# Patient Record
Sex: FEMALE | Race: WHITE | Employment: OTHER | URBAN - METROPOLITAN AREA
[De-identification: names, ages, dates, MRNs, and addresses within clinical notes are randomized per-mention and may not be internally consistent; named-entity substitution may affect disease eponyms.]

---

## 2017-01-03 RX ORDER — ALLOPURINOL 300 MG/1
TABLET ORAL
Qty: 90 TABLET | Refills: 3 | Status: SHIPPED | OUTPATIENT
Start: 2017-01-03 | End: 2018-02-07 | Stop reason: SDUPTHER

## 2017-01-16 ENCOUNTER — OFFICE VISIT (OUTPATIENT)
Dept: INTERNAL MEDICINE CLINIC | Age: 72
End: 2017-01-16

## 2017-01-16 VITALS
OXYGEN SATURATION: 90 % | TEMPERATURE: 97.3 F | WEIGHT: 160 LBS | SYSTOLIC BLOOD PRESSURE: 102 MMHG | HEIGHT: 64 IN | DIASTOLIC BLOOD PRESSURE: 58 MMHG | HEART RATE: 70 BPM | BODY MASS INDEX: 27.31 KG/M2

## 2017-01-16 DIAGNOSIS — K58.0 IRRITABLE BOWEL SYNDROME WITH DIARRHEA: Chronic | ICD-10-CM

## 2017-01-16 DIAGNOSIS — J44.1 COPD EXACERBATION (HCC): Primary | ICD-10-CM

## 2017-01-16 PROCEDURE — 99213 OFFICE O/P EST LOW 20 MIN: CPT | Performed by: INTERNAL MEDICINE

## 2017-01-16 RX ORDER — LEVOFLOXACIN 500 MG/1
500 TABLET, FILM COATED ORAL DAILY
Qty: 10 TABLET | Refills: 0 | Status: SHIPPED | OUTPATIENT
Start: 2017-01-16 | End: 2017-01-26

## 2017-01-16 RX ORDER — HYDROCODONE POLISTIREX AND CHLORPHENIRAMINE POLISTIREX 10; 8 MG/5ML; MG/5ML
5 SUSPENSION, EXTENDED RELEASE ORAL EVERY 12 HOURS PRN
Qty: 120 ML | Refills: 0 | Status: SHIPPED | OUTPATIENT
Start: 2017-01-16 | End: 2017-06-26

## 2017-01-16 ASSESSMENT — ENCOUNTER SYMPTOMS
COUGH: 1
CHOKING: 0
BACK PAIN: 0
EYES NEGATIVE: 1
SINUS PRESSURE: 0
COLOR CHANGE: 0
STRIDOR: 0
APNEA: 0
SHORTNESS OF BREATH: 1

## 2017-03-24 ENCOUNTER — OFFICE VISIT (OUTPATIENT)
Dept: INTERNAL MEDICINE CLINIC | Age: 72
End: 2017-03-24

## 2017-03-24 VITALS
TEMPERATURE: 98 F | WEIGHT: 160.13 LBS | HEART RATE: 77 BPM | SYSTOLIC BLOOD PRESSURE: 110 MMHG | DIASTOLIC BLOOD PRESSURE: 70 MMHG | HEIGHT: 64 IN | BODY MASS INDEX: 27.34 KG/M2 | OXYGEN SATURATION: 93 %

## 2017-03-24 DIAGNOSIS — J06.9 ACUTE URI: Primary | ICD-10-CM

## 2017-03-24 PROCEDURE — 99213 OFFICE O/P EST LOW 20 MIN: CPT | Performed by: INTERNAL MEDICINE

## 2017-03-24 RX ORDER — LEVOFLOXACIN 500 MG/1
500 TABLET, FILM COATED ORAL DAILY
Qty: 10 TABLET | Refills: 0 | Status: SHIPPED | OUTPATIENT
Start: 2017-03-24 | End: 2017-04-03

## 2017-03-24 RX ORDER — BENZONATATE 200 MG/1
200 CAPSULE ORAL 2 TIMES DAILY PRN
Qty: 20 CAPSULE | Refills: 1 | Status: SHIPPED | OUTPATIENT
Start: 2017-03-24 | End: 2017-04-03

## 2017-03-24 ASSESSMENT — ENCOUNTER SYMPTOMS
WHEEZING: 0
SHORTNESS OF BREATH: 0
COUGH: 1
GASTROINTESTINAL NEGATIVE: 1
SORE THROAT: 1
RHINORRHEA: 1

## 2017-03-30 ENCOUNTER — HOSPITAL ENCOUNTER (OUTPATIENT)
Dept: NON INVASIVE DIAGNOSTICS | Age: 72
Discharge: HOME | End: 2017-03-30
Attending: INTERNAL MEDICINE | Admitting: INTERNAL MEDICINE

## 2017-03-30 ENCOUNTER — OFFICE VISIT (OUTPATIENT)
Dept: INTERNAL MEDICINE CLINIC | Age: 72
End: 2017-03-30

## 2017-03-30 VITALS
HEART RATE: 72 BPM | WEIGHT: 158.8 LBS | RESPIRATION RATE: 16 BRPM | SYSTOLIC BLOOD PRESSURE: 86 MMHG | TEMPERATURE: 98.1 F | OXYGEN SATURATION: 92 % | BODY MASS INDEX: 27.26 KG/M2 | DIASTOLIC BLOOD PRESSURE: 42 MMHG

## 2017-03-30 DIAGNOSIS — J40 BRONCHITIS: Primary | ICD-10-CM

## 2017-03-30 DIAGNOSIS — I95.2 HYPOTENSION DUE TO DRUGS: ICD-10-CM

## 2017-03-30 DIAGNOSIS — J40 BRONCHITIS: ICD-10-CM

## 2017-03-30 PROCEDURE — 99213 OFFICE O/P EST LOW 20 MIN: CPT | Performed by: INTERNAL MEDICINE

## 2017-03-30 RX ORDER — PREDNISONE 10 MG/1
TABLET ORAL
Qty: 22 TABLET | Refills: 0 | Status: SHIPPED | OUTPATIENT
Start: 2017-03-30 | End: 2017-04-09

## 2017-03-30 RX ORDER — LOSARTAN POTASSIUM 50 MG/1
25 TABLET ORAL DAILY
Qty: 30 TABLET | Refills: 0
Start: 2017-03-30

## 2017-03-31 ENCOUNTER — TELEPHONE (OUTPATIENT)
Dept: INTERNAL MEDICINE CLINIC | Age: 72
End: 2017-03-31

## 2017-03-31 DIAGNOSIS — R91.8 INFILTRATE OF LUNG PRESENT ON IMAGING OF CHEST: Primary | ICD-10-CM

## 2017-04-02 ASSESSMENT — ENCOUNTER SYMPTOMS
COUGH: 1
CONSTIPATION: 0
SORE THROAT: 1
DIARRHEA: 0
SHORTNESS OF BREATH: 1
RHINORRHEA: 1
ABDOMINAL PAIN: 0
VOMITING: 0
NAUSEA: 1

## 2017-04-12 ENCOUNTER — TELEPHONE (OUTPATIENT)
Dept: INTERNAL MEDICINE CLINIC | Age: 72
End: 2017-04-12

## 2017-04-17 ENCOUNTER — HOSPITAL ENCOUNTER (OUTPATIENT)
Dept: CT IMAGING | Age: 72
Discharge: HOME | End: 2017-04-17
Attending: INTERNAL MEDICINE | Admitting: INTERNAL MEDICINE

## 2017-04-17 DIAGNOSIS — R91.8 OTHER NONSPECIFIC ABNORMAL FINDING OF LUNG FIELD: ICD-10-CM

## 2017-04-17 DIAGNOSIS — R91.8 INFILTRATE OF LUNG PRESENT ON IMAGING OF CHEST: ICD-10-CM

## 2017-06-26 ENCOUNTER — OFFICE VISIT (OUTPATIENT)
Dept: INTERNAL MEDICINE CLINIC | Age: 72
End: 2017-06-26

## 2017-06-26 VITALS
SYSTOLIC BLOOD PRESSURE: 118 MMHG | BODY MASS INDEX: 27.53 KG/M2 | HEART RATE: 76 BPM | DIASTOLIC BLOOD PRESSURE: 80 MMHG | WEIGHT: 160.38 LBS | TEMPERATURE: 97.9 F

## 2017-06-26 DIAGNOSIS — R93.89 ABNORMAL CT SCAN, CHEST: ICD-10-CM

## 2017-06-26 DIAGNOSIS — L40.9 PSORIASIS: ICD-10-CM

## 2017-06-26 DIAGNOSIS — J01.00 SUBACUTE MAXILLARY SINUSITIS: Primary | ICD-10-CM

## 2017-06-26 DIAGNOSIS — J43.8 OTHER EMPHYSEMA (HCC): ICD-10-CM

## 2017-06-26 PROCEDURE — 99214 OFFICE O/P EST MOD 30 MIN: CPT | Performed by: INTERNAL MEDICINE

## 2017-06-26 RX ORDER — LEVOFLOXACIN 500 MG/1
500 TABLET, FILM COATED ORAL DAILY
Qty: 10 TABLET | Refills: 0 | Status: SHIPPED | OUTPATIENT
Start: 2017-06-26 | End: 2017-07-06

## 2017-06-26 RX ORDER — CLOBETASOL PROPIONATE 0.5 MG/G
CREAM TOPICAL 2 TIMES DAILY
Qty: 60 G | Refills: 1 | Status: SHIPPED | OUTPATIENT
Start: 2017-06-26 | End: 2022-01-19 | Stop reason: ALTCHOICE

## 2017-06-26 ASSESSMENT — ENCOUNTER SYMPTOMS
SORE THROAT: 1
HOARSE VOICE: 1
GASTROINTESTINAL NEGATIVE: 1
ROS SKIN COMMENTS: PSORIASIS.
SINUS PRESSURE: 1
WHEEZING: 0
COUGH: 1
SHORTNESS OF BREATH: 0

## 2017-07-17 ENCOUNTER — HOSPITAL ENCOUNTER (OUTPATIENT)
Dept: CT IMAGING | Age: 72
Discharge: HOME | End: 2017-07-17
Attending: INTERNAL MEDICINE | Admitting: INTERNAL MEDICINE

## 2017-07-17 DIAGNOSIS — R93.89 ABNORMAL CT SCAN, CHEST: ICD-10-CM

## 2017-07-17 DIAGNOSIS — R93.89 ABNORMAL FINDINGS ON DIAGNOSTIC IMAGING OF OTHER SPECIFIED BODY STRUCTURES: ICD-10-CM

## 2017-09-26 ENCOUNTER — TELEPHONE (OUTPATIENT)
Dept: INTERNAL MEDICINE CLINIC | Age: 72
End: 2017-09-26

## 2018-02-08 RX ORDER — ALLOPURINOL 300 MG/1
TABLET ORAL
Qty: 90 TABLET | Refills: 3 | Status: SHIPPED | OUTPATIENT
Start: 2018-02-08 | End: 2019-02-28 | Stop reason: SDUPTHER

## 2018-02-12 ENCOUNTER — OFFICE VISIT (OUTPATIENT)
Dept: INTERNAL MEDICINE CLINIC | Age: 73
End: 2018-02-12

## 2018-02-12 VITALS
HEART RATE: 70 BPM | DIASTOLIC BLOOD PRESSURE: 66 MMHG | WEIGHT: 156 LBS | OXYGEN SATURATION: 94 % | TEMPERATURE: 97.9 F | BODY MASS INDEX: 26.78 KG/M2 | SYSTOLIC BLOOD PRESSURE: 130 MMHG

## 2018-02-12 DIAGNOSIS — J41.0 SIMPLE CHRONIC BRONCHITIS (HCC): ICD-10-CM

## 2018-02-12 DIAGNOSIS — J06.9 ACUTE URI: Primary | ICD-10-CM

## 2018-02-12 DIAGNOSIS — K58.0 IRRITABLE BOWEL SYNDROME WITH DIARRHEA: Chronic | ICD-10-CM

## 2018-02-12 DIAGNOSIS — R68.89 FLU-LIKE SYMPTOMS: ICD-10-CM

## 2018-02-12 LAB
INFLUENZA A ANTIBODY: NEGATIVE
INFLUENZA B ANTIBODY: NEGATIVE

## 2018-02-12 PROCEDURE — 1036F TOBACCO NON-USER: CPT | Performed by: INTERNAL MEDICINE

## 2018-02-12 PROCEDURE — G8427 DOCREV CUR MEDS BY ELIG CLIN: HCPCS | Performed by: INTERNAL MEDICINE

## 2018-02-12 PROCEDURE — 3017F COLORECTAL CA SCREEN DOC REV: CPT | Performed by: INTERNAL MEDICINE

## 2018-02-12 PROCEDURE — G8598 ASA/ANTIPLAT THER USED: HCPCS | Performed by: INTERNAL MEDICINE

## 2018-02-12 PROCEDURE — 3014F SCREEN MAMMO DOC REV: CPT | Performed by: INTERNAL MEDICINE

## 2018-02-12 PROCEDURE — 1123F ACP DISCUSS/DSCN MKR DOCD: CPT | Performed by: INTERNAL MEDICINE

## 2018-02-12 PROCEDURE — 4040F PNEUMOC VAC/ADMIN/RCVD: CPT | Performed by: INTERNAL MEDICINE

## 2018-02-12 PROCEDURE — G8484 FLU IMMUNIZE NO ADMIN: HCPCS | Performed by: INTERNAL MEDICINE

## 2018-02-12 PROCEDURE — G8926 SPIRO NO PERF OR DOC: HCPCS | Performed by: INTERNAL MEDICINE

## 2018-02-12 PROCEDURE — 99214 OFFICE O/P EST MOD 30 MIN: CPT | Performed by: INTERNAL MEDICINE

## 2018-02-12 PROCEDURE — 1090F PRES/ABSN URINE INCON ASSESS: CPT | Performed by: INTERNAL MEDICINE

## 2018-02-12 PROCEDURE — 87804 INFLUENZA ASSAY W/OPTIC: CPT | Performed by: INTERNAL MEDICINE

## 2018-02-12 PROCEDURE — 3023F SPIROM DOC REV: CPT | Performed by: INTERNAL MEDICINE

## 2018-02-12 PROCEDURE — G8399 PT W/DXA RESULTS DOCUMENT: HCPCS | Performed by: INTERNAL MEDICINE

## 2018-02-12 PROCEDURE — G8419 CALC BMI OUT NRM PARAM NOF/U: HCPCS | Performed by: INTERNAL MEDICINE

## 2018-02-12 RX ORDER — AZITHROMYCIN 250 MG/1
TABLET, FILM COATED ORAL
Qty: 6 TABLET | Refills: 0 | Status: SHIPPED | OUTPATIENT
Start: 2018-02-12 | End: 2018-02-20

## 2018-02-12 ASSESSMENT — PATIENT HEALTH QUESTIONNAIRE - PHQ9
SUM OF ALL RESPONSES TO PHQ QUESTIONS 1-9: 0
SUM OF ALL RESPONSES TO PHQ9 QUESTIONS 1 & 2: 0
2. FEELING DOWN, DEPRESSED OR HOPELESS: 0
1. LITTLE INTEREST OR PLEASURE IN DOING THINGS: 0

## 2018-02-12 ASSESSMENT — ENCOUNTER SYMPTOMS
SHORTNESS OF BREATH: 0
GASTROINTESTINAL NEGATIVE: 1
WHEEZING: 0
COUGH: 1
SORE THROAT: 1

## 2018-02-12 NOTE — PATIENT INSTRUCTIONS
Please call your pharmacy if you need any refills of your medication(s). Please call our office at [phone] if you don't hear from us about your test results. Bring an accurate list of your medications with you at every appointment to ensure that we have the correct information.     Our office hours are: Monday - Friday 8:30 am- 5 pm    Phone lines turn on at 8:30 am

## 2018-02-20 ENCOUNTER — OFFICE VISIT (OUTPATIENT)
Dept: INTERNAL MEDICINE CLINIC | Age: 73
End: 2018-02-20

## 2018-02-20 VITALS
WEIGHT: 154.25 LBS | SYSTOLIC BLOOD PRESSURE: 120 MMHG | BODY MASS INDEX: 26.48 KG/M2 | DIASTOLIC BLOOD PRESSURE: 60 MMHG | OXYGEN SATURATION: 91 % | HEART RATE: 83 BPM | TEMPERATURE: 98.5 F

## 2018-02-20 DIAGNOSIS — E78.00 PURE HYPERCHOLESTEROLEMIA: Chronic | ICD-10-CM

## 2018-02-20 DIAGNOSIS — J20.9 COPD (CHRONIC OBSTRUCTIVE PULMONARY DISEASE) WITH ACUTE BRONCHITIS (HCC): Primary | ICD-10-CM

## 2018-02-20 DIAGNOSIS — J44.0 COPD (CHRONIC OBSTRUCTIVE PULMONARY DISEASE) WITH ACUTE BRONCHITIS (HCC): Primary | ICD-10-CM

## 2018-02-20 DIAGNOSIS — R68.89 FLU-LIKE SYMPTOMS: ICD-10-CM

## 2018-02-20 DIAGNOSIS — K58.0 IRRITABLE BOWEL SYNDROME WITH DIARRHEA: ICD-10-CM

## 2018-02-20 LAB
INFLUENZA A ANTIBODY: NORMAL
INFLUENZA B ANTIBODY: NORMAL

## 2018-02-20 PROCEDURE — 3014F SCREEN MAMMO DOC REV: CPT | Performed by: INTERNAL MEDICINE

## 2018-02-20 PROCEDURE — 3023F SPIROM DOC REV: CPT | Performed by: INTERNAL MEDICINE

## 2018-02-20 PROCEDURE — G8926 SPIRO NO PERF OR DOC: HCPCS | Performed by: INTERNAL MEDICINE

## 2018-02-20 PROCEDURE — 87804 INFLUENZA ASSAY W/OPTIC: CPT | Performed by: INTERNAL MEDICINE

## 2018-02-20 PROCEDURE — 4040F PNEUMOC VAC/ADMIN/RCVD: CPT | Performed by: INTERNAL MEDICINE

## 2018-02-20 PROCEDURE — G8598 ASA/ANTIPLAT THER USED: HCPCS | Performed by: INTERNAL MEDICINE

## 2018-02-20 PROCEDURE — G8419 CALC BMI OUT NRM PARAM NOF/U: HCPCS | Performed by: INTERNAL MEDICINE

## 2018-02-20 PROCEDURE — G8484 FLU IMMUNIZE NO ADMIN: HCPCS | Performed by: INTERNAL MEDICINE

## 2018-02-20 PROCEDURE — 1123F ACP DISCUSS/DSCN MKR DOCD: CPT | Performed by: INTERNAL MEDICINE

## 2018-02-20 PROCEDURE — 99213 OFFICE O/P EST LOW 20 MIN: CPT | Performed by: INTERNAL MEDICINE

## 2018-02-20 PROCEDURE — G8399 PT W/DXA RESULTS DOCUMENT: HCPCS | Performed by: INTERNAL MEDICINE

## 2018-02-20 PROCEDURE — 3017F COLORECTAL CA SCREEN DOC REV: CPT | Performed by: INTERNAL MEDICINE

## 2018-02-20 PROCEDURE — 1036F TOBACCO NON-USER: CPT | Performed by: INTERNAL MEDICINE

## 2018-02-20 PROCEDURE — G8427 DOCREV CUR MEDS BY ELIG CLIN: HCPCS | Performed by: INTERNAL MEDICINE

## 2018-02-20 PROCEDURE — 1090F PRES/ABSN URINE INCON ASSESS: CPT | Performed by: INTERNAL MEDICINE

## 2018-02-20 RX ORDER — METHYLPREDNISOLONE 4 MG/1
TABLET ORAL
Qty: 1 KIT | Refills: 0 | Status: SHIPPED | OUTPATIENT
Start: 2018-02-20 | End: 2019-04-05 | Stop reason: SDUPTHER

## 2018-02-20 RX ORDER — LEVOFLOXACIN 500 MG/1
500 TABLET, FILM COATED ORAL DAILY
Qty: 10 TABLET | Refills: 0 | Status: SHIPPED | OUTPATIENT
Start: 2018-02-20 | End: 2019-04-05 | Stop reason: SDUPTHER

## 2018-02-20 ASSESSMENT — ENCOUNTER SYMPTOMS
COUGH: 1
WHEEZING: 1
NAUSEA: 1
DIARRHEA: 0
SHORTNESS OF BREATH: 1

## 2018-03-13 DIAGNOSIS — J45.901 ACUTE BRONCHITIS WITH ASTHMA WITH ACUTE EXACERBATION: ICD-10-CM

## 2018-03-13 DIAGNOSIS — J20.9 ACUTE BRONCHITIS WITH ASTHMA WITH ACUTE EXACERBATION: ICD-10-CM

## 2018-08-06 ENCOUNTER — OFFICE VISIT (OUTPATIENT)
Dept: INTERNAL MEDICINE CLINIC | Age: 73
End: 2018-08-06

## 2018-08-06 VITALS
SYSTOLIC BLOOD PRESSURE: 116 MMHG | DIASTOLIC BLOOD PRESSURE: 62 MMHG | WEIGHT: 157 LBS | HEART RATE: 69 BPM | OXYGEN SATURATION: 94 % | TEMPERATURE: 97.9 F | BODY MASS INDEX: 26.95 KG/M2

## 2018-08-06 DIAGNOSIS — J06.9 ACUTE URI: Primary | ICD-10-CM

## 2018-08-06 PROCEDURE — G8427 DOCREV CUR MEDS BY ELIG CLIN: HCPCS | Performed by: INTERNAL MEDICINE

## 2018-08-06 PROCEDURE — G8598 ASA/ANTIPLAT THER USED: HCPCS | Performed by: INTERNAL MEDICINE

## 2018-08-06 PROCEDURE — 3017F COLORECTAL CA SCREEN DOC REV: CPT | Performed by: INTERNAL MEDICINE

## 2018-08-06 PROCEDURE — G8399 PT W/DXA RESULTS DOCUMENT: HCPCS | Performed by: INTERNAL MEDICINE

## 2018-08-06 PROCEDURE — 1090F PRES/ABSN URINE INCON ASSESS: CPT | Performed by: INTERNAL MEDICINE

## 2018-08-06 PROCEDURE — 1101F PT FALLS ASSESS-DOCD LE1/YR: CPT | Performed by: INTERNAL MEDICINE

## 2018-08-06 PROCEDURE — 99213 OFFICE O/P EST LOW 20 MIN: CPT | Performed by: INTERNAL MEDICINE

## 2018-08-06 PROCEDURE — G8419 CALC BMI OUT NRM PARAM NOF/U: HCPCS | Performed by: INTERNAL MEDICINE

## 2018-08-06 PROCEDURE — 1036F TOBACCO NON-USER: CPT | Performed by: INTERNAL MEDICINE

## 2018-08-06 PROCEDURE — 4040F PNEUMOC VAC/ADMIN/RCVD: CPT | Performed by: INTERNAL MEDICINE

## 2018-08-06 PROCEDURE — 1123F ACP DISCUSS/DSCN MKR DOCD: CPT | Performed by: INTERNAL MEDICINE

## 2018-08-06 RX ORDER — HYDROCODONE POLISTIREX AND CHLORPHENIRAMINE POLISTIREX 10; 8 MG/5ML; MG/5ML
5 SUSPENSION, EXTENDED RELEASE ORAL EVERY 12 HOURS PRN
Qty: 120 ML | Refills: 0 | Status: SHIPPED | OUTPATIENT
Start: 2018-08-06 | End: 2018-08-16

## 2018-08-06 RX ORDER — AZITHROMYCIN 250 MG/1
TABLET, FILM COATED ORAL
Qty: 6 TABLET | Refills: 0 | Status: SHIPPED | OUTPATIENT
Start: 2018-08-06 | End: 2018-08-16

## 2018-08-06 ASSESSMENT — ENCOUNTER SYMPTOMS
SHORTNESS OF BREATH: 1
COUGH: 1
SORE THROAT: 1
VOMITING: 1

## 2018-08-06 NOTE — PROGRESS NOTES
Subjective:      Patient ID: Albina Lutz is a 67 y.o. female. Patient presents with:  Cough: sore throat, cough(yellow), sinus pain and pressure, blowing out bloody mucous. Albina Lutz is a 67 y.o. female with the following history as recorded in [facility]:  Patient Active Problem List    Bunion, right foot         Date Noted: 12/12/2013      Hammertoe         Date Noted: 12/12/2013      HTN (hypertension)         Date Noted: 12/12/2013      Hyperlipidemia         Date Noted: 12/12/2013      IBS (irritable bowel syndrome)         Date Noted: 06/07/2010      CAD (coronary artery disease)         Date Noted: 06/07/2010      Gout, arthritis         Date Noted: 06/07/2010      IBS (irritable bowel syndrome)         Date Noted: 06/07/2010      CAD (coronary artery disease)         Date Noted: 06/07/2010      Current Outpatient Prescriptions:  VENTOLIN  (90 Base) MCG/ACT inhaler, INHALE 2 PUFFS INTO THE LUNGS EVERY 6 HOURS AS NEEDED FOR WHEEZING, Disp: 18 Inhaler, Rfl: 1  allopurinol (ZYLOPRIM) 300 MG tablet, TAKE 1 TABLET EVERY DAY, Disp: 90 tablet, Rfl: 3  clobetasol (TEMOVATE) 0.05 % cream, Apply topically 2 times daily, Disp: 60 g, Rfl: 1  losartan (COZAAR) 50 MG tablet, Take 0.5 tablets by mouth daily, Disp: 30 tablet, Rfl: 0  prochlorperazine (COMPAZINE) 10 MG tablet, Take 1 tablet by mouth every 6 hours as needed, Disp: 50 tablet, Rfl: 3  medroxyPROGESTERone (PROVERA) 2.5 MG tablet, , Disp: , Rfl:   estradiol (ESTRACE) 0.5 MG tablet, Take 0.5 mg by mouth daily. , Disp: , Rfl:   atorvastatin (LIPITOR) 40 MG tablet, , Disp: , Rfl:   fluticasone (FLONASE) 50 MCG/ACT nasal spray, 2 sprays by Nasal route daily. ., Disp: 1 Bottle, Rfl: 3  Loratadine (CLARITIN PO), Take 10 mg by mouth daily. , Disp: , Rfl:   atenolol (TENORMIN) 25 MG tablet, Take 25 mg by mouth daily. , Disp: , Rfl:   aspirin 81 MG EC tablet, Take 81 mg by mouth daily. , Disp: , Rfl:     No current facility-administered medications for this visit. Constitutional: She is oriented to person, place, and time. She appears well-developed and well-nourished. HENT:   Head: Normocephalic and atraumatic. Right Ear: External ear normal.   Left Ear: External ear normal.   Mouth/Throat: No oropharyngeal exudate. Eyes: Conjunctivae and EOM are normal. Pupils are equal, round, and reactive to light. No scleral icterus. Neck: Normal range of motion. Neck supple. No thyromegaly present. Cardiovascular: Normal rate, regular rhythm and normal heart sounds. No murmur heard. Pulmonary/Chest: Effort normal. She has decreased breath sounds. She has no wheezes. She exhibits no tenderness. Abdominal: Soft. She exhibits no distension and no mass. There is no tenderness. Musculoskeletal: Normal range of motion. She exhibits no edema or tenderness. Lymphadenopathy:     She has no cervical adenopathy. Neurological: She is alert and oriented to person, place, and time. She has normal reflexes. Skin: Skin is warm. No erythema. Psychiatric: She has a normal mood and affect. Thought content normal.       Assessment:      Encounter Diagnosis   Name Primary?  Acute URI Yes           Plan:      William Porter was seen today for cough. Diagnoses and all orders for this visit:    Acute URI  -     azithromycin (ZITHROMAX Z-RODOLFO) 250 MG tablet; As directed. -     hydrocodone-chlorpheniramine ([facility]) 10-8 MG/5ML SUER; Take 5 mLs by mouth every 12 hours as needed (cough) for up to 10 days. Marino Ngo

## 2018-08-06 NOTE — PATIENT INSTRUCTIONS
Please call your pharmacy if you need any refills of your medication(s). Please call our office at [phone] if you don't hear from us about your test results. Bring an accurate list of your medications with you at every appointment to ensure that we have the correct information.     Our office hours are: Monday - Friday 8:30 am- 5 pm    Phone lines turn on at 8:30 am

## 2018-11-26 ENCOUNTER — OFFICE VISIT (OUTPATIENT)
Dept: INTERNAL MEDICINE CLINIC | Age: 73
End: 2018-11-26
Payer: MEDICARE

## 2018-11-26 VITALS
TEMPERATURE: 98.7 F | DIASTOLIC BLOOD PRESSURE: 62 MMHG | WEIGHT: 160 LBS | BODY MASS INDEX: 27.46 KG/M2 | HEART RATE: 108 BPM | SYSTOLIC BLOOD PRESSURE: 118 MMHG | OXYGEN SATURATION: 91 %

## 2018-11-26 DIAGNOSIS — J06.9 ACUTE URI: Primary | ICD-10-CM

## 2018-11-26 DIAGNOSIS — M67.40 GANGLION CYST: ICD-10-CM

## 2018-11-26 PROCEDURE — 1101F PT FALLS ASSESS-DOCD LE1/YR: CPT | Performed by: INTERNAL MEDICINE

## 2018-11-26 PROCEDURE — G8399 PT W/DXA RESULTS DOCUMENT: HCPCS | Performed by: INTERNAL MEDICINE

## 2018-11-26 PROCEDURE — 3017F COLORECTAL CA SCREEN DOC REV: CPT | Performed by: INTERNAL MEDICINE

## 2018-11-26 PROCEDURE — 99214 OFFICE O/P EST MOD 30 MIN: CPT | Performed by: INTERNAL MEDICINE

## 2018-11-26 PROCEDURE — G8419 CALC BMI OUT NRM PARAM NOF/U: HCPCS | Performed by: INTERNAL MEDICINE

## 2018-11-26 PROCEDURE — G8484 FLU IMMUNIZE NO ADMIN: HCPCS | Performed by: INTERNAL MEDICINE

## 2018-11-26 PROCEDURE — 1090F PRES/ABSN URINE INCON ASSESS: CPT | Performed by: INTERNAL MEDICINE

## 2018-11-26 PROCEDURE — 1036F TOBACCO NON-USER: CPT | Performed by: INTERNAL MEDICINE

## 2018-11-26 PROCEDURE — G8598 ASA/ANTIPLAT THER USED: HCPCS | Performed by: INTERNAL MEDICINE

## 2018-11-26 PROCEDURE — 4040F PNEUMOC VAC/ADMIN/RCVD: CPT | Performed by: INTERNAL MEDICINE

## 2018-11-26 PROCEDURE — 1123F ACP DISCUSS/DSCN MKR DOCD: CPT | Performed by: INTERNAL MEDICINE

## 2018-11-26 PROCEDURE — G8427 DOCREV CUR MEDS BY ELIG CLIN: HCPCS | Performed by: INTERNAL MEDICINE

## 2018-11-26 RX ORDER — AZITHROMYCIN 250 MG/1
TABLET, FILM COATED ORAL
Qty: 6 TABLET | Refills: 0 | Status: SHIPPED | OUTPATIENT
Start: 2018-11-26 | End: 2018-12-06

## 2018-11-26 ASSESSMENT — ENCOUNTER SYMPTOMS: COUGH: 1

## 2018-11-26 NOTE — PROGRESS NOTES
Subjective:      Patient ID: Beto Mahoney is a 75 y.o. female. Patient presents with:  Fever: fever, headache  Foot Problem: blister on left big toe, about 3 days. Beto Mahoney is a 75 y.o. female with the following history as recorded in [facility]:  Patient Active Problem List    Bunion, right foot         Date Noted: 12/12/2013      Hammertoe         Date Noted: 12/12/2013      HTN (hypertension)         Date Noted: 12/12/2013      Hyperlipidemia         Date Noted: 12/12/2013      IBS (irritable bowel syndrome)         Date Noted: 06/07/2010      CAD (coronary artery disease)         Date Noted: 06/07/2010      Gout, arthritis         Date Noted: 06/07/2010      IBS (irritable bowel syndrome)         Date Noted: 06/07/2010      CAD (coronary artery disease)         Date Noted: 06/07/2010      Current Outpatient Prescriptions:  azithromycin (ZITHROMAX Z-RODOLFO) 250 MG tablet, As directed., Disp: 6 tablet, Rfl: 0  VENTOLIN  (90 Base) MCG/ACT inhaler, INHALE 2 PUFFS INTO THE LUNGS EVERY 6 HOURS AS NEEDED FOR WHEEZING, Disp: 18 Inhaler, Rfl: 1  allopurinol (ZYLOPRIM) 300 MG tablet, TAKE 1 TABLET EVERY DAY, Disp: 90 tablet, Rfl: 3  clobetasol (TEMOVATE) 0.05 % cream, Apply topically 2 times daily, Disp: 60 g, Rfl: 1  losartan (COZAAR) 50 MG tablet, Take 0.5 tablets by mouth daily, Disp: 30 tablet, Rfl: 0  prochlorperazine (COMPAZINE) 10 MG tablet, Take 1 tablet by mouth every 6 hours as needed, Disp: 50 tablet, Rfl: 3  medroxyPROGESTERone (PROVERA) 2.5 MG tablet, , Disp: , Rfl:   estradiol (ESTRACE) 0.5 MG tablet, Take 0.5 mg by mouth daily. , Disp: , Rfl:   atorvastatin (LIPITOR) 40 MG tablet, , Disp: , Rfl:   fluticasone (FLONASE) 50 MCG/ACT nasal spray, 2 sprays by Nasal route daily. ., Disp: 1 Bottle, Rfl: 3  Loratadine (CLARITIN PO), Take 10 mg by mouth daily. , Disp: , Rfl:   atenolol (TENORMIN) 25 MG tablet, Take 25 mg by mouth daily. , Disp: , Rfl:   aspirin 81 MG EC tablet, Take 81 mg by mouth daily. , Disp: provided no relief. Review of Systems   Constitutional: Positive for fever. HENT: Positive for congestion. Respiratory: Positive for cough. Musculoskeletal: Positive for arthralgias. Lt great toe DIP joint area blister like ganglion cyst. No sign of infection and any injury lately. Not much painful. Objective:   Physical Exam   Pulmonary/Chest: She has decreased breath sounds. Skin:            Assessment:      Encounter Diagnoses   Name Primary?  Acute URI Yes    Ganglion cyst            Plan:      Yu Adams was seen today for fever and foot problem. Diagnoses and all orders for this visit:    Acute URI  -     azithromycin (ZITHROMAX Z-RODOLFO) 250 MG tablet; As directed.     Ganglion cyst      Clean wound and poke cyst to remove 1ml amount of serosal fluid   Ngoc Rivera MD

## 2019-02-11 ENCOUNTER — OFFICE VISIT (OUTPATIENT)
Dept: INTERNAL MEDICINE CLINIC | Age: 74
End: 2019-02-11
Payer: MEDICARE

## 2019-02-11 VITALS
BODY MASS INDEX: 26.95 KG/M2 | SYSTOLIC BLOOD PRESSURE: 102 MMHG | OXYGEN SATURATION: 94 % | DIASTOLIC BLOOD PRESSURE: 54 MMHG | WEIGHT: 157 LBS | TEMPERATURE: 98.1 F | HEART RATE: 61 BPM

## 2019-02-11 DIAGNOSIS — K52.9 COLITIS: Primary | ICD-10-CM

## 2019-02-11 DIAGNOSIS — I10 ESSENTIAL HYPERTENSION: Chronic | ICD-10-CM

## 2019-02-11 DIAGNOSIS — A09 DIARRHEA OF INFECTIOUS ORIGIN: ICD-10-CM

## 2019-02-11 DIAGNOSIS — R10.30 LOWER ABDOMINAL PAIN: ICD-10-CM

## 2019-02-11 DIAGNOSIS — K58.0 IRRITABLE BOWEL SYNDROME WITH DIARRHEA: ICD-10-CM

## 2019-02-11 PROCEDURE — G8419 CALC BMI OUT NRM PARAM NOF/U: HCPCS | Performed by: INTERNAL MEDICINE

## 2019-02-11 PROCEDURE — 1123F ACP DISCUSS/DSCN MKR DOCD: CPT | Performed by: INTERNAL MEDICINE

## 2019-02-11 PROCEDURE — G8598 ASA/ANTIPLAT THER USED: HCPCS | Performed by: INTERNAL MEDICINE

## 2019-02-11 PROCEDURE — 1036F TOBACCO NON-USER: CPT | Performed by: INTERNAL MEDICINE

## 2019-02-11 PROCEDURE — G8427 DOCREV CUR MEDS BY ELIG CLIN: HCPCS | Performed by: INTERNAL MEDICINE

## 2019-02-11 PROCEDURE — 99214 OFFICE O/P EST MOD 30 MIN: CPT | Performed by: INTERNAL MEDICINE

## 2019-02-11 PROCEDURE — 4040F PNEUMOC VAC/ADMIN/RCVD: CPT | Performed by: INTERNAL MEDICINE

## 2019-02-11 PROCEDURE — G8399 PT W/DXA RESULTS DOCUMENT: HCPCS | Performed by: INTERNAL MEDICINE

## 2019-02-11 PROCEDURE — 3017F COLORECTAL CA SCREEN DOC REV: CPT | Performed by: INTERNAL MEDICINE

## 2019-02-11 PROCEDURE — 1101F PT FALLS ASSESS-DOCD LE1/YR: CPT | Performed by: INTERNAL MEDICINE

## 2019-02-11 PROCEDURE — G8484 FLU IMMUNIZE NO ADMIN: HCPCS | Performed by: INTERNAL MEDICINE

## 2019-02-11 PROCEDURE — 1090F PRES/ABSN URINE INCON ASSESS: CPT | Performed by: INTERNAL MEDICINE

## 2019-02-11 RX ORDER — METHYLPREDNISOLONE 4 MG/1
TABLET ORAL
Qty: 1 KIT | Refills: 0 | Status: SHIPPED | OUTPATIENT
Start: 2019-02-11 | End: 2019-02-17

## 2019-02-11 RX ORDER — DIPHENOXYLATE HYDROCHLORIDE AND ATROPINE SULFATE 2.5; .025 MG/1; MG/1
2 TABLET ORAL 4 TIMES DAILY
Qty: 30 TABLET | Refills: 1 | Status: SHIPPED | OUTPATIENT
Start: 2019-02-11 | End: 2019-03-11

## 2019-02-11 ASSESSMENT — PATIENT HEALTH QUESTIONNAIRE - PHQ9
1. LITTLE INTEREST OR PLEASURE IN DOING THINGS: 0
SUM OF ALL RESPONSES TO PHQ QUESTIONS 1-9: 0
SUM OF ALL RESPONSES TO PHQ9 QUESTIONS 1 & 2: 0
2. FEELING DOWN, DEPRESSED OR HOPELESS: 0
SUM OF ALL RESPONSES TO PHQ QUESTIONS 1-9: 0

## 2019-02-11 ASSESSMENT — ENCOUNTER SYMPTOMS
DIARRHEA: 1
RESPIRATORY NEGATIVE: 1
ABDOMINAL PAIN: 1
EYES NEGATIVE: 1

## 2019-02-12 DIAGNOSIS — K52.9 COLITIS: ICD-10-CM

## 2019-02-12 DIAGNOSIS — R10.30 LOWER ABDOMINAL PAIN: ICD-10-CM

## 2019-02-12 LAB — C DIFFICILE TOXIN, EIA: NORMAL

## 2019-02-26 ENCOUNTER — TELEPHONE (OUTPATIENT)
Dept: INTERNAL MEDICINE CLINIC | Age: 74
End: 2019-02-26

## 2019-02-28 RX ORDER — ALLOPURINOL 300 MG/1
TABLET ORAL
Qty: 90 TABLET | Refills: 3 | Status: SHIPPED | OUTPATIENT
Start: 2019-02-28 | End: 2020-03-12

## 2019-03-15 RX ORDER — TRIAMTERENE AND HYDROCHLOROTHIAZIDE 37.5; 25 MG/1; MG/1
1 TABLET ORAL DAILY
Status: ON HOLD | COMMUNITY
End: 2019-07-05 | Stop reason: HOSPADM

## 2019-03-15 NOTE — PROGRESS NOTES
C-Difficile admission screening and protocol:     * Admitted with diarrhea? *Prior history of C-Diff. In last 3 months? *Antibiotic use in the past 6-8 weeks? *Prior hospitalization or nursing home in the last month? [address]  time_____9_______        Surgery time____________    Take the following medications with a sip of water:losartan, triametere, claritn    Do not eat or drink anything after 12:00 midnight prior to your surgery. This includes water chewing gum, mints and ice chips. You may brush your teeth and gargle the morning of your surgery, but do not swallow the water     Please see your family doctor/pediatrician for a history and physical and/or concerning medications. Bring any test results/reports from your physicians office. If you are under the care of a heart doctor or specialist doctor, please be aware that you may be asked to them for clearance    You may be asked to stop blood thinners such as Coumadin, Plavix, Fragmin, Lovenox, etc., or any anti-inflammatories such as:  Aspirin, Ibuprofen, Advil, Naproxen prior to your surgery. We also ask that you stop any OTC medications such as fish oil, vitamin E, glucosamine, garlic, Multivitamins, COQ 10, etc.    We ask that you do not smoke 24 hours prior to surgery  We ask that you do not  drink any alcoholic beverages 24 hours prior to surgery     You must make arrangements for a responsible adult to take you home after your surgery. For your safety you will not be allowed to leave alone or drive yourself home. Your surgery will be cancelled if you do not have a ride home. Also for your safety, it is strongly suggested that someone stay with you the first 24 hours after your surgery. A parent or legal guardian must accompany a child scheduled for surgery and plan to stay at the hospital until the child is discharged.     Please do not bring other children with

## 2019-03-19 ENCOUNTER — ANESTHESIA EVENT (OUTPATIENT)
Dept: ENDOSCOPY | Age: 74
End: 2019-03-19
Payer: MEDICARE

## 2019-03-20 ENCOUNTER — ANESTHESIA (OUTPATIENT)
Dept: ENDOSCOPY | Age: 74
End: 2019-03-20
Payer: MEDICARE

## 2019-03-20 ENCOUNTER — HOSPITAL ENCOUNTER (OUTPATIENT)
Age: 74
Setting detail: OUTPATIENT SURGERY
Discharge: HOME | End: 2019-03-20
Attending: INTERNAL MEDICINE | Admitting: INTERNAL MEDICINE
Payer: MEDICARE

## 2019-03-20 VITALS
SYSTOLIC BLOOD PRESSURE: 131 MMHG | OXYGEN SATURATION: 99 % | RESPIRATION RATE: 19 BRPM | DIASTOLIC BLOOD PRESSURE: 61 MMHG

## 2019-03-20 VITALS
HEIGHT: 63 IN | SYSTOLIC BLOOD PRESSURE: 155 MMHG | BODY MASS INDEX: 27.17 KG/M2 | DIASTOLIC BLOOD PRESSURE: 85 MMHG | RESPIRATION RATE: 16 BRPM | TEMPERATURE: 96.9 F | WEIGHT: 153.33 LBS | OXYGEN SATURATION: 96 % | HEART RATE: 60 BPM

## 2019-03-20 DIAGNOSIS — Z80.0 FAMILY HISTORY OF COLON CANCER: ICD-10-CM

## 2019-03-20 LAB
ANION GAP SERPL CALCULATED.3IONS-SCNC: 11 MMOL/L (ref 3–16)
BUN BLDV-MCNC: 22 MG/DL (ref 7–20)
CALCIUM SERPL-MCNC: 8.8 MG/DL (ref 8.3–10.6)
CHLORIDE BLD-SCNC: 106 MMOL/L (ref 99–110)
CO2: 24 MMOL/L (ref 21–32)
CREAT SERPL-MCNC: 0.9 MG/DL (ref 0.6–1.2)
GFR AFRICAN AMERICAN: >60
GFR NON-AFRICAN AMERICAN: >60
GLUCOSE BLD-MCNC: 68 MG/DL (ref 70–99)
POTASSIUM REFLEX MAGNESIUM: 3.9 MMOL/L (ref 3.5–5.1)
SODIUM BLD-SCNC: 141 MMOL/L (ref 136–145)

## 2019-03-20 PROCEDURE — 3700000000 HC ANESTHESIA ATTENDED CARE: Performed by: INTERNAL MEDICINE

## 2019-03-20 PROCEDURE — 2580000003 HC RX 258: Performed by: ANESTHESIOLOGY

## 2019-03-20 PROCEDURE — 88305 TISSUE EXAM BY PATHOLOGIST: CPT

## 2019-03-20 PROCEDURE — 80048 BASIC METABOLIC PNL TOTAL CA: CPT

## 2019-03-20 PROCEDURE — 7100000001 HC PACU RECOVERY - ADDTL 15 MIN: Performed by: INTERNAL MEDICINE

## 2019-03-20 PROCEDURE — 36415 COLL VENOUS BLD VENIPUNCTURE: CPT

## 2019-03-20 PROCEDURE — 93005 ELECTROCARDIOGRAM TRACING: CPT | Performed by: ANESTHESIOLOGY

## 2019-03-20 PROCEDURE — 2500000003 HC RX 250 WO HCPCS: Performed by: NURSE ANESTHETIST, CERTIFIED REGISTERED

## 2019-03-20 PROCEDURE — 7100000011 HC PHASE II RECOVERY - ADDTL 15 MIN: Performed by: INTERNAL MEDICINE

## 2019-03-20 PROCEDURE — 3609010300 HC COLONOSCOPY W/BIOPSY SINGLE/MULTIPLE: Performed by: INTERNAL MEDICINE

## 2019-03-20 PROCEDURE — 3700000001 HC ADD 15 MINUTES (ANESTHESIA): Performed by: INTERNAL MEDICINE

## 2019-03-20 PROCEDURE — 7100000010 HC PHASE II RECOVERY - FIRST 15 MIN: Performed by: INTERNAL MEDICINE

## 2019-03-20 PROCEDURE — 7100000000 HC PACU RECOVERY - FIRST 15 MIN: Performed by: INTERNAL MEDICINE

## 2019-03-20 PROCEDURE — 2709999900 HC NON-CHARGEABLE SUPPLY: Performed by: INTERNAL MEDICINE

## 2019-03-20 PROCEDURE — 6360000002 HC RX W HCPCS: Performed by: NURSE ANESTHETIST, CERTIFIED REGISTERED

## 2019-03-20 RX ORDER — ONDANSETRON 2 MG/ML
4 INJECTION INTRAMUSCULAR; INTRAVENOUS
Status: DISCONTINUED | OUTPATIENT
Start: 2019-03-20 | End: 2019-03-20 | Stop reason: HOSPADM

## 2019-03-20 RX ORDER — SODIUM CHLORIDE 0.9 % (FLUSH) 0.9 %
10 SYRINGE (ML) INJECTION PRN
Status: DISCONTINUED | OUTPATIENT
Start: 2019-03-20 | End: 2019-03-20 | Stop reason: HOSPADM

## 2019-03-20 RX ORDER — SODIUM CHLORIDE 0.9 % (FLUSH) 0.9 %
10 SYRINGE (ML) INJECTION EVERY 12 HOURS SCHEDULED
Status: DISCONTINUED | OUTPATIENT
Start: 2019-03-20 | End: 2019-03-20 | Stop reason: HOSPADM

## 2019-03-20 RX ORDER — PROPOFOL 10 MG/ML
INJECTION, EMULSION INTRAVENOUS CONTINUOUS PRN
Status: DISCONTINUED | OUTPATIENT
Start: 2019-03-20 | End: 2019-03-20 | Stop reason: SDUPTHER

## 2019-03-20 RX ORDER — LIDOCAINE HYDROCHLORIDE 20 MG/ML
INJECTION, SOLUTION EPIDURAL; INFILTRATION; INTRACAUDAL; PERINEURAL PRN
Status: DISCONTINUED | OUTPATIENT
Start: 2019-03-20 | End: 2019-03-20 | Stop reason: SDUPTHER

## 2019-03-20 RX ORDER — SODIUM CHLORIDE 9 MG/ML
INJECTION, SOLUTION INTRAVENOUS CONTINUOUS
Status: DISCONTINUED | OUTPATIENT
Start: 2019-03-20 | End: 2019-03-20 | Stop reason: HOSPADM

## 2019-03-20 RX ADMIN — PROPOFOL 140 MCG/KG/MIN: 10 INJECTION, EMULSION INTRAVENOUS at 10:33

## 2019-03-20 RX ADMIN — LIDOCAINE HYDROCHLORIDE 40 MG: 20 INJECTION, SOLUTION EPIDURAL; INFILTRATION; INTRACAUDAL; PERINEURAL at 10:32

## 2019-03-20 RX ADMIN — SODIUM CHLORIDE: 9 INJECTION, SOLUTION INTRAVENOUS at 09:52

## 2019-03-20 ASSESSMENT — PULMONARY FUNCTION TESTS
PIF_VALUE: 0
PIF_VALUE: 1
PIF_VALUE: 0
PIF_VALUE: 1

## 2019-03-20 ASSESSMENT — PAIN SCALES - GENERAL
PAINLEVEL_OUTOF10: 0

## 2019-03-20 ASSESSMENT — PAIN - FUNCTIONAL ASSESSMENT: PAIN_FUNCTIONAL_ASSESSMENT: 0-10

## 2019-03-20 ASSESSMENT — ENCOUNTER SYMPTOMS: SHORTNESS OF BREATH: 0

## 2019-03-20 NOTE — H&P
[facility]   Pre-operative History and Physical    Patient: Raman Britton  : 1945  Acct#:         HISTORY OF PRESENT ILLNESS:    The patient is a 75 y.o. female  who presents with diarrhea, family history of colon cancer  Past Medical History:        Diagnosis Date    Allergic rhinitis     Asthma     Bunion, right foot 2013    CAD (coronary artery disease) 2010    CAD (coronary artery disease)     Gout     Hammertoe 2013    Hyperlipidemia     Hypertension     IBS (irritable bowel syndrome) 2010    Pneumonia     Psoriasis      Past Surgical History:        Procedure Laterality Date    BUNIONECTOMY Right 2013    COLONOSCOPY      neg.  CORONARY ANGIOPLASTY WITH STENT PLACEMENT          EYE SURGERY  2010    left    HAMMER TOE SURGERY Right 2013     Medications prior to admission:   Prior to Admission medications    Medication Sig Start Date End Date Taking? Authorizing Provider   aspirin 325 MG tablet Take 325 mg by mouth daily   Yes Historical Provider, MD   triamterene-hydrochlorothiazide (MAXZIDE-25) 37.5-25 MG per tablet Take 1 tablet by mouth daily   Yes Historical Provider, MD   allopurinol (ZYLOPRIM) 300 MG tablet TAKE 1 TABLET EVERY DAY 19  Yes Nelia Velez MD   clobetasol (TEMOVATE) 0.05 % cream Apply topically 2 times daily 17  Yes Nelia Velez MD   losartan (COZAAR) 50 MG tablet Take 0.5 tablets by mouth daily 3/30/17  Yes Luis Manuel Eaton MD   prochlorperazine (COMPAZINE) 10 MG tablet Take 1 tablet by mouth every 6 hours as needed 16  Yes Nelia Velez MD   medroxyPROGESTERone (PROVERA) 2.5 MG tablet Take 2.5 mg by mouth every other day  14  Yes Historical Provider, MD   estradiol (ESTRACE) 0.5 MG tablet Take 0.5 mg by mouth daily. Yes Historical Provider, MD   atorvastatin (LIPITOR) 40 MG tablet  14  Yes Historical Provider, MD   Loratadine (CLARITIN PO) Take 10 mg by mouth daily.    Yes Historical Provider, MD atenolol (TENORMIN) 25 MG tablet Take 25 mg by mouth daily.    Yes Historical Provider, MD   VENTKENNETH  (90 Base) MCG/ACT inhaler INHALE 2 PUFFS INTO THE LUNGS EVERY 6 HOURS AS NEEDED FOR WHEEZING 3/13/18   Martin Maldonado MD     Allergies:    Acetaminophen and Amoxicillin-pot clavulanate [amoxicillin-pot clavulanate]  Social History:   Social History     Socioeconomic History    Marital status:      Spouse name: Not on file    Number of children: Not on file    Years of education: Not on file    Highest education level: Not on file   Occupational History    Not on file   Social Needs    Financial resource strain: Not on file    Food insecurity:     Worry: Not on file     Inability: Not on file    Transportation needs:     Medical: Not on file     Non-medical: Not on file   Tobacco Use    Smoking status: Former Smoker     Packs/day: 1.00     Years: 30.00     Pack years: 30.00     Types: Cigarettes     Last attempt to quit: 3/2/1999     Years since quittin.0    Smokeless tobacco: Never Used   Substance and Sexual Activity    Alcohol use: No    Drug use: No    Sexual activity: Not Currently   Lifestyle    Physical activity:     Days per week: Not on file     Minutes per session: Not on file    Stress: Not on file   Relationships    Social connections:     Talks on phone: Not on file     Gets together: Not on file     Attends Rastafarian service: Not on file     Active member of club or organization: Not on file     Attends meetings of clubs or organizations: Not on file     Relationship status: Not on file    Intimate partner violence:     Fear of current or ex partner: Not on file     Emotionally abused: Not on file     Physically abused: Not on file     Forced sexual activity: Not on file   Other Topics Concern    Not on file   Social History Narrative    ** Merged History Encounter **                Family History:   Family History   Problem Relation Age of Onset    Cancer Father GI CANCER    Crohn's Disease Sister          PHYSICAL EXAM:      /64   Pulse 51   Temp 96.8 °F (36 °C) (Temporal)   Resp 20   Ht 5' 3\" (1.6 m)   Wt 153 lb 5.3 oz (69.6 kg)   SpO2 98%   BMI 27.16 kg/m²  I        Heart: Normal    Lungs: Normal    Abdomen: Normal      ASA Grade: ASA 3 - Patient with moderate systemic disease with functional limitations    II (soft palate, uvula, fauces visible)  ASSESSMENT AND PLAN:    1. Patient is a 75 y.o. female here for Colonoscopy  2. Procedure options, risks and benefits reviewed with patient who expresses understanding.

## 2019-03-20 NOTE — BRIEF OP NOTE
Brief Postoperative Note    Susan Guerra  YOB: 1945  [id number]      Pre-operative Diagnosis: Diarrhea; family history of colon cancer    Previous Colonoscopy: Yes  When: 2009  Greater than 3 years? Yes      Post-operative Diagnosis: Same    Procedure: Colonoscopy    The preparation was excellent.     Anesthesia: MAC    Surgeons/Assistants: Shubham Estrada MD    Estimated Blood Loss: None    Complications: None, Bleeding    Specimens: Was Obtained: Random colon    Findings: See dictated report    Electronically signed by Shubham Estrada MD on 7/10/2017 at 7:45 AM

## 2019-03-21 NOTE — PROCEDURES
[address] [place] [address] 16                               COLONOSCOPY REPORT    PATIENT NAME: Mayank Nagy                        :        1945  MED REC NO:   [id number]                          ROOM:  ACCOUNT NO:   [de-identified]                           ADMIT DATE: 2019  PROVIDER:     Danielle Viveros MD    DATE OF PROCEDURE:  2019    REFERRING PROVIDER:  Harpreet Felix MD    PATIENT HISTORY:  This is a 65-year-old female, outpatient. INSTRUMENT USED:  Olympus PCF-H190L. MEDICATIONS OF PROCEDURE:  The patient was premedicated with Diprivan  intravenously as administered by the anesthesiology service. INDICATIONS:  The patient has presented with recurrent diarrhea. She  has a history of biopsy documented collagenous colitis via colonoscopy  in . She had been on no treatment. She also has a family history  of colon cancer. DESCRIPTION OF PROCEDURE:  The digital and anal exams were normal.  The  colonoscope was inserted to the cecum. The prep was excellent. There  was incidental sigmoid diverticulosis. No mucosal lesions were  identified. Random biopsies were obtained. IMPRESSION:  1. Sigmoid diverticulosis. 2.  Otherwise normal colonoscopy. 3.  Random colonic biopsies obtained. PLAN:  The patient will call the office for biopsy results and  recommendations. She should undergo a surveillance colonoscopy in five  years, health permitting.         Russel Wong MD    D: 2019 11:18:38       T: 2019 20:28:25     [id number]  Job#: [id number]     Doc#: [id number]    CC:  Danielle Viveros MD

## 2019-03-26 LAB
EKG ATRIAL RATE: 54 BPM
EKG DIAGNOSIS: NORMAL
EKG P AXIS: 44 DEGREES
EKG P-R INTERVAL: 214 MS
EKG Q-T INTERVAL: 452 MS
EKG QRS DURATION: 70 MS
EKG QTC CALCULATION (BAZETT): 428 MS
EKG R AXIS: 18 DEGREES
EKG T AXIS: 27 DEGREES
EKG VENTRICULAR RATE: 54 BPM

## 2019-03-26 PROCEDURE — 93010 ELECTROCARDIOGRAM REPORT: CPT | Performed by: INTERNAL MEDICINE

## 2019-04-01 ENCOUNTER — HOSPITAL ENCOUNTER (OUTPATIENT)
Dept: GENERAL RADIOLOGY | Age: 74
Discharge: HOME | End: 2019-04-01
Payer: MEDICARE

## 2019-04-01 ENCOUNTER — NURSE TRIAGE (OUTPATIENT)
Dept: OTHER | Facility: CLINIC | Age: 74
End: 2019-04-01

## 2019-04-01 ENCOUNTER — HOSPITAL ENCOUNTER (OUTPATIENT)
Age: 74
Discharge: HOME | End: 2019-04-01
Payer: MEDICARE

## 2019-04-01 ENCOUNTER — OFFICE VISIT (OUTPATIENT)
Dept: INTERNAL MEDICINE CLINIC | Age: 74
End: 2019-04-01
Payer: MEDICARE

## 2019-04-01 VITALS
BODY MASS INDEX: 27.28 KG/M2 | SYSTOLIC BLOOD PRESSURE: 108 MMHG | WEIGHT: 154 LBS | DIASTOLIC BLOOD PRESSURE: 60 MMHG | OXYGEN SATURATION: 92 % | HEART RATE: 74 BPM | TEMPERATURE: 98 F

## 2019-04-01 DIAGNOSIS — J44.0 COPD (CHRONIC OBSTRUCTIVE PULMONARY DISEASE) WITH ACUTE BRONCHITIS (HCC): Primary | ICD-10-CM

## 2019-04-01 DIAGNOSIS — J20.9 COPD (CHRONIC OBSTRUCTIVE PULMONARY DISEASE) WITH ACUTE BRONCHITIS (HCC): ICD-10-CM

## 2019-04-01 DIAGNOSIS — R07.89 CHEST WALL PAIN: ICD-10-CM

## 2019-04-01 DIAGNOSIS — J44.0 OBSTRUCTIVE CHRONIC BRONCHITIS WITH ACUTE BRONCHITIS (HCC): ICD-10-CM

## 2019-04-01 DIAGNOSIS — J06.9 ACUTE URI: ICD-10-CM

## 2019-04-01 DIAGNOSIS — J44.0 COPD (CHRONIC OBSTRUCTIVE PULMONARY DISEASE) WITH ACUTE BRONCHITIS (HCC): ICD-10-CM

## 2019-04-01 DIAGNOSIS — J20.9 COPD (CHRONIC OBSTRUCTIVE PULMONARY DISEASE) WITH ACUTE BRONCHITIS (HCC): Primary | ICD-10-CM

## 2019-04-01 PROCEDURE — 3023F SPIROM DOC REV: CPT | Performed by: INTERNAL MEDICINE

## 2019-04-01 PROCEDURE — G8419 CALC BMI OUT NRM PARAM NOF/U: HCPCS | Performed by: INTERNAL MEDICINE

## 2019-04-01 PROCEDURE — G8427 DOCREV CUR MEDS BY ELIG CLIN: HCPCS | Performed by: INTERNAL MEDICINE

## 2019-04-01 PROCEDURE — 1036F TOBACCO NON-USER: CPT | Performed by: INTERNAL MEDICINE

## 2019-04-01 PROCEDURE — 1090F PRES/ABSN URINE INCON ASSESS: CPT | Performed by: INTERNAL MEDICINE

## 2019-04-01 PROCEDURE — G8399 PT W/DXA RESULTS DOCUMENT: HCPCS | Performed by: INTERNAL MEDICINE

## 2019-04-01 PROCEDURE — 99214 OFFICE O/P EST MOD 30 MIN: CPT | Performed by: INTERNAL MEDICINE

## 2019-04-01 PROCEDURE — 1123F ACP DISCUSS/DSCN MKR DOCD: CPT | Performed by: INTERNAL MEDICINE

## 2019-04-01 PROCEDURE — 3017F COLORECTAL CA SCREEN DOC REV: CPT | Performed by: INTERNAL MEDICINE

## 2019-04-01 PROCEDURE — G8598 ASA/ANTIPLAT THER USED: HCPCS | Performed by: INTERNAL MEDICINE

## 2019-04-01 PROCEDURE — 71046 X-RAY EXAM CHEST 2 VIEWS: CPT

## 2019-04-01 PROCEDURE — G8926 SPIRO NO PERF OR DOC: HCPCS | Performed by: INTERNAL MEDICINE

## 2019-04-01 PROCEDURE — 4040F PNEUMOC VAC/ADMIN/RCVD: CPT | Performed by: INTERNAL MEDICINE

## 2019-04-01 RX ORDER — BUDESONIDE 3 MG/1
3 CAPSULE, COATED PELLETS ORAL 3 TIMES DAILY
COMMUNITY
End: 2019-05-13

## 2019-04-01 RX ORDER — AZITHROMYCIN 250 MG/1
TABLET, FILM COATED ORAL
Qty: 6 TABLET | Refills: 0 | Status: SHIPPED | OUTPATIENT
Start: 2019-04-01 | End: 2019-04-05

## 2019-04-01 RX ORDER — HYDROCODONE BITARTRATE AND IBUPROFEN 7.5; 2 MG/1; MG/1
1 TABLET, FILM COATED ORAL EVERY 8 HOURS PRN
Qty: 10 TABLET | Refills: 0 | Status: SHIPPED | OUTPATIENT
Start: 2019-04-01 | End: 2019-04-04

## 2019-04-01 ASSESSMENT — ENCOUNTER SYMPTOMS
COUGH: 1
SPUTUM PRODUCTION: 0
SHORTNESS OF BREATH: 1
NAUSEA: 0
DIARRHEA: 0

## 2019-04-01 ASSESSMENT — COPD QUESTIONNAIRES: COPD: 1

## 2019-04-01 NOTE — TELEPHONE ENCOUNTER
Reason for Disposition   Known COPD or other severe lung disease (i.e., bronchiectasis, cystic fibrosis, lung surgery) and worsening symptoms (i.e., increased sputum purulence or amount, increased breathing difficulty)    Protocols used: COUGH-ADULT-[place]  Patient called pre-service center [facility]) to schedule appointment, with red flag complaint, transferred to RN access for triage. Patient reports dry cough and pain with cough for the past few days. Patient reports pain in chest is intermittent with coughing and deep breaths, patient reports pain moves around to different parts of her chest and is not localized to one spot. Patient reports cough is non-productive and came on after having a cold. Patient reports hx of COPD and hx of pleurisy. Patient reports this pain she is experiencing feels like pleurisy.

## 2019-04-01 NOTE — PATIENT INSTRUCTIONS
Please call your pharmacy if you need any refills of your medication(s). Please call our office at [phone] if you don't hear from us about your test results. Bring an accurate list of your medications with you at every appointment to ensure that we have the correct information.     Our office hours are: Monday - Friday 8:30 am- 5 pm    Phone lines turn on at 8:30 am

## 2019-04-01 NOTE — PROGRESS NOTES
(PROVERA) 2.5 MG tablet, Take 2.5 mg by mouth every other day , Disp: , Rfl:   estradiol (ESTRACE) 0.5 MG tablet, Take 0.5 mg by mouth daily. , Disp: , Rfl:   atorvastatin (LIPITOR) 40 MG tablet, , Disp: , Rfl:   Loratadine (CLARITIN PO), Take 10 mg by mouth daily. , Disp: , Rfl:   atenolol (TENORMIN) 25 MG tablet, Take 25 mg by mouth daily. , Disp: , Rfl:     No current facility-administered medications for this visit. Allergies: Acetaminophen and Amoxicillin-pot clavulanate (amoxicillin-pot clavulanate)  Past Medical History:  No date: Allergic rhinitis  No date: Asthma  2013: Bunion, right foot  2010: CAD (coronary artery disease)  No date: CAD (coronary artery disease)  No date: Gout  2013: Hammertoe  No date: Hyperlipidemia  No date: Hypertension  2010: IBS (irritable bowel syndrome)  : Pneumonia  No date: Psoriasis  Past Surgical History:  2013: [place]; Right  : COLONOSCOPY      Comment:  neg.  3/20/2019: COLONOSCOPY; N/A      Comment:  COLONOSCOPY WITH BIOPSY performed by Irlanda Ambriz MD               at [address] [place] [address]  No date:  [facility]:  1998: EYE SURGERY      Comment:  left  2013: HAMMER TOE SURGERY; Right  Review of patient's family history indicates:  Problem: Cancer      Relation: Father          Age of Onset: (Not Specified)          Comment: GI CANCER  Problem: Crohn's Disease      Relation: Sister          Age of Onset: (Not Specified)    Social History    Tobacco Use      Smoking status: Former Smoker        Packs/day: 1.00        Years: 30.00        Pack years: 30        Types: Cigarettes        Quit date: 3/2/1999        Years since quittin.0      Smokeless tobacco: Never Used    Alcohol use: No      Chest Pain    This is a new problem. The current episode started in the past 7 days. The onset quality is sudden. The problem occurs intermittently. The problem has been unchanged.  The pain is present in the lateral region. The pain is moderate. The quality of the pain is described as pressure, sharp and tightness. The pain does not radiate. Associated symptoms include a cough and shortness of breath. Pertinent negatives include no nausea or sputum production. The cough has no precipitants. The cough is productive. Color change with cough: pale. Nothing relieves the cough. Nothing worsens the cough. The treatment provided no relief. Risk factors include smoking/tobacco exposure. Her past medical history is significant for COPD. Review of Systems   Constitutional: Positive for fatigue. Negative for chills. Respiratory: Positive for cough and shortness of breath. Negative for sputum production. Cardiovascular: Positive for chest pain. Gastrointestinal: Negative for diarrhea and nausea. Musculoskeletal: Positive for myalgias. Objective:   Physical Exam   Constitutional: Distressed: bilateral chest wall is tender. HENT:   Head: Normocephalic. Right Ear: External ear normal.   Left Ear: External ear normal.   Nose: Nose normal.   Mouth/Throat: Oropharynx is clear and moist. No oropharyngeal exudate. Eyes: Pupils are equal, round, and reactive to light. Conjunctivae and EOM are normal. No scleral icterus. Neck: Normal range of motion. No thyromegaly present. Cardiovascular: Normal rate and regular rhythm. No murmur heard. Pulmonary/Chest: Effort normal. She has no wheezes. She has rales (very fine rales at the bottom of both lower lungs.) in the right middle field, the right lower field, the left middle field and the left lower field. Abdominal: Soft. Bowel sounds are normal. There is no tenderness. Musculoskeletal: She exhibits no edema or tenderness. Lymphadenopathy:     She has no cervical adenopathy. Neurological: She is alert. Skin: Skin is warm. She is not diaphoretic. Assessment:      Encounter Diagnoses   Name Primary?     COPD (chronic obstructive pulmonary disease) with acute bronchitis ([facility] [address] Yes    Chest wall pain     Acute URI            Plan:      Cesar Izquierdo was seen today for cough. Diagnoses and all orders for this visit:    COPD (chronic obstructive pulmonary disease) with acute bronchitis (HCC)  -     XR CHEST STANDARD (2 VW); Future    Chest wall pain  -     XR CHEST STANDARD (2 VW); Future  -     HYDROcodone-ibuprofen (VICOPROFEN) 7.5-200 MG per tablet; Take 1 tablet by mouth every 8 hours as needed for Pain for up to 3 days. Acute URI    Other orders  -     azithromycin (ZITHROMAX Z-RODOLFO) 250 MG tablet; As directed. chest wall both side tender. Not likely pleurisy.           Esther Marcano MD

## 2019-04-05 ENCOUNTER — TELEPHONE (OUTPATIENT)
Dept: INTERNAL MEDICINE CLINIC | Age: 74
End: 2019-04-05

## 2019-04-05 DIAGNOSIS — J21.9 ACUTE BRONCHIOLITIS DUE TO UNSPECIFIED ORGANISM: Primary | ICD-10-CM

## 2019-04-05 RX ORDER — LEVOFLOXACIN 500 MG/1
500 TABLET, FILM COATED ORAL DAILY
Qty: 10 TABLET | Refills: 0 | Status: SHIPPED | OUTPATIENT
Start: 2019-04-05 | End: 2019-04-15 | Stop reason: SDUPTHER

## 2019-04-05 RX ORDER — METHYLPREDNISOLONE 4 MG/1
TABLET ORAL
Qty: 1 KIT | Refills: 0 | Status: SHIPPED | OUTPATIENT
Start: 2019-04-05 | End: 2019-04-11

## 2019-04-05 NOTE — TELEPHONE ENCOUNTER
Pt was seen on 4-1 for pneumonia. Pt is still having a sharp pain when she breathes and she has a low grade fever of 99. Pt wants to know what to do? Pl advise. Pl call [phone] and if no answer pl call [phone].

## 2019-04-05 NOTE — TELEPHONE ENCOUNTER
Rx's sent to [facility]. No answer on [phone], reached pt on [phone], she will  rx's and let us know how she's doing next week.

## 2019-04-15 ENCOUNTER — TELEPHONE (OUTPATIENT)
Dept: INTERNAL MEDICINE CLINIC | Age: 74
End: 2019-04-15

## 2019-04-15 ENCOUNTER — HOSPITAL ENCOUNTER (OUTPATIENT)
Age: 74
Discharge: HOME | End: 2019-04-15
Payer: MEDICARE

## 2019-04-15 ENCOUNTER — HOSPITAL ENCOUNTER (OUTPATIENT)
Dept: GENERAL RADIOLOGY | Age: 74
Discharge: HOME | End: 2019-04-15
Payer: MEDICARE

## 2019-04-15 DIAGNOSIS — J18.9 PNEUMONIA DUE TO INFECTIOUS ORGANISM, UNSPECIFIED LATERALITY, UNSPECIFIED PART OF LUNG: Primary | ICD-10-CM

## 2019-04-15 DIAGNOSIS — J18.9 PNEUMONIA DUE TO INFECTIOUS ORGANISM, UNSPECIFIED LATERALITY, UNSPECIFIED PART OF LUNG: ICD-10-CM

## 2019-04-15 DIAGNOSIS — J21.9 ACUTE BRONCHIOLITIS DUE TO UNSPECIFIED ORGANISM: ICD-10-CM

## 2019-04-15 PROCEDURE — 71046 X-RAY EXAM CHEST 2 VIEWS: CPT

## 2019-04-15 RX ORDER — LEVOFLOXACIN 500 MG/1
500 TABLET, FILM COATED ORAL DAILY
Qty: 10 TABLET | Refills: 0 | Status: ON HOLD | OUTPATIENT
Start: 2019-04-15 | End: 2019-04-25 | Stop reason: HOSPADM

## 2019-04-15 NOTE — TELEPHONE ENCOUNTER
Pt stopped in and stated she finished abx for pneumonia, does she need repeat cxr? Per Dr. Jatin Yates, pt needs repeat cxr. Order placed.

## 2019-04-20 ENCOUNTER — HOSPITAL ENCOUNTER (INPATIENT)
Age: 74
LOS: 5 days | Discharge: HOME | DRG: 871 | End: 2019-04-25
Attending: EMERGENCY MEDICINE | Admitting: INTERNAL MEDICINE
Payer: MEDICARE

## 2019-04-20 ENCOUNTER — APPOINTMENT (OUTPATIENT)
Dept: CT IMAGING | Age: 74
DRG: 871 | End: 2019-04-20
Payer: MEDICARE

## 2019-04-20 DIAGNOSIS — J18.9 MULTIFOCAL PNEUMONIA: Primary | ICD-10-CM

## 2019-04-20 DIAGNOSIS — Z78.9 FAILURE OF OUTPATIENT TREATMENT: ICD-10-CM

## 2019-04-20 DIAGNOSIS — J44.1 COPD EXACERBATION (HCC): ICD-10-CM

## 2019-04-20 DIAGNOSIS — Z87.891 FORMER SMOKER: ICD-10-CM

## 2019-04-20 PROBLEM — A41.9 SEPSIS (HCC): Status: ACTIVE | Noted: 2019-04-20

## 2019-04-20 LAB
A/G RATIO: 0.8 (ref 1.1–2.2)
ALBUMIN SERPL-MCNC: 3.1 G/DL (ref 3.4–5)
ALP BLD-CCNC: 79 U/L (ref 40–129)
ALT SERPL-CCNC: 15 U/L (ref 10–40)
ANION GAP SERPL CALCULATED.3IONS-SCNC: 14 MMOL/L (ref 3–16)
AST SERPL-CCNC: 29 U/L (ref 15–37)
BACTERIA: ABNORMAL /HPF
BASOPHILS ABSOLUTE: 0.1 K/UL (ref 0–0.2)
BASOPHILS RELATIVE PERCENT: 0.8 %
BILIRUB SERPL-MCNC: 0.3 MG/DL (ref 0–1)
BILIRUBIN URINE: NEGATIVE
BLOOD, URINE: NEGATIVE
BUN BLDV-MCNC: 24 MG/DL (ref 7–20)
CALCIUM SERPL-MCNC: 8.7 MG/DL (ref 8.3–10.6)
CHLORIDE BLD-SCNC: 96 MMOL/L (ref 99–110)
CLARITY: ABNORMAL
CO2: 25 MMOL/L (ref 21–32)
COLOR: YELLOW
CREAT SERPL-MCNC: 1.2 MG/DL (ref 0.6–1.2)
EOSINOPHILS ABSOLUTE: 0.2 K/UL (ref 0–0.6)
EOSINOPHILS RELATIVE PERCENT: 1.5 %
EPITHELIAL CELLS, UA: 10 /HPF (ref 0–5)
GFR AFRICAN AMERICAN: 53
GFR NON-AFRICAN AMERICAN: 44
GLOBULIN: 4 G/DL
GLUCOSE BLD-MCNC: 87 MG/DL (ref 70–99)
GLUCOSE URINE: NEGATIVE MG/DL
HCT VFR BLD CALC: 35.2 % (ref 36–48)
HEMOGLOBIN: 11.5 G/DL (ref 12–16)
HYALINE CASTS: 1 /LPF (ref 0–8)
KETONES, URINE: NEGATIVE MG/DL
LACTIC ACID: 1 MMOL/L (ref 0.4–2)
LEUKOCYTE ESTERASE, URINE: ABNORMAL
LYMPHOCYTES ABSOLUTE: 1.6 K/UL (ref 1–5.1)
LYMPHOCYTES RELATIVE PERCENT: 12.5 %
MCH RBC QN AUTO: 29.9 PG (ref 26–34)
MCHC RBC AUTO-ENTMCNC: 32.7 G/DL (ref 31–36)
MCV RBC AUTO: 91.5 FL (ref 80–100)
MICROSCOPIC EXAMINATION: YES
MONOCYTES ABSOLUTE: 1.4 K/UL (ref 0–1.3)
MONOCYTES RELATIVE PERCENT: 10.9 %
NEUTROPHILS ABSOLUTE: 9.6 K/UL (ref 1.7–7.7)
NEUTROPHILS RELATIVE PERCENT: 74.3 %
NITRITE, URINE: NEGATIVE
PDW BLD-RTO: 15.1 % (ref 12.4–15.4)
PH UA: 6 (ref 5–8)
PLATELET # BLD: 369 K/UL (ref 135–450)
PMV BLD AUTO: 7.8 FL (ref 5–10.5)
POTASSIUM SERPL-SCNC: 4.6 MMOL/L (ref 3.5–5.1)
PROTEIN UA: NEGATIVE MG/DL
RBC # BLD: 3.84 M/UL (ref 4–5.2)
RBC UA: 2 /HPF (ref 0–4)
SODIUM BLD-SCNC: 135 MMOL/L (ref 136–145)
SPECIFIC GRAVITY UA: 1.02 (ref 1–1.03)
TOTAL PROTEIN: 7.1 G/DL (ref 6.4–8.2)
URINE REFLEX TO CULTURE: YES
URINE TYPE: ABNORMAL
UROBILINOGEN, URINE: 0.2 E.U./DL
WBC # BLD: 13 K/UL (ref 4–11)
WBC UA: 5 /HPF (ref 0–5)

## 2019-04-20 PROCEDURE — 94760 N-INVAS EAR/PLS OXIMETRY 1: CPT

## 2019-04-20 PROCEDURE — 81001 URINALYSIS AUTO W/SCOPE: CPT

## 2019-04-20 PROCEDURE — 1200000000 HC SEMI PRIVATE

## 2019-04-20 PROCEDURE — 2580000003 HC RX 258: Performed by: PHYSICIAN ASSISTANT

## 2019-04-20 PROCEDURE — 94640 AIRWAY INHALATION TREATMENT: CPT

## 2019-04-20 PROCEDURE — 87086 URINE CULTURE/COLONY COUNT: CPT

## 2019-04-20 PROCEDURE — 99285 EMERGENCY DEPT VISIT HI MDM: CPT

## 2019-04-20 PROCEDURE — 87186 SC STD MICRODIL/AGAR DIL: CPT

## 2019-04-20 PROCEDURE — 80053 COMPREHEN METABOLIC PANEL: CPT

## 2019-04-20 PROCEDURE — 96365 THER/PROPH/DIAG IV INF INIT: CPT

## 2019-04-20 PROCEDURE — 96367 TX/PROPH/DG ADDL SEQ IV INF: CPT

## 2019-04-20 PROCEDURE — 71250 CT THORAX DX C-: CPT

## 2019-04-20 PROCEDURE — 94664 DEMO&/EVAL PT USE INHALER: CPT

## 2019-04-20 PROCEDURE — 85025 COMPLETE CBC W/AUTO DIFF WBC: CPT

## 2019-04-20 PROCEDURE — 83605 ASSAY OF LACTIC ACID: CPT

## 2019-04-20 PROCEDURE — 87077 CULTURE AEROBIC IDENTIFY: CPT

## 2019-04-20 PROCEDURE — 6370000000 HC RX 637 (ALT 250 FOR IP): Performed by: PHYSICIAN ASSISTANT

## 2019-04-20 PROCEDURE — 6360000002 HC RX W HCPCS: Performed by: PHYSICIAN ASSISTANT

## 2019-04-20 PROCEDURE — 87040 BLOOD CULTURE FOR BACTERIA: CPT

## 2019-04-20 RX ORDER — IPRATROPIUM BROMIDE AND ALBUTEROL SULFATE 2.5; .5 MG/3ML; MG/3ML
1 SOLUTION RESPIRATORY (INHALATION) ONCE
Status: COMPLETED | OUTPATIENT
Start: 2019-04-20 | End: 2019-04-20

## 2019-04-20 RX ORDER — ALBUTEROL SULFATE 2.5 MG/3ML
5 SOLUTION RESPIRATORY (INHALATION) ONCE
Status: COMPLETED | OUTPATIENT
Start: 2019-04-20 | End: 2019-04-20

## 2019-04-20 RX ADMIN — PIPERACILLIN SODIUM,TAZOBACTAM SODIUM 3.38 G: 3; .375 INJECTION, POWDER, FOR SOLUTION INTRAVENOUS at 21:15

## 2019-04-20 RX ADMIN — ALBUTEROL SULFATE 5 MG: 2.5 SOLUTION RESPIRATORY (INHALATION) at 19:38

## 2019-04-20 RX ADMIN — VANCOMYCIN HYDROCHLORIDE 1000 MG: 1 INJECTION, POWDER, LYOPHILIZED, FOR SOLUTION INTRAVENOUS at 21:47

## 2019-04-20 RX ADMIN — IPRATROPIUM BROMIDE AND ALBUTEROL SULFATE 1 AMPULE: .5; 3 SOLUTION RESPIRATORY (INHALATION) at 19:38

## 2019-04-20 NOTE — ED PROVIDER NOTES
[facility]  eMERGENCY dEPARTMENT eNCOUnter        Pt Name: Myra Delgadillo  MRN: [id number]  [place] 1945  Date of evaluation: 4/20/2019  Provider: Jann Gonslaez PA-C  PCP: Valerie Hayes MD    This patient was seen and evaluated by the attending physician Ale Patrick MD.      [address] [place] [address]       Chief Complaint   Patient presents with    Fatigue     dx with pneumonia on 4/1. she has been on abx (z-pack, levofloxacin). no improvement.  Fever     100.7 today. HISTORY OF PRESENT ILLNESS   (Location/Symptom, Timing/Onset, Context/Setting, Quality, Duration, Modifying Factors, Severity)  Note limiting factors. Myra Delgadillo is a 75 y.o. female patient presented with her . Patient presented with complaint of continued shortness of breath, wheezing and possible continuation of pneumonia. The patient and beginning symptoms late March and was seen by PCP April 1, 2019 and treated at that time with Z-Brendan and x-ray showing pneumonia left lower lobe. Patient did not respond adequately was seen again on routine, 2019 at which time she was treated with Levaquin and prednisone. She at that this time with continued complaints. Patient does state she had confirmation of emphysema/COPD diagnosed July 17, 2017 by high-resolution CT imaging. The patient is a former smoker beginning H 24 and quit 1998. The patient states she continues having shortness of breath with associated cough with rare productivity. She does indicate temp has been up and down and today on admission is 99.2. Eyes any chest pain, gastrointestinal or urinary symptoms. The patient's first chest x-ray April 1, 2018 and it suggested CT scan. Patient did have a follow-up chest x-ray in April 15, 2019. Nursing Notes were all reviewed and agreed with or any disagreements were addressed  in the HPI.     REVIEW OF SYSTEMS    (2-9 systems for level 4, 10 or more for level 5) Review of Systems    Positives and Pertinent negatives as per HPI. Except as noted abovein the ROS, all other systems were reviewed and negative. PAST MEDICAL HISTORY     Past Medical History:   Diagnosis Date    Allergic rhinitis     Asthma     Bunion, right foot 12/2013    CAD (coronary artery disease) 6/7/2010    CAD (coronary artery disease)     Gout     Hammertoe 12/12/2013    Hyperlipidemia     Hypertension     IBS (irritable bowel syndrome) 6/7/2010    Obstructive chronic bronchitis with acute bronchitis ([address] 4/1/2019    Pneumonia 2010    Psoriasis          SURGICAL HISTORY     Past Surgical History:   Procedure Laterality Date    BUNIONECTOMY Right 12/12/2013    COLONOSCOPY  2009    neg.  COLONOSCOPY N/A 3/20/2019    COLONOSCOPY WITH BIOPSY performed by Sangita Choi MD at [address]  2010    left    HAMMER TOE SURGERY Right 12/12/2013         CURRENTMEDICATIONS       Previous Medications    ALLOPURINOL (ZYLOPRIM) 300 MG TABLET    TAKE 1 TABLET EVERY DAY    ASPIRIN 325 MG TABLET    Take 325 mg by mouth daily    ATENOLOL (TENORMIN) 25 MG TABLET    Take 25 mg by mouth daily. ATORVASTATIN (LIPITOR) 40 MG TABLET        BUDESONIDE (ENTOCORT EC) 3 MG EXTENDED RELEASE CAPSULE    Take 3 mg by mouth 3 times daily    CLOBETASOL (TEMOVATE) 0.05 % CREAM    Apply topically 2 times daily    ESTRADIOL (ESTRACE) 0.5 MG TABLET    Take 0.5 mg by mouth daily. LEVOFLOXACIN (LEVAQUIN) 500 MG TABLET    Take 1 tablet by mouth daily for 10 days    LORATADINE (CLARITIN PO)    Take 10 mg by mouth daily.     LOSARTAN (COZAAR) 50 MG TABLET    Take 0.5 tablets by mouth daily    MEDROXYPROGESTERONE (PROVERA) 2.5 MG TABLET    Take 2.5 mg by mouth every other day     PROCHLORPERAZINE (COMPAZINE) 10 MG TABLET    Take 1 tablet by mouth every 6 hours as needed    TRIAMTERENE-HYDROCHLOROTHIAZIDE (MAXZIDE-25) 37.5-25 MG PER TABLET Take 1 tablet by mouth daily    VENTOLIN  (90 BASE) MCG/ACT INHALER    INHALE 2 PUFFS INTO THE LUNGS EVERY 6 HOURS AS NEEDED FOR WHEEZING         ALLERGIES     Acetaminophen and Amoxicillin-pot clavulanate [amoxicillin-pot clavulanate]    FAMILYHISTORY       Family History   Problem Relation Age of Onset    Cancer Father         GI CANCER    Crohn's Disease Sister           SOCIAL HISTORY       Social History     Socioeconomic History    Marital status:      Spouse name: None    Number of children: None    Years of education: None    Highest education level: None   Occupational History    None   Social Needs    Financial resource strain: None    Food insecurity:     Worry: None     Inability: None    Transportation needs:     Medical: None     Non-medical: None   Tobacco Use    Smoking status: Former Smoker     Packs/day: 1.00     Years: 30.00     Pack years: 30.00     Types: Cigarettes     Last attempt to quit: 3/2/1999     Years since quittin.1    Smokeless tobacco: Never Used   Substance and Sexual Activity    Alcohol use: No    Drug use: No    Sexual activity: Not Currently   Lifestyle    Physical activity:     Days per week: None     Minutes per session: None    Stress: None   Relationships    Social connections:     Talks on phone: None     Gets together: None     Attends Confucianist service: None     Active member of club or organization: None     Attends meetings of clubs or organizations: None     Relationship status: None    Intimate partner violence:     Fear of current or ex partner: None     Emotionally abused: None     Physically abused: None     Forced sexual activity: None   Other Topics Concern    None   Social History Narrative    ** Merged History Encounter **            SCREENINGS             PHYSICAL EXAM    (up to 7 for level 4, 8 or more for level 5)     ED Triage Vitals [19 1813]   BP Temp Temp Source Pulse Resp SpO2 Height Weight   110/68 99.2 °F (37.3 °C) Oral 87 18 94 % 5' 3\" (1.6 m) 150 lb 9.2 oz (68.3 kg)       Physical Exam   Constitutional: She is oriented to person, place, and time. She appears well-developed and well-nourished. HENT:   Head: Normocephalic and atraumatic. Right Ear: External ear normal.   Left Ear: External ear normal.   Mouth/Throat: Oropharynx is clear and moist.   Eyes: Conjunctivae are normal. Right eye exhibits no discharge. Left eye exhibits no discharge. No scleral icterus. Neck: Normal range of motion. Cardiovascular: Normal rate, regular rhythm and normal heart sounds. Pulmonary/Chest: Effort normal. No stridor. She has wheezes. Musculoskeletal: Normal range of motion. Neurological: She is alert and oriented to person, place, and time. Skin: Skin is warm and dry. Psychiatric: She has a normal mood and affect. Her behavior is normal. Judgment and thought content normal.   Nursing note and vitals reviewed.       DIAGNOSTIC RESULTS   LABS:    Labs Reviewed   CBC WITH AUTO DIFFERENTIAL - Abnormal; Notable for the following components:       Result Value    WBC 13.0 (*)     RBC 3.84 (*)     Hemoglobin 11.5 (*)     Hematocrit 35.2 (*)     Neutrophils # 9.6 (*)     Monocytes # 1.4 (*)     All other components within normal limits    Narrative:     Performed at:  [facility]  [address] [facility] [address]   Phone [phone]   COMPREHENSIVE METABOLIC PANEL - Abnormal; Notable for the following components:    Sodium 135 (*)     Chloride 96 (*)     BUN 24 (*)     GFR Non- 44 (*)     GFR  53 (*)     Alb 3.1 (*)     Albumin/Globulin Ratio 0.8 (*)     All other components within normal limits    Narrative:     Performed at:  [facility]  [address] [place], [place] [address]   Phone [phone]   URINE RT REFLEX TO CULTURE - Abnormal; Notable for the following components:    Clarity, UA CLOUDY (*) and CT findings consistent with bilateral atypical pneumonia/patchy infiltrates to suggest multifocal pneumonia. ER treatment consists of DuoNeb ×1, albuterol 5 mg with improved air flow and diminished wheezing. After resulting CT scan the patient will be treated with Zosyn and vancomycin. Hospitalist be contacted for admission for management of outpatient treatment failure and positive CT findings. FINAL IMPRESSION      1. Multifocal pneumonia    2. Failure of outpatient treatment    3. COPD exacerbation ([facility] [address]    4. Former smoker          DISPOSITION/PLAN   DISPOSITION Admitted 04/20/2019 11:12:19 PM      PATIENT REFERREDTO:  Martin Maldonado MD  [address] [place] [address]. [facility] [address]  [phone]            DISCHARGE MEDICATIONS:  New Prescriptions    No medications on file       DISCONTINUED MEDICATIONS:  Discontinued Medications    No medications on file              (Please note that portions ofthis note were completed with a voice recognition program.  Efforts were made to edit the dictations but occasionally words are mis-transcribed. )    Yun Treadwell PA-C (electronically signed)           Yun Treadwell PA-C  04/20/19 [id number]

## 2019-04-21 PROBLEM — K52.831 COLLAGENOUS COLITIS: Status: ACTIVE | Noted: 2019-04-21

## 2019-04-21 LAB
LACTIC ACID, SEPSIS: 1.5 MMOL/L (ref 0.4–1.9)
LACTIC ACID, SEPSIS: 2.3 MMOL/L (ref 0.4–1.9)
PROCALCITONIN: 0.09 NG/ML (ref 0–0.15)
REPORT: NORMAL
RESPIRATORY PANEL PCR: NORMAL

## 2019-04-21 PROCEDURE — 2580000003 HC RX 258: Performed by: INTERNAL MEDICINE

## 2019-04-21 PROCEDURE — 83605 ASSAY OF LACTIC ACID: CPT

## 2019-04-21 PROCEDURE — 2700000000 HC OXYGEN THERAPY PER DAY

## 2019-04-21 PROCEDURE — 87633 RESP VIRUS 12-25 TARGETS: CPT

## 2019-04-21 PROCEDURE — 1200000000 HC SEMI PRIVATE

## 2019-04-21 PROCEDURE — 6360000002 HC RX W HCPCS: Performed by: INTERNAL MEDICINE

## 2019-04-21 PROCEDURE — 36415 COLL VENOUS BLD VENIPUNCTURE: CPT

## 2019-04-21 PROCEDURE — 94760 N-INVAS EAR/PLS OXIMETRY 1: CPT

## 2019-04-21 PROCEDURE — 87486 CHLMYD PNEUM DNA AMP PROBE: CPT

## 2019-04-21 PROCEDURE — 84145 PROCALCITONIN (PCT): CPT

## 2019-04-21 PROCEDURE — 87581 M.PNEUMON DNA AMP PROBE: CPT

## 2019-04-21 PROCEDURE — 6370000000 HC RX 637 (ALT 250 FOR IP): Performed by: INTERNAL MEDICINE

## 2019-04-21 PROCEDURE — 99223 1ST HOSP IP/OBS HIGH 75: CPT | Performed by: INTERNAL MEDICINE

## 2019-04-21 PROCEDURE — 87798 DETECT AGENT NOS DNA AMP: CPT

## 2019-04-21 RX ORDER — OXYCODONE HYDROCHLORIDE AND ACETAMINOPHEN 5; 325 MG/1; MG/1
1 TABLET ORAL EVERY 8 HOURS PRN
Status: DISCONTINUED | OUTPATIENT
Start: 2019-04-21 | End: 2019-04-21

## 2019-04-21 RX ORDER — SODIUM CHLORIDE 9 MG/ML
INJECTION, SOLUTION INTRAVENOUS CONTINUOUS
Status: DISCONTINUED | OUTPATIENT
Start: 2019-04-21 | End: 2019-04-25 | Stop reason: HOSPADM

## 2019-04-21 RX ORDER — MAGNESIUM SULFATE 1 G/100ML
1 INJECTION INTRAVENOUS PRN
Status: DISCONTINUED | OUTPATIENT
Start: 2019-04-21 | End: 2019-04-25 | Stop reason: HOSPADM

## 2019-04-21 RX ORDER — ATENOLOL 25 MG/1
25 TABLET ORAL DAILY
Status: DISCONTINUED | OUTPATIENT
Start: 2019-04-21 | End: 2019-04-25 | Stop reason: HOSPADM

## 2019-04-21 RX ORDER — ASPIRIN 325 MG
325 TABLET ORAL DAILY
Status: DISCONTINUED | OUTPATIENT
Start: 2019-04-21 | End: 2019-04-25 | Stop reason: HOSPADM

## 2019-04-21 RX ORDER — SODIUM CHLORIDE 0.9 % (FLUSH) 0.9 %
10 SYRINGE (ML) INJECTION PRN
Status: DISCONTINUED | OUTPATIENT
Start: 2019-04-21 | End: 2019-04-25 | Stop reason: HOSPADM

## 2019-04-21 RX ORDER — POTASSIUM CHLORIDE 7.45 MG/ML
10 INJECTION INTRAVENOUS PRN
Status: DISCONTINUED | OUTPATIENT
Start: 2019-04-21 | End: 2019-04-25 | Stop reason: HOSPADM

## 2019-04-21 RX ORDER — ESTRADIOL 1 MG/1
0.5 TABLET ORAL DAILY
Status: DISCONTINUED | OUTPATIENT
Start: 2019-04-21 | End: 2019-04-25 | Stop reason: HOSPADM

## 2019-04-21 RX ORDER — POTASSIUM CHLORIDE 20 MEQ/1
40 TABLET, EXTENDED RELEASE ORAL PRN
Status: DISCONTINUED | OUTPATIENT
Start: 2019-04-21 | End: 2019-04-25 | Stop reason: HOSPADM

## 2019-04-21 RX ORDER — ACETAMINOPHEN 325 MG/1
650 TABLET ORAL EVERY 4 HOURS PRN
Status: DISCONTINUED | OUTPATIENT
Start: 2019-04-21 | End: 2019-04-21

## 2019-04-21 RX ORDER — SODIUM CHLORIDE 0.9 % (FLUSH) 0.9 %
10 SYRINGE (ML) INJECTION EVERY 12 HOURS SCHEDULED
Status: DISCONTINUED | OUTPATIENT
Start: 2019-04-21 | End: 2019-04-25 | Stop reason: HOSPADM

## 2019-04-21 RX ORDER — MEDROXYPROGESTERONE ACETATE 2.5 MG/1
2.5 TABLET ORAL EVERY OTHER DAY
Status: DISCONTINUED | OUTPATIENT
Start: 2019-04-21 | End: 2019-04-25 | Stop reason: HOSPADM

## 2019-04-21 RX ORDER — ATORVASTATIN CALCIUM 40 MG/1
40 TABLET, FILM COATED ORAL DAILY
Status: DISCONTINUED | OUTPATIENT
Start: 2019-04-21 | End: 2019-04-25 | Stop reason: HOSPADM

## 2019-04-21 RX ORDER — ALBUTEROL SULFATE 90 UG/1
2 AEROSOL, METERED RESPIRATORY (INHALATION) EVERY 6 HOURS PRN
Status: DISCONTINUED | OUTPATIENT
Start: 2019-04-21 | End: 2019-04-24

## 2019-04-21 RX ORDER — IBUPROFEN 400 MG/1
400 TABLET ORAL EVERY 8 HOURS PRN
Status: DISCONTINUED | OUTPATIENT
Start: 2019-04-21 | End: 2019-04-25 | Stop reason: HOSPADM

## 2019-04-21 RX ADMIN — ENOXAPARIN SODIUM 40 MG: 40 INJECTION SUBCUTANEOUS at 08:18

## 2019-04-21 RX ADMIN — ESTRADIOL 0.5 MG: 1 TABLET ORAL at 08:18

## 2019-04-21 RX ADMIN — ASPIRIN 325 MG ORAL TABLET 325 MG: 325 PILL ORAL at 08:19

## 2019-04-21 RX ADMIN — ATORVASTATIN CALCIUM 40 MG: 40 TABLET, FILM COATED ORAL at 08:19

## 2019-04-21 RX ADMIN — IBUPROFEN 400 MG: 400 TABLET ORAL at 22:18

## 2019-04-21 RX ADMIN — CEFEPIME HYDROCHLORIDE 1 G: 1 INJECTION, POWDER, FOR SOLUTION INTRAMUSCULAR; INTRAVENOUS at 13:58

## 2019-04-21 RX ADMIN — CEFEPIME HYDROCHLORIDE 1 G: 1 INJECTION, POWDER, FOR SOLUTION INTRAMUSCULAR; INTRAVENOUS at 01:54

## 2019-04-21 RX ADMIN — SODIUM CHLORIDE: 9 INJECTION, SOLUTION INTRAVENOUS at 18:58

## 2019-04-21 RX ADMIN — SODIUM CHLORIDE: 9 INJECTION, SOLUTION INTRAVENOUS at 01:17

## 2019-04-21 RX ADMIN — MEDROXYPROGESTERONE ACETATE 2.5 MG: 2.5 TABLET ORAL at 08:19

## 2019-04-21 RX ADMIN — SODIUM CHLORIDE: 9 INJECTION, SOLUTION INTRAVENOUS at 08:18

## 2019-04-21 RX ADMIN — ATENOLOL 25 MG: 25 TABLET ORAL at 08:19

## 2019-04-21 ASSESSMENT — PAIN SCALES - GENERAL
PAINLEVEL_OUTOF10: 0

## 2019-04-21 NOTE — PROGRESS NOTES
Pt admitted to Medsurg/Tele. Pt oriented to room and call light. Pt UAL, agreeable to call appropriately as needed. VS stable, BP 90/50's. Dr. Wendy Camacho rounding on unit and aware. PRN order for ibuprofen obtained. Pt allergic to acetaminophen. RR even and unlabored at this time, pt on 2L O2 via NC. Tele monitor placed on pt, NSR. Will monitor.   Electronically signed by Alcides Mccoy RN on 4/21/19 at 1:17 AM

## 2019-04-21 NOTE — ED NOTES
Handoff given to [employer]. No further questions at this time.       Inez Suazo RN  04/20/19 0847

## 2019-04-21 NOTE — ED NOTES
Repot called to [facility]. SBAR discussed. Will arrange for transport to floor when room clean.       Urban Fu RN  04/21/19 0850

## 2019-04-21 NOTE — H&P
Hospital Medicine History & Physical      PCP: Shade Diaz MD    Date of Admission: 4/20/2019    Date of Service: Pt seen/examined on 4/21/19  and Admitted to Inpatient with expected LOS greater than two midnights due to medical therapy. Chief Complaint:  sob    History Of Present Illness:    75 y.o. female who presented to [facility] [place] with a one-month history of shortness of breath and cough. Patient states that she has been on multiple rounds of antibiotics prescribed by her PCP for pneumonia. Patient states she's just been not getting any better. She has received Zithromax initially early in the month and then has been on a prolonged course of Levaquin. Patient states her cough is nonproductive. She admits to dyspnea. Denies fevers or chills. Denies weight loss. Patient does seem to think that much of her symptoms got worse after she returned from [facility] on a vacation where she stayed in a cabin. End of March. Patient has a history of smoking but quit many years ago. Denies leg swelling. Denies chest pain. Denies abdominal pain. Denies hemoptysis. Denies unintentional weight loss    Past Medical History:          Diagnosis Date    Allergic rhinitis     Asthma     Bunion, right foot 12/2013    CAD (coronary artery disease) 6/7/2010    CAD (coronary artery disease)     Gout     Hammertoe 12/12/2013    Hyperlipidemia     Hypertension     IBS (irritable bowel syndrome) 6/7/2010    Obstructive chronic bronchitis with acute bronchitis ([facility] [address] 4/1/2019    Pneumonia 2010    Psoriasis        Past Surgical History:          Procedure Laterality Date    BUNIONECTOMY Right 12/12/2013    COLONOSCOPY  2009    neg.     COLONOSCOPY N/A 3/20/2019    COLONOSCOPY WITH BIOPSY performed by Naima Del Toro MD at [address]  2010    left    HAMMER TOE SURGERY Right 12/12/2013       Medications Prior to Admission:      Prior to Admission medications    Medication Sig Start Date End Date Taking? Authorizing Provider   levofloxacin (LEVAQUIN) 500 MG tablet Take 1 tablet by mouth daily for 10 days 4/15/19 4/25/19 Yes Maine Josue MD   budesonide (ENTOCORT EC) 3 MG extended release capsule Take 3 mg by mouth 3 times daily   Yes Historical Provider, MD   aspirin 325 MG tablet Take 325 mg by mouth daily   Yes Historical Provider, MD   triamterene-hydrochlorothiazide (MAXZIDE-25) 37.5-25 MG per tablet Take 1 tablet by mouth daily   Yes Historical Provider, MD   allopurinol (ZYLOPRIM) 300 MG tablet TAKE 1 TABLET EVERY DAY 2/28/19  Yes Maine Josue MD   VENTOLIN  (90 Base) MCG/ACT inhaler INHALE 2 PUFFS INTO THE LUNGS EVERY 6 HOURS AS NEEDED FOR WHEEZING 3/13/18  Yes Maine Josue MD   losartan (COZAAR) 50 MG tablet Take 0.5 tablets by mouth daily 3/30/17  Yes Francheska Hoff MD   prochlorperazine (COMPAZINE) 10 MG tablet Take 1 tablet by mouth every 6 hours as needed 4/14/16  Yes Maine Josue MD   medroxyPROGESTERone (PROVERA) 2.5 MG tablet Take 2.5 mg by mouth every other day  12/17/14  Yes Historical Provider, MD   estradiol (ESTRACE) 0.5 MG tablet Take 0.5 mg by mouth daily. Yes Historical Provider, MD   atorvastatin (LIPITOR) 40 MG tablet  12/1/14  Yes Historical Provider, MD   Loratadine (CLARITIN PO) Take 10 mg by mouth daily. Yes Historical Provider, MD   atenolol (TENORMIN) 25 MG tablet Take 25 mg by mouth daily. Yes Historical Provider, MD   clobetasol (TEMOVATE) 0.05 % cream Apply topically 2 times daily 6/26/17   Maine Josue MD       Allergies:  Acetaminophen and Amoxicillin-pot clavulanate [amoxicillin-pot clavulanate]    Social History:      The patient currently livesw     TOBACCO:   reports that she quit smoking about 20 years ago. Her smoking use included cigarettes. She has a 30.00 pack-year smoking history.  She has never used smokeless tobacco.  ETOH:   reports that she does not drink alcohol. Family History:       Reviewed in detail and negative for DM, CAD, Cancer, CVA. Positive as follows:        Problem Relation Age of Onset    Cancer Father         GI CANCER    Crohn's Disease Sister        REVIEW OF SYSTEMS:   Pertinent positives as noted in the HPI. All other systems reviewed and negative. PHYSICAL EXAM PERFORMED:    BP (!) 98/59   Pulse 91   Temp 98.4 °F (36.9 °C)   Resp 16   Ht 5' 3\" (1.6 m)   Wt 152 lb 5.4 oz (69.1 kg)   SpO2 97%   BMI 26.99 kg/m²     General appearance:  No apparent distress, appears stated age and cooperative. HEENT:  Normal cephalic, atraumatic without obvious deformity. Pupils equal, round, and reactive to light. Extra ocular muscles intact. Conjunctivae/corneas clear. Neck: Supple, with full range of motion. No jugular venous distention. Trachea midline. Respiratory:  Scattered wheezes on end expiration  Cardiovascular:  Regular rate and rhythm with normal S1/S2 without murmurs, rubs or gallops. Abdomen: Soft, non-tender, non-distended with normal bowel sounds. Musculoskeletal:  No clubbing, cyanosis or edema bilaterally. Full range of motion without deformity. Skin: Skin color, texture, turgor normal.  No rashes or lesions. Neurologic:  Neurovascularly intact without any focal sensory/motor deficits. Cranial nerves: II-XII intact, grossly non-focal.  Psychiatric:  Alert and oriented, thought content appropriate, normal insight  Capillary Refill: Brisk,< 3 seconds   Peripheral Pulses: +2 palpable, equal bilaterally       Labs:     Recent Labs     04/20/19 1911   WBC 13.0*   HGB 11.5*   HCT 35.2*        Recent Labs     04/20/19 1911   *   K 4.6   CL 96*   CO2 25   BUN 24*   CREATININE 1.2   CALCIUM 8.7     Recent Labs     04/20/19 1911   AST 29   ALT 15   BILITOT 0.3   ALKPHOS 79     No results for input(s): INR in the last 72 hours. No results for input(s): [employer] in the last 72 hours.     Urinalysis: Lab Results   Component Value Date    NITRU Negative 04/20/2019    WBCUA 5 04/20/2019    BACTERIA RARE 04/20/2019    RBCUA 2 04/20/2019    BLOODU Negative 04/20/2019    SPECGRAV 1.016 04/20/2019    GLUCOSEU Negative 04/20/2019       Radiology:     reviewed    CT CHEST WO CONTRAST   Final Result   1. Patchy infiltrates in the bilateral lungs as described typical pneumonia. Follow-up IV contrast-enhanced CT chest in 3-4 months is recommended to   ensure clearing. 2. Acute pulmonary changes are on a background of sequela from smoking   including emphysema. 3. Calcific atherosclerosis aorta and coronary arteries. RECOMMENDATIONS:   IV contrast-enhanced CT chest in 3-4 months             ASSESSMENT:    Sepsis  Multifocal pneumonia , non resolving. HTN  Hyperlipidemia    PLAN:    cefipime iv  Check procalcitonin level  Blood and sputum cx sent  HHN q 4 and q 2 prn  Pulmonary consult-concern for non infectious lung problem or atypical infection?need for bronch  prob outpt pfts  Continue maxide, tenormin and cozaar for htn hold for sbp<100   Continue statin    DVT Prophylaxis: lovenox  Diet: DIET LOW SODIUM 2 GM;  Code Status: Full Code        Dispo - 2 d       Casper Gamez MD    Thank you Yevgeniy Grove MD for the opportunity to be involved in this patient's care. If you have any questions or concerns please feel free to contact me at [phone].

## 2019-04-21 NOTE — CONSULTS
REASON FOR CONSULTATION/CC: recurrent pneumonia      Consult at request of Sherrie Reyes MD     PCP: Meche Magallon MD  Established Pulmonologist: \ None    Chief Complaint   Patient presents with    Fatigue     dx with pneumonia on 4/1. she has been on abx (z-pack, levofloxacin). no improvement.  Fever     100.7 today. HISTORY OF PRESENT ILLNESS: Sadie Tristan is a 75y.o. year old female with a history of emphysema who presents with ~1 month of waxing and waning infectious symptoms including fevers/chills, dry cough, shortness of breath. Initially dx with acute bronchitis and took zpak but sx returned following 5 day course. Was put on 10 days of levaquin but again sx returned, was planned for another 10 day course but came in for fever on day 3, or so, of 2nd levaquin regimen. She has never had sputum production with cough. She says he symptoms are worse when she recently travelled to [place] and [facility]. Former smoker. CT scan shows scattered small foci of consolidations/ part ground glass. Past Medical History:   Diagnosis Date    Allergic rhinitis     Asthma     Bunion, right foot 12/2013    CAD (coronary artery disease) 6/7/2010    CAD (coronary artery disease)     Gout     Hammertoe 12/12/2013    Hyperlipidemia     Hypertension     IBS (irritable bowel syndrome) 6/7/2010    Obstructive chronic bronchitis with acute bronchitis ([address] 4/1/2019    Pneumonia 2010    Psoriasis          Past Surgical History:   Procedure Laterality Date    BUNIONECTOMY Right 12/12/2013    COLONOSCOPY  2009    neg.  COLONOSCOPY N/A 3/20/2019    COLONOSCOPY WITH BIOPSY performed by Sangita Choi MD at [address]  2010    left    HAMMER TOE SURGERY Right 12/12/2013       Family Hx  family history includes Cancer in her father; Crohn's Disease in her sister.     Social Hx   reports that she quit smoking about 20 years ago. Her smoking use included cigarettes. She has a 30.00 pack-year smoking history. She has never used smokeless tobacco.    Scheduled Meds:   aspirin  325 mg Oral Daily    atenolol  25 mg Oral Daily    atorvastatin  40 mg Oral Daily    estradiol  0.5 mg Oral Daily    medroxyPROGESTERone  2.5 mg Oral Every Other Day    sodium chloride flush  10 mL Intravenous 2 times per day    enoxaparin  40 mg Subcutaneous Daily    cefepime  1 g Intravenous Q12H       Continuous Infusions:   sodium chloride 75 mL/hr at 04/21/19 1109       PRN Meds:  albuterol sulfate HFA, sodium chloride flush, ibuprofen, magnesium sulfate, potassium chloride **OR** potassium alternative oral replacement **OR** potassium chloride, iopamidol    ALLERGIES:  Patient is allergic to acetaminophen and amoxicillin-pot clavulanate [amoxicillin-pot clavulanate]. REVIEW OF SYSTEMS:  Constitutional: +fever  HENT: Negative for sore throat  Eyes: Negative for redness   Respiratory: +dyspnea, +cough  Cardiovascular: Negative for chest pain  Gastrointestinal: Negative for vomiting, diarrhea   Genitourinary: Negative for hematuria   Musculoskeletal: Negative for arthralgias   Skin: Negative for rash  Neurological: Negative for syncope  Hematological: Negative for adenopathy  Psychiatric/Behavorial: Negative for anxiety    Objective:   PHYSICAL EXAM:  Blood pressure 105/60, pulse 87, temperature 98.2 °F (36.8 °C), temperature source Oral, resp. rate 18, height 5' 3\" (1.6 m), weight 152 lb 5.4 oz (69.1 kg), SpO2 95 %, not currently breastfeeding.'  Gen:  No acute distress. Eyes: PERRL. Anicteric sclera. No conjunctival injection. ENT: No discharge. Posterior oropharynx clear. External appearance of ears and nose normal.  Neck: Trachea midline. No mass   Resp:  No crackles. No wheezes. No rhonchi. No dullness on percussion. CV: Regular rate. Regular rhythm. No murmur or rub. No edema. GI: Soft, Non-tender. Non-distended. +BS  Skin: Warm, dry, w/o erythema. Lymph: No cervical or supraclavicular LAD. M/S: No cyanosis. No clubbing. Neuro:  CN 2-12 tested, no focal neurologic deficit. Moves all extremities  Psych: Awake and alert, Oriented x 3. Judgement and insight appropriate. Mood stable. Data Reviewed:   LABS:  CBC:   Recent Labs     04/20/19 1911   WBC 13.0*   HGB 11.5*   HCT 35.2*   MCV 91.5        BMP:   Recent Labs     04/20/19 1911   *   K 4.6   CL 96*   CO2 25   BUN 24*   CREATININE 1.2     LIVER PROFILE:   Recent Labs     04/20/19 1911   AST 29   ALT 15   BILITOT 0.3   ALKPHOS 79     PT/INR: No results for input(s): PROTIME, INR in the last 72 hours. APTT: No results for input(s): APTT in the last 72 hours. UA:  Recent Labs     04/20/19 1911   COLORU YELLOW   PHUR 6.0   WBCUA 5   RBCUA 2   BACTERIA RARE*   CLARITYU CLOUDY*   SPECGRAV 1.016   LEUKOCYTESUR SMALL*   UROBILINOGEN 0.2   BILIRUBINUR Negative   BLOODU Negative   GLUCOSEU Negative     No results for input(s): PHART, IFO9LKM, PO2ART in the last 72 hours. Vent Information  Skin Assessment: Clean, dry, & intact    Radiology Review:  Pertinent images / reports were reviewed as a part of this visit. CT Chest per my interpretation, shows scattered small foci of consolidation or inflammation. At least partly ground grlass. Underlying emphysema. CT Chest w/o contrast:   Results for orders placed during the hospital encounter of 04/20/19   CT CHEST WO CONTRAST    Narrative EXAMINATION:  CT OF THE CHEST WITHOUT CONTRAST 4/20/2019 8:02 pm    TECHNIQUE:  CT of the chest was performed without the administration of intravenous  contrast. Multiplanar reformatted images are provided for review. Dose  modulation, iterative reconstruction, and/or weight based adjustment of the  mA/kV was utilized to reduce the radiation dose to as low as reasonably  achievable.     COMPARISON:  High-resolution CT chest 07/17/2017    HISTORY:  Fatigue, dx with pneumonia on 4/1. she has been on abx (z-pack,  levofloxacin). no improvement. Acuity: Acute    FINDINGS:  Mediastinum: Cardiac structures and great vessels appear unremarkable with  exception of calcific atherosclerotic disease. No pericardial effusion. Posterior mediastinal structures appear unremarkable. No mediastinal or  hilar adenopathy. Lungs/pleura: Stable appearance of biapical scarring with new focal  infiltrate posteromedial left upper lobe and anterolateral lingula left upper  lobe, as well as patchy alveolar and infiltrative densities bilateral lower  lungs, concerning for pneumonia. A few scattered tree-in-bud configured  opacities lateral lower right lung are typical of infectious process, with  similar appearing opacities lateral within the lingula left upper lobe. Sequela from smoking including emphysema predominates in the upper lungs. No  inspissated secretions or endobronchial lesion evident. No pleural effusion  or pneumothorax. Upper Abdomen: Unremarkable appearance. Soft Tissues/Bones: No acute superficial soft tissue or osseous structure  abnormality evident. Impression 1. Patchy infiltrates in the bilateral lungs as described typical pneumonia. Follow-up IV contrast-enhanced CT chest in 3-4 months is recommended to  ensure clearing. 2. Acute pulmonary changes are on a background of sequela from smoking  including emphysema. 3. Calcific atherosclerosis aorta and coronary arteries. RECOMMENDATIONS:  IV contrast-enhanced CT chest in 3-4 months         CTPA: No results found for this or any previous visit.     CXR PA/LAT:   Results for orders placed during the hospital encounter of 04/15/19   XR CHEST STANDARD (2 VW)    Narrative EXAMINATION:  TWO VIEWS OF THE CHEST    4/15/2019 1:34 pm    COMPARISON:  04/01/2019    HISTORY:  ORDERING SYSTEM PROVIDED HISTORY: Pneumonia due to infectious organism,  unspecified laterality, unspecified part of lung  TECHNOLOGIST PROVIDED HISTORY:  Reason for exam:->pneumonia  Ordering Physician Provided Reason for Exam: follow up pneumonia  Acuity: Acute  Type of Exam: Initial  Relevant Medical/Surgical History: former smoker cad borderline copd    FINDINGS:  Normal heart size and pulmonary vasculature. Aortic calcifications. Slight  improvement of left basilar consolidation suggesting resolving pneumonia or  atelectasis. Stable opacities in the right lateral upper lung likely  scarring. No effusion. No pneumothorax. Hyperinflated lungs with flattened  diaphragms compatible with emphysematous changes      Impression Slight improvement in left basilar consolidation suggesting resolving  pneumonia or atelectasis. Continued follow-up advised to ensure complete  resolution. Emphysematous changes. CXR portable: No results found for this or any previous visit. Outside hospital records reviewed as well. Assessment:       Acute Hypoxemic Respiratory Failure with SAO2 <90% on Room Air  Multifocal Pneumonia  Emphysema  Recurrent Bronchitis    Plan:   -unclear that this is truly an unresolving pneumonia as infiltrates are quite mild, and procalcitonin is negative. Could represent successfully treated pneumonia with lingering radiographic infiltrates, or viral process. -There is reasonable concern for noninfectious inflammatory lung disease;  Considerations include , vasculitis, CTD. Also possible fungal etiology.  -From pulmonary standpoing pt okay for discharge with 7 days of antibiotics and follow up in my clinic. Can trial PO 3rd gen cephalosporin like cefdinir or cefpodoxine. We can pursue bronchoscopy as an outpatient.   Alternatively can check bronch schedule when they open tomorrow to see about possible availability on Tuesday.    -Wean supplemental oxygen to goal saturation of >90%  -agree with empiric cefepime given outpatient failure, will attempt to obtain RVP and resp culture.  -may be able to deescalate, procal is negative so may be that pna was in fact effectively treated  -will need f/u CT scan in 6-8 weeks to ensure resolution. This note was transcribed using [id number]. Please disregard any translational errors.     Thank you for the consult    [address]  Pulmonary, Sleep and Critical Care Medicine

## 2019-04-21 NOTE — CONSULTS
Subcutaneous, Daily  0.9 % sodium chloride infusion, , Intravenous, Continuous  cefepime (MAXIPIME) 1 g IVPB minibag, 1 g, Intravenous, Q12H  ibuprofen (ADVIL;MOTRIN) tablet 400 mg, 400 mg, Oral, Q8H PRN  magnesium sulfate 1 g in dextrose 5% 100 mL IVPB, 1 g, Intravenous, PRN  potassium chloride (KLOR-CON M) extended release tablet 40 mEq, 40 mEq, Oral, PRN **OR** potassium bicarb-citric acid (EFFER-K) effervescent tablet 40 mEq, 40 mEq, Oral, PRN **OR** potassium chloride 10 mEq/100 mL IVPB (Peripheral Line), 10 mEq, Intravenous, PRN  iopamidol (ISOVUE-370) 76 % injection 75 mL, 75 mL, Intravenous, ONCE PRN  Allergies:  Acetaminophen and Amoxicillin-pot clavulanate [amoxicillin-pot clavulanate]    Social History:    Social History     Socioeconomic History    Marital status:      Spouse name: Not on file    Number of children: Not on file    Years of education: Not on file    Highest education level: Not on file   Occupational History    Not on file   Social Needs    Financial resource strain: Not on file    Food insecurity:     Worry: Not on file     Inability: Not on file    Transportation needs:     Medical: Not on file     Non-medical: Not on file   Tobacco Use    Smoking status: Former Smoker     Packs/day: 1.00     Years: 30.00     Pack years: 30.00     Types: Cigarettes     Last attempt to quit: 3/2/1999     Years since quittin.1    Smokeless tobacco: Never Used   Substance and Sexual Activity    Alcohol use: No    Drug use: No    Sexual activity: Not Currently   Lifestyle    Physical activity:     Days per week: Not on file     Minutes per session: Not on file    Stress: Not on file   Relationships    Social connections:     Talks on phone: Not on file     Gets together: Not on file     Attends Muslim service: Not on file     Active member of club or organization: Not on file     Attends meetings of clubs or organizations: Not on file     Relationship status: Not on file   [facility] 04/20/2019     Hepatic Function Panel:    Lab Results   Component Value Date    ALKPHOS 79 04/20/2019    ALT 15 04/20/2019    AST 29 04/20/2019    PROT 7.1 04/20/2019    PROT 7.2 01/16/2013    BILITOT 0.3 04/20/2019    BILIDIR 0.18 01/16/2013    IBILI 0.4 01/16/2013    LABALBU 3.1 04/20/2019     Albumin:    Lab Results   Component Value Date    LABALBU 3.1 04/20/2019     Calcium:    Lab Results   Component Value Date    CALCIUM 8.7 04/20/2019     Ionized Calcium:  No results found for: IONCA  Magnesium:  No results found for: MG  Phosphorus:  No results found for: PHOS    IMPRESSION/RECOMMENDATIONS:      1. History of collagenous colitis resolved with budesonide. She denies diarrhea. She does not need budesonide at the present time. Can see Dr Rita Lambert in the office as needed.     Electronically signed by Buddy An MD on 4/21/2019 at 11:58 AM

## 2019-04-21 NOTE — PROGRESS NOTES
Patient alert and oriented x 4. Respirations easy and even. Denies pain or discomfort. Denies shortness of breath. Oxygen saturation 98% on 2 liters NC. Will attempt to wean oxygen to 1 liter NC. Patient instructed to call nurse if becomes short of breath. Call light in reach. Will monitor.

## 2019-04-21 NOTE — CONSULTS
Clinical Pharmacy Note  Vancomycin Consult    Betina Jain is a 75 y.o. female ordered Vancomycin for pneumonia; consult received from [place], [place] (ED) to manage therapy. Also receiving Piperacillin/Tazobactam.    Patient Active Problem List   Diagnosis    IBS (irritable bowel syndrome)    CAD (coronary artery disease)    Gout, arthritis    IBS (irritable bowel syndrome)    CAD (coronary artery disease)    Bunion, right foot    Hammertoe    HTN (hypertension)    Hyperlipidemia    Obstructive chronic bronchitis with acute bronchitis (HCC)       Allergies:  Acetaminophen and Amoxicillin-pot clavulanate [amoxicillin-pot clavulanate]     Temp max:  Temp (24hrs), Av.2 °F (37.3 °C), Min:99.2 °F (37.3 °C), Max:99.2 °F (37.3 °C)      Recent Labs     19   WBC 13.0*       Recent Labs     19   BUN 24*   CREATININE 1.2       No intake or output data in the 24 hours ending 19 210    Culture Results:      Ht Readings from Last 1 Encounters:   19 5' 3\" (1.6 m)        Wt Readings from Last 1 Encounters:   19 150 lb 9.2 oz (68.3 kg)         Estimated Creatinine Clearance: 38 mL/min (based on SCr of 1.2 mg/dL). Assessment/Plan:  Will initiate vancomycin 1000 mg IV x 1 dose in ED. If patient is admitted and Vancomycin is to continue, pharmacy will gladly continue dosing vancomycin if consulted by admitting/attending MD      Thank you for the consult.      Chandrakant Joyner, [address] [place] [address]  2019  9:04 PM

## 2019-04-21 NOTE — PROGRESS NOTES
Initial lactic was WDL, repeat lactic elevated at 2.3. Provider NP [facility] notified. IV NaCl infusing at 150 mL/hr as of now. Waiting for provider to respond.    Electronically signed by Maxwell Lovell RN on 4/21/19 at 2:27 AM

## 2019-04-22 LAB
ALBUMIN SERPL-MCNC: 2.7 G/DL (ref 3.4–5)
ANION GAP SERPL CALCULATED.3IONS-SCNC: 12 MMOL/L (ref 3–16)
BASOPHILS ABSOLUTE: 0 K/UL (ref 0–0.2)
BASOPHILS RELATIVE PERCENT: 0.4 %
BUN BLDV-MCNC: 18 MG/DL (ref 7–20)
CALCIUM SERPL-MCNC: 8.2 MG/DL (ref 8.3–10.6)
CHLORIDE BLD-SCNC: 104 MMOL/L (ref 99–110)
CO2: 22 MMOL/L (ref 21–32)
CREAT SERPL-MCNC: 1 MG/DL (ref 0.6–1.2)
EOSINOPHILS ABSOLUTE: 0.3 K/UL (ref 0–0.6)
EOSINOPHILS RELATIVE PERCENT: 3.2 %
GFR AFRICAN AMERICAN: >60
GFR NON-AFRICAN AMERICAN: 54
GLUCOSE BLD-MCNC: 92 MG/DL (ref 70–99)
HCT VFR BLD CALC: 32 % (ref 36–48)
HEMOGLOBIN: 10.3 G/DL (ref 12–16)
LYMPHOCYTES ABSOLUTE: 1 K/UL (ref 1–5.1)
LYMPHOCYTES RELATIVE PERCENT: 10.7 %
MAGNESIUM: 1.6 MG/DL (ref 1.8–2.4)
MCH RBC QN AUTO: 29.7 PG (ref 26–34)
MCHC RBC AUTO-ENTMCNC: 32.3 G/DL (ref 31–36)
MCV RBC AUTO: 91.8 FL (ref 80–100)
MONOCYTES ABSOLUTE: 1.1 K/UL (ref 0–1.3)
MONOCYTES RELATIVE PERCENT: 10.8 %
NEUTROPHILS ABSOLUTE: 7.4 K/UL (ref 1.7–7.7)
NEUTROPHILS RELATIVE PERCENT: 74.9 %
PDW BLD-RTO: 14.8 % (ref 12.4–15.4)
PHOSPHORUS: 2.3 MG/DL (ref 2.5–4.9)
PLATELET # BLD: 318 K/UL (ref 135–450)
PMV BLD AUTO: 7.5 FL (ref 5–10.5)
POTASSIUM SERPL-SCNC: 3.1 MMOL/L (ref 3.5–5.1)
RBC # BLD: 3.48 M/UL (ref 4–5.2)
RHEUMATOID FACTOR: 22 IU/ML
SODIUM BLD-SCNC: 138 MMOL/L (ref 136–145)
WBC # BLD: 9.8 K/UL (ref 4–11)

## 2019-04-22 PROCEDURE — 87205 SMEAR GRAM STAIN: CPT

## 2019-04-22 PROCEDURE — 80069 RENAL FUNCTION PANEL: CPT

## 2019-04-22 PROCEDURE — 6370000000 HC RX 637 (ALT 250 FOR IP): Performed by: INTERNAL MEDICINE

## 2019-04-22 PROCEDURE — 99232 SBSQ HOSP IP/OBS MODERATE 35: CPT | Performed by: INTERNAL MEDICINE

## 2019-04-22 PROCEDURE — 6360000002 HC RX W HCPCS: Performed by: INTERNAL MEDICINE

## 2019-04-22 PROCEDURE — 87070 CULTURE OTHR SPECIMN AEROBIC: CPT

## 2019-04-22 PROCEDURE — 1200000000 HC SEMI PRIVATE

## 2019-04-22 PROCEDURE — 83735 ASSAY OF MAGNESIUM: CPT

## 2019-04-22 PROCEDURE — 86255 FLUORESCENT ANTIBODY SCREEN: CPT

## 2019-04-22 PROCEDURE — 85025 COMPLETE CBC W/AUTO DIFF WBC: CPT

## 2019-04-22 PROCEDURE — 86200 CCP ANTIBODY: CPT

## 2019-04-22 PROCEDURE — 86038 ANTINUCLEAR ANTIBODIES: CPT

## 2019-04-22 PROCEDURE — 86431 RHEUMATOID FACTOR QUANT: CPT

## 2019-04-22 PROCEDURE — 2580000003 HC RX 258: Performed by: INTERNAL MEDICINE

## 2019-04-22 PROCEDURE — 94760 N-INVAS EAR/PLS OXIMETRY 1: CPT

## 2019-04-22 PROCEDURE — 87305 ASPERGILLUS AG IA: CPT

## 2019-04-22 PROCEDURE — 36415 COLL VENOUS BLD VENIPUNCTURE: CPT

## 2019-04-22 RX ADMIN — ENOXAPARIN SODIUM 40 MG: 40 INJECTION SUBCUTANEOUS at 09:09

## 2019-04-22 RX ADMIN — MAGNESIUM SULFATE HEPTAHYDRATE 1 G: 1 INJECTION, SOLUTION INTRAVENOUS at 11:25

## 2019-04-22 RX ADMIN — POTASSIUM CHLORIDE 40 MEQ: 20 TABLET, EXTENDED RELEASE ORAL at 11:24

## 2019-04-22 RX ADMIN — ASPIRIN 325 MG ORAL TABLET 325 MG: 325 PILL ORAL at 09:09

## 2019-04-22 RX ADMIN — ATORVASTATIN CALCIUM 40 MG: 40 TABLET, FILM COATED ORAL at 09:08

## 2019-04-22 RX ADMIN — ESTRADIOL 0.5 MG: 1 TABLET ORAL at 09:09

## 2019-04-22 RX ADMIN — ATENOLOL 25 MG: 25 TABLET ORAL at 09:09

## 2019-04-22 RX ADMIN — CEFEPIME HYDROCHLORIDE 1 G: 1 INJECTION, POWDER, FOR SOLUTION INTRAMUSCULAR; INTRAVENOUS at 15:02

## 2019-04-22 RX ADMIN — MAGNESIUM SULFATE HEPTAHYDRATE 1 G: 1 INJECTION, SOLUTION INTRAVENOUS at 19:52

## 2019-04-22 RX ADMIN — SODIUM CHLORIDE: 9 INJECTION, SOLUTION INTRAVENOUS at 09:17

## 2019-04-22 RX ADMIN — CEFEPIME HYDROCHLORIDE 1 G: 1 INJECTION, POWDER, FOR SOLUTION INTRAMUSCULAR; INTRAVENOUS at 02:18

## 2019-04-22 ASSESSMENT — PAIN SCALES - GENERAL
PAINLEVEL_OUTOF10: 0
PAINLEVEL_OUTOF10: 0

## 2019-04-22 NOTE — PROGRESS NOTES
Hospital Medicine    Progress Note    Admit Date: 2019    PCP: Valerie Hayes MD                  : 1945  MRN: [id number]    Subjective: Interval History: Patient seen and examined. Episode of fever. Urine culture pending. Pulm planning on Bronch in a.m. Diet: DIET LOW SODIUM 2 GM;  Diet NPO, After Midnight      Data:   Scheduled Meds:   aspirin  325 mg Oral Daily    atenolol  25 mg Oral Daily    atorvastatin  40 mg Oral Daily    estradiol  0.5 mg Oral Daily    medroxyPROGESTERone  2.5 mg Oral Every Other Day    sodium chloride flush  10 mL Intravenous 2 times per day    enoxaparin  40 mg Subcutaneous Daily    cefepime  1 g Intravenous Q12H     Continuous Infusions:   sodium chloride 75 mL/hr at 19 0917     PRN Meds:albuterol sulfate HFA, sodium chloride flush, ibuprofen, magnesium sulfate, potassium chloride **OR** potassium alternative oral replacement **OR** potassium chloride, iopamidol  I/O last 3 completed shifts: In: 9681 [P.O.:240; I.V.:919]  Out: -   No intake/output data recorded.     Intake/Output Summary (Last 24 hours) at 2019 0957  Last data filed at 2019 1859  Gross per 24 hour   Intake 1159 ml   Output --   Net 1159 ml         All labs past 24 hours:   Recent Results (from the past 24 hour(s))   Respiratory Panel, Film Array    Collection Time: 19  2:02 PM   Result Value Ref Range    Respiratory Panel PCR       Respiratory Pathogens Panel PCR Result: Not Detected  See additional report for complete Respiratory Pathogens Panel     Respiratory Panel Film Array Report    Collection Time: 19  2:02 PM   Result Value Ref Range    Report SEE IMAGE    CBC Auto Differential    Collection Time: 19  8:58 AM   Result Value Ref Range    WBC 9.8 4.0 - 11.0 K/uL    RBC 3.48 (L) 4.00 - 5.20 M/uL    Hemoglobin 10.3 (L) 12.0 - 16.0 g/dL    Hematocrit 32.0 (L) 36.0 - 48.0 %    MCV 91.8 80.0 - 100.0 fL    MCH 29.7 26.0 - 34.0 pg    MCHC 32.3 31.0 - 36.0 g/dL    RDW 14.8 12.4 - 15.4 %    Platelets 036 850 - 155 K/uL    MPV 7.5 5.0 - 10.5 fL    Neutrophils % 74.9 %    Lymphocytes % 10.7 %    Monocytes % 10.8 %    Eosinophils % 3.2 %    Basophils % 0.4 %    Neutrophils # 7.4 1.7 - 7.7 K/uL    Lymphocytes # 1.0 1.0 - 5.1 K/uL    Monocytes # 1.1 0.0 - 1.3 K/uL    Eosinophils # 0.3 0.0 - 0.6 K/uL    Basophils # 0.0 0.0 - 0.2 K/uL   Renal Function Panel    Collection Time: 04/22/19  8:58 AM   Result Value Ref Range    Sodium 138 136 - 145 mmol/L    Potassium 3.1 (L) 3.5 - 5.1 mmol/L    Chloride 104 99 - 110 mmol/L    CO2 22 21 - 32 mmol/L    Anion Gap 12 3 - 16    Glucose 92 70 - 99 mg/dL    BUN 18 7 - 20 mg/dL    CREATININE 1.0 0.6 - 1.2 mg/dL    GFR Non-African American 54 (A) >60    GFR African American >60 >60    Calcium 8.2 (L) 8.3 - 10.6 mg/dL    Phosphorus 2.3 (L) 2.5 - 4.9 mg/dL    Alb 2.7 (L) 3.4 - 5.0 g/dL   Magnesium    Collection Time: 04/22/19  8:58 AM   Result Value Ref Range    Magnesium 1.60 (L) 1.80 - 2.40 mg/dL       -----------------------------------------------------------------  Imaging  Xr Chest Standard (2 Vw)    Result Date: 4/15/2019  EXAMINATION: TWO VIEWS OF THE CHEST 4/15/2019 1:34 pm COMPARISON: 04/01/2019 HISTORY: ORDERING SYSTEM PROVIDED HISTORY: Pneumonia due to infectious organism, unspecified laterality, unspecified part of lung TECHNOLOGIST PROVIDED HISTORY: Reason for exam:->pneumonia Ordering Physician Provided Reason for Exam: follow up pneumonia Acuity: Acute Type of Exam: Initial Relevant Medical/Surgical History: former smoker cad borderline copd FINDINGS: Normal heart size and pulmonary vasculature. Aortic calcifications. Slight improvement of left basilar consolidation suggesting resolving pneumonia or atelectasis. Stable opacities in the right lateral upper lung likely scarring. No effusion. No pneumothorax.   Hyperinflated lungs with flattened diaphragms compatible with emphysematous changes     Slight improvement in left basilar consolidation suggesting resolving pneumonia or atelectasis. Continued follow-up advised to ensure complete resolution. Emphysematous changes. Xr Chest Standard (2 Vw)    Result Date: 4/1/2019  EXAMINATION: TWO VIEWS OF THE CHEST 4/1/2019 3:58 pm COMPARISON: 03/30/2017 HISTORY: ORDERING SYSTEM PROVIDED HISTORY: COPD (chronic obstructive pulmonary disease) with acute bronchitis ([address] TECHNOLOGIST PROVIDED HISTORY: Reason for exam:->chest wall pain. Ordering Physician Provided Reason for Exam: pt has sob and pain in her rib cage for the last few days. no injury Acuity: Acute Type of Exam: Initial Relevant Medical/Surgical History: COPD FINDINGS: Two views were obtained of the chest.  Linear opacity within the right suprahilar region extending to the lateral pleural margin with nodular pleuroparenchymal thickening is similar to prior. A previously demonstrated nodular opacity that had been seen more inferior on the prior examination has nearly resolved with minimal residual opacity. New heterogeneous left perihilar opacity is seen as well as a nodular opacity at the left base. No pneumothorax. Biapical pleuroparenchymal thickening. Cardiac and mediastinal silhouettes are similar to prior. Calcification of aorta. Developing left perihilar and left basilar opacity as compared to prior. Correlate for signs or symptoms of pneumonia. Given the nodular density at the left base, follow-up to resolution is recommended. If this does not resolve, chest CT would be recommended for further assessment. Ct Chest Wo Contrast    Result Date: 4/20/2019  EXAMINATION: CT OF THE CHEST WITHOUT CONTRAST 4/20/2019 8:02 pm TECHNIQUE: CT of the chest was performed without the administration of intravenous contrast. Multiplanar reformatted images are provided for review.  Dose modulation, iterative reconstruction, and/or weight based adjustment of the mA/kV was utilized to reduce the radiation dose to as low as reasonably achievable. COMPARISON: High-resolution CT chest 07/17/2017 HISTORY: Fatigue, dx with pneumonia on 4/1. she has been on abx (z-pack, levofloxacin). no improvement. Acuity: Acute FINDINGS: Mediastinum: Cardiac structures and great vessels appear unremarkable with exception of calcific atherosclerotic disease. No pericardial effusion. Posterior mediastinal structures appear unremarkable. No mediastinal or hilar adenopathy. Lungs/pleura: Stable appearance of biapical scarring with new focal infiltrate posteromedial left upper lobe and anterolateral lingula left upper lobe, as well as patchy alveolar and infiltrative densities bilateral lower lungs, concerning for pneumonia. A few scattered tree-in-bud configured opacities lateral lower right lung are typical of infectious process, with similar appearing opacities lateral within the lingula left upper lobe. Sequela from smoking including emphysema predominates in the upper lungs. No inspissated secretions or endobronchial lesion evident. No pleural effusion or pneumothorax. Upper Abdomen: Unremarkable appearance. Soft Tissues/Bones: No acute superficial soft tissue or osseous structure abnormality evident. 1. Patchy infiltrates in the bilateral lungs as described typical pneumonia. Follow-up IV contrast-enhanced CT chest in 3-4 months is recommended to ensure clearing. 2. Acute pulmonary changes are on a background of sequela from smoking including emphysema. 3. Calcific atherosclerosis aorta and coronary arteries. RECOMMENDATIONS: IV contrast-enhanced CT chest in 3-4 months         Objective:   Vitals: BP (!) 99/56   Pulse 75   Temp 98.1 °F (36.7 °C) (Oral)   Resp 16   Ht 5' 3\" (1.6 m)   Wt 152 lb 5.4 oz (69.1 kg)   SpO2 95%   BMI 26.99 kg/m²   General appearance: Laying in bed  Skin: Warm  HEENT: Head: Normocephalic, no lesions, without obvious abnormality.   Neck: no adenopathy, no carotid bruit, no JVD, supple, symmetrical, trachea midline

## 2019-04-22 NOTE — FLOWSHEET NOTE
Pt rested well overnight with no c/o pain or respiratory distress. Was given ibuprofen at the beginning of the shift for fever. Afebrile since ibuprofen given. Possible discharge today. Remains on antibiotic therapy with no adverse effects. Call light in reach.

## 2019-04-22 NOTE — PROGRESS NOTES
Patient awake in bed A&OX4. LS  Clear and diminished in bilat lower lobes, productive cough noted with clear blood tinged sputum. Cx sent to lab. Denies SOB. BS +[id number], ABD soft non-tender denies nausea. Pedal pulses present in BLE no noted edema. Denies further needs call light and belongings are within reach.  Electronically signed by Tomeka Regalado RN on 7/61/2524 at 10:48 AM

## 2019-04-22 NOTE — PROGRESS NOTES
Pulmonary Progress Note    Date of Admission: 4/20/2019   LOS: 2 days     Chief Complaint   Patient presents with    Fatigue     dx with pneumonia on 4/1. she has been on abx (z-pack, levofloxacin). no improvement.  Fever     100.7 today. f/u unresolving pneumonia    Assessment:     Multifocal Pneumonia  Recurrent Fever  Emphysema  Recurrent Acute Bronchitis    Plan:      -still having intermittent fever despite broad spectrum antibiotic, procal is negative. Suspicion for noninfectious etiology. -will check NASREEN, ANCA, rf, ccp, galactomannan  -plan for Bronchoscopy with BAL tmw at noon, can r/o atypical infections, fungal, eosinophilic PNAs. -npo @ midnight    HPI/Subjective  Tmax 100.4F past 24h, states her breathign is at baseline. Tolerating RA today. ROS:   No nausea  No Vomiting  No chest pain      Intake/Output Summary (Last 24 hours) at 4/22/2019 0918  Last data filed at 4/21/2019 1859  Gross per 24 hour   Intake 1159 ml   Output --   Net 1159 ml         PHYSICAL EXAM:   Blood pressure (!) 99/56, pulse 75, temperature 98.1 °F (36.7 °C), temperature source Oral, resp. rate 16, height 5' 3\" (1.6 m), weight 152 lb 5.4 oz (69.1 kg), SpO2 95 %, not currently breastfeeding.'  Gen:  No acute distress. Eyes: PERRL. Anicteric sclera. No conjunctival injection. ENT: No discharge. Posterior oropharynx clear. External appearance of ears and nose normal.  Neck: Trachea midline. No mass   Resp:  No crackles. No wheezes. No rhonchi. No dullness on percussion. CV: Regular rate. Regular rhythm. No murmur or rub. No edema. GI: Soft, Non-tender. Non-distended. +BS  Skin: Warm, dry, w/o erythema. Lymph: No cervical or supraclavicular LAD. M/S: No cyanosis. No clubbing. Neuro:  CN 2-12 tested, no focal neurologic deficit. Moves all extremities  Psych: Awake and alert, Oriented x 3. Judgement and insight appropriate. Mood stable.       Medications:    Scheduled Meds:   aspirin  325 mg Oral Daily    the  mA/kV was utilized to reduce the radiation dose to as low as reasonably  achievable. COMPARISON:  High-resolution CT chest 07/17/2017    HISTORY:  Fatigue, dx with pneumonia on 4/1. she has been on abx (z-pack,  levofloxacin). no improvement. Acuity: Acute    FINDINGS:  Mediastinum: Cardiac structures and great vessels appear unremarkable with  exception of calcific atherosclerotic disease. No pericardial effusion. Posterior mediastinal structures appear unremarkable. No mediastinal or  hilar adenopathy. Lungs/pleura: Stable appearance of biapical scarring with new focal  infiltrate posteromedial left upper lobe and anterolateral lingula left upper  lobe, as well as patchy alveolar and infiltrative densities bilateral lower  lungs, concerning for pneumonia. A few scattered tree-in-bud configured  opacities lateral lower right lung are typical of infectious process, with  similar appearing opacities lateral within the lingula left upper lobe. Sequela from smoking including emphysema predominates in the upper lungs. No  inspissated secretions or endobronchial lesion evident. No pleural effusion  or pneumothorax. Upper Abdomen: Unremarkable appearance. Soft Tissues/Bones: No acute superficial soft tissue or osseous structure  abnormality evident. Impression 1. Patchy infiltrates in the bilateral lungs as described typical pneumonia. Follow-up IV contrast-enhanced CT chest in 3-4 months is recommended to  ensure clearing. 2. Acute pulmonary changes are on a background of sequela from smoking  including emphysema. 3. Calcific atherosclerosis aorta and coronary arteries. RECOMMENDATIONS:  IV contrast-enhanced CT chest in 3-4 months         CTPA: No results found for this or any previous visit.     CXR PA/LAT:   Results for orders placed during the hospital encounter of 04/15/19   XR CHEST STANDARD (2 VW)    Narrative EXAMINATION:  TWO VIEWS OF THE CHEST    4/15/2019 1:34 pm    COMPARISON:  04/01/2019    HISTORY:  ORDERING SYSTEM PROVIDED HISTORY: Pneumonia due to infectious organism,  unspecified laterality, unspecified part of lung  TECHNOLOGIST PROVIDED HISTORY:  Reason for exam:->pneumonia  Ordering Physician Provided Reason for Exam: follow up pneumonia  Acuity: Acute  Type of Exam: Initial  Relevant Medical/Surgical History: former smoker cad borderline copd    FINDINGS:  Normal heart size and pulmonary vasculature. Aortic calcifications. Slight  improvement of left basilar consolidation suggesting resolving pneumonia or  atelectasis. Stable opacities in the right lateral upper lung likely  scarring. No effusion. No pneumothorax. Hyperinflated lungs with flattened  diaphragms compatible with emphysematous changes      Impression Slight improvement in left basilar consolidation suggesting resolving  pneumonia or atelectasis. Continued follow-up advised to ensure complete  resolution. Emphysematous changes. CXR portable: No results found for this or any previous visit.      Thank you for this consult,    Julius Haro [address] [place] [address] Pulmonary, Critical Care, and Sleep Medicine

## 2019-04-22 NOTE — PLAN OF CARE
Pt free from falls this shift. Fall precautions in place at all times. Call light always within reach. Pt able and agreeable to contact for safety appropriately.

## 2019-04-22 NOTE — CARE COORDINATION
INITIAL CASE MANAGEMENT ASSESSMENT    Reviewed chart, met with patient to assess possible discharge needs. Explained Case Management role/services. Living Situation: patient lives with her  in a single level home    ADLs: Independent     DME: none used    PT/OT Recs: None ordered     Active Services: none     Transportation: Active . Reports family will be able to transport at discharge. Medications: No barriers to taking/obtaining medications. PCP: Dr. Casey Escamilla      HD/PD: N/A    PLAN/COMMENTS: No needs identified at this time    SW/CM provided contact information for patient or family to call with any questions. SW/CM will follow and assist as needed.       Electronically signed by MARLENE Fernandez on 4/22/2019 at 12:49 PM

## 2019-04-23 LAB
ALBUMIN SERPL-MCNC: 2.1 G/DL (ref 3.4–5)
ANION GAP SERPL CALCULATED.3IONS-SCNC: 9 MMOL/L (ref 3–16)
ANTI-NUCLEAR ANTIBODY (ANA): NEGATIVE
APPEARANCE BAL (LAVAGE): ABNORMAL
BASOPHILS ABSOLUTE: 0 K/UL (ref 0–0.2)
BASOPHILS RELATIVE PERCENT: 0.3 %
BUN BLDV-MCNC: 11 MG/DL (ref 7–20)
CALCIUM SERPL-MCNC: 7.7 MG/DL (ref 8.3–10.6)
CHLORIDE BLD-SCNC: 108 MMOL/L (ref 99–110)
CLOT EVALUATION BAL: ABNORMAL
CO2: 23 MMOL/L (ref 21–32)
COLOR LAVAGE: COLORLESS
CREAT SERPL-MCNC: 0.8 MG/DL (ref 0.6–1.2)
EOSINOPHILS ABSOLUTE: 0.3 K/UL (ref 0–0.6)
EOSINOPHILS RELATIVE PERCENT: 3.7 %
EPITHELIAL CELLS FLUID: 57 %
GFR AFRICAN AMERICAN: >60
GFR NON-AFRICAN AMERICAN: >60
GLUCOSE BLD-MCNC: 82 MG/DL (ref 70–99)
HCT VFR BLD CALC: 27.9 % (ref 36–48)
HEMOGLOBIN: 9.2 G/DL (ref 12–16)
LYMPHOCYTES ABSOLUTE: 1.2 K/UL (ref 1–5.1)
LYMPHOCYTES RELATIVE PERCENT: 16.6 %
LYMPHOCYTES, BAL: 2 % (ref 5–10)
MACROPHAGES, BAL: 1 % (ref 90–95)
MAGNESIUM: 1.8 MG/DL (ref 1.8–2.4)
MCH RBC QN AUTO: 29.9 PG (ref 26–34)
MCHC RBC AUTO-ENTMCNC: 33 G/DL (ref 31–36)
MCV RBC AUTO: 90.6 FL (ref 80–100)
MONOCYTES ABSOLUTE: 1 K/UL (ref 0–1.3)
MONOCYTES RELATIVE PERCENT: 13.2 %
MONOCYTES, BAL: 1 %
NEUTROPHILS ABSOLUTE: 4.8 K/UL (ref 1.7–7.7)
NEUTROPHILS RELATIVE PERCENT: 66.2 %
NUMBER OF CELLS COUNTED BAL (LAVAGE): 200
ORGANISM: ABNORMAL
PATH REVIEW BAL (LAVAGE): YES
PDW BLD-RTO: 14.6 % (ref 12.4–15.4)
PHOSPHORUS: 2 MG/DL (ref 2.5–4.9)
PLATELET # BLD: 290 K/UL (ref 135–450)
PMV BLD AUTO: 7.5 FL (ref 5–10.5)
POTASSIUM SERPL-SCNC: 3.4 MMOL/L (ref 3.5–5.1)
RBC # BLD: 3.07 M/UL (ref 4–5.2)
RBC, BAL: 149 /CUMM
SEGMENTED NEUTROPHILS, BAL: 39 % (ref 5–10)
SODIUM BLD-SCNC: 140 MMOL/L (ref 136–145)
URINE CULTURE, ROUTINE: ABNORMAL
URINE CULTURE, ROUTINE: ABNORMAL
WBC # BLD: 7.3 K/UL (ref 4–11)
WBC/EPI CELLS BAL: 123 /CUMM

## 2019-04-23 PROCEDURE — 1200000000 HC SEMI PRIVATE

## 2019-04-23 PROCEDURE — 6360000002 HC RX W HCPCS: Performed by: INTERNAL MEDICINE

## 2019-04-23 PROCEDURE — 99152 MOD SED SAME PHYS/QHP 5/>YRS: CPT | Performed by: INTERNAL MEDICINE

## 2019-04-23 PROCEDURE — 2500000003 HC RX 250 WO HCPCS: Performed by: INTERNAL MEDICINE

## 2019-04-23 PROCEDURE — 7100000010 HC PHASE II RECOVERY - FIRST 15 MIN: Performed by: INTERNAL MEDICINE

## 2019-04-23 PROCEDURE — 0B9J8ZX DRAINAGE OF LEFT LOWER LUNG LOBE, VIA NATURAL OR ARTIFICIAL OPENING ENDOSCOPIC, DIAGNOSTIC: ICD-10-PCS | Performed by: INTERNAL MEDICINE

## 2019-04-23 PROCEDURE — 3609010800 HC BRONCHOSCOPY ALVEOLAR LAVAGE: Performed by: INTERNAL MEDICINE

## 2019-04-23 PROCEDURE — 87116 MYCOBACTERIA CULTURE: CPT

## 2019-04-23 PROCEDURE — 80069 RENAL FUNCTION PANEL: CPT

## 2019-04-23 PROCEDURE — 99232 SBSQ HOSP IP/OBS MODERATE 35: CPT | Performed by: INTERNAL MEDICINE

## 2019-04-23 PROCEDURE — 2580000003 HC RX 258: Performed by: INTERNAL MEDICINE

## 2019-04-23 PROCEDURE — 87070 CULTURE OTHR SPECIMN AEROBIC: CPT

## 2019-04-23 PROCEDURE — 7100000011 HC PHASE II RECOVERY - ADDTL 15 MIN: Performed by: INTERNAL MEDICINE

## 2019-04-23 PROCEDURE — 6370000000 HC RX 637 (ALT 250 FOR IP): Performed by: INTERNAL MEDICINE

## 2019-04-23 PROCEDURE — 85025 COMPLETE CBC W/AUTO DIFF WBC: CPT

## 2019-04-23 PROCEDURE — 87015 SPECIMEN INFECT AGNT CONCNTJ: CPT

## 2019-04-23 PROCEDURE — 88112 CYTOPATH CELL ENHANCE TECH: CPT

## 2019-04-23 PROCEDURE — 87205 SMEAR GRAM STAIN: CPT

## 2019-04-23 PROCEDURE — 88305 TISSUE EXAM BY PATHOLOGIST: CPT

## 2019-04-23 PROCEDURE — 87252 VIRUS INOCULATION TISSUE: CPT

## 2019-04-23 PROCEDURE — 83735 ASSAY OF MAGNESIUM: CPT

## 2019-04-23 PROCEDURE — 3609011500 HC BRONCHOSCOPY DIAGNOSTIC OR CELL WASH ONLY: Performed by: INTERNAL MEDICINE

## 2019-04-23 PROCEDURE — 89051 BODY FLUID CELL COUNT: CPT

## 2019-04-23 PROCEDURE — 31624 DX BRONCHOSCOPE/LAVAGE: CPT | Performed by: INTERNAL MEDICINE

## 2019-04-23 PROCEDURE — 2709999900 HC NON-CHARGEABLE SUPPLY: Performed by: INTERNAL MEDICINE

## 2019-04-23 PROCEDURE — 88312 SPECIAL STAINS GROUP 1: CPT

## 2019-04-23 PROCEDURE — 87206 SMEAR FLUORESCENT/ACID STAI: CPT

## 2019-04-23 PROCEDURE — 87102 FUNGUS ISOLATION CULTURE: CPT

## 2019-04-23 PROCEDURE — 36415 COLL VENOUS BLD VENIPUNCTURE: CPT

## 2019-04-23 RX ORDER — FENTANYL CITRATE 50 UG/ML
INJECTION, SOLUTION INTRAMUSCULAR; INTRAVENOUS
Status: COMPLETED | OUTPATIENT
Start: 2019-04-23 | End: 2019-04-23

## 2019-04-23 RX ORDER — LIDOCAINE HYDROCHLORIDE 40 MG/ML
INJECTION, SOLUTION RETROBULBAR; TOPICAL
Status: COMPLETED | OUTPATIENT
Start: 2019-04-23 | End: 2019-04-23

## 2019-04-23 RX ORDER — LIDOCAINE HYDROCHLORIDE 20 MG/ML
INJECTION, SOLUTION EPIDURAL; INFILTRATION; INTRACAUDAL; PERINEURAL
Status: COMPLETED | OUTPATIENT
Start: 2019-04-23 | End: 2019-04-23

## 2019-04-23 RX ORDER — MIDAZOLAM HYDROCHLORIDE 1 MG/ML
INJECTION INTRAMUSCULAR; INTRAVENOUS
Status: COMPLETED | OUTPATIENT
Start: 2019-04-23 | End: 2019-04-23

## 2019-04-23 RX ADMIN — ATORVASTATIN CALCIUM 40 MG: 40 TABLET, FILM COATED ORAL at 08:51

## 2019-04-23 RX ADMIN — ENOXAPARIN SODIUM 40 MG: 40 INJECTION SUBCUTANEOUS at 17:54

## 2019-04-23 RX ADMIN — SODIUM CHLORIDE: 9 INJECTION, SOLUTION INTRAVENOUS at 01:47

## 2019-04-23 RX ADMIN — CEFEPIME HYDROCHLORIDE 1 G: 1 INJECTION, POWDER, FOR SOLUTION INTRAMUSCULAR; INTRAVENOUS at 15:04

## 2019-04-23 RX ADMIN — MEDROXYPROGESTERONE ACETATE 2.5 MG: 2.5 TABLET ORAL at 08:51

## 2019-04-23 RX ADMIN — CEFEPIME HYDROCHLORIDE 1 G: 1 INJECTION, POWDER, FOR SOLUTION INTRAMUSCULAR; INTRAVENOUS at 01:47

## 2019-04-23 RX ADMIN — ESTRADIOL 0.5 MG: 1 TABLET ORAL at 15:03

## 2019-04-23 RX ADMIN — ATENOLOL 25 MG: 25 TABLET ORAL at 08:51

## 2019-04-23 ASSESSMENT — PAIN SCALES - GENERAL
PAINLEVEL_OUTOF10: 0

## 2019-04-23 ASSESSMENT — PAIN - FUNCTIONAL ASSESSMENT: PAIN_FUNCTIONAL_ASSESSMENT: 0-10

## 2019-04-23 NOTE — PROGRESS NOTES
- 36.0 g/dL    RDW 14.6 12.4 - 15.4 %    Platelets 373 566 - 304 K/uL    MPV 7.5 5.0 - 10.5 fL    Neutrophils % 66.2 %    Lymphocytes % 16.6 %    Monocytes % 13.2 %    Eosinophils % 3.7 %    Basophils % 0.3 %    Neutrophils # 4.8 1.7 - 7.7 K/uL    Lymphocytes # 1.2 1.0 - 5.1 K/uL    Monocytes # 1.0 0.0 - 1.3 K/uL    Eosinophils # 0.3 0.0 - 0.6 K/uL    Basophils # 0.0 0.0 - 0.2 K/uL   Renal Function Panel    Collection Time: 04/23/19  7:37 AM   Result Value Ref Range    Sodium 140 136 - 145 mmol/L    Potassium 3.4 (L) 3.5 - 5.1 mmol/L    Chloride 108 99 - 110 mmol/L    CO2 23 21 - 32 mmol/L    Anion Gap 9 3 - 16    Glucose 82 70 - 99 mg/dL    BUN 11 7 - 20 mg/dL    CREATININE 0.8 0.6 - 1.2 mg/dL    GFR Non-African American >60 >60    GFR African American >60 >60    Calcium 7.7 (L) 8.3 - 10.6 mg/dL    Phosphorus 2.0 (L) 2.5 - 4.9 mg/dL    Alb 2.1 (L) 3.4 - 5.0 g/dL   Magnesium    Collection Time: 04/23/19  7:37 AM   Result Value Ref Range    Magnesium 1.80 1.80 - 2.40 mg/dL       -----------------------------------------------------------------  Imaging  Xr Chest Standard (2 Vw)    Result Date: 4/15/2019  EXAMINATION: TWO VIEWS OF THE CHEST 4/15/2019 1:34 pm COMPARISON: 04/01/2019 HISTORY: ORDERING SYSTEM PROVIDED HISTORY: Pneumonia due to infectious organism, unspecified laterality, unspecified part of lung TECHNOLOGIST PROVIDED HISTORY: Reason for exam:->pneumonia Ordering Physician Provided Reason for Exam: follow up pneumonia Acuity: Acute Type of Exam: Initial Relevant Medical/Surgical History: former smoker cad borderline copd FINDINGS: Normal heart size and pulmonary vasculature. Aortic calcifications. Slight improvement of left basilar consolidation suggesting resolving pneumonia or atelectasis. Stable opacities in the right lateral upper lung likely scarring. No effusion. No pneumothorax.   Hyperinflated lungs with flattened diaphragms compatible with emphysematous changes     Slight improvement in left basilar consolidation suggesting resolving pneumonia or atelectasis. Continued follow-up advised to ensure complete resolution. Emphysematous changes. Xr Chest Standard (2 Vw)    Result Date: 4/1/2019  EXAMINATION: TWO VIEWS OF THE CHEST 4/1/2019 3:58 pm COMPARISON: 03/30/2017 HISTORY: ORDERING SYSTEM PROVIDED HISTORY: COPD (chronic obstructive pulmonary disease) with acute bronchitis ([address] TECHNOLOGIST PROVIDED HISTORY: Reason for exam:->chest wall pain. Ordering Physician Provided Reason for Exam: pt has sob and pain in her rib cage for the last few days. no injury Acuity: Acute Type of Exam: Initial Relevant Medical/Surgical History: COPD FINDINGS: Two views were obtained of the chest.  Linear opacity within the right suprahilar region extending to the lateral pleural margin with nodular pleuroparenchymal thickening is similar to prior. A previously demonstrated nodular opacity that had been seen more inferior on the prior examination has nearly resolved with minimal residual opacity. New heterogeneous left perihilar opacity is seen as well as a nodular opacity at the left base. No pneumothorax. Biapical pleuroparenchymal thickening. Cardiac and mediastinal silhouettes are similar to prior. Calcification of aorta. Developing left perihilar and left basilar opacity as compared to prior. Correlate for signs or symptoms of pneumonia. Given the nodular density at the left base, follow-up to resolution is recommended. If this does not resolve, chest CT would be recommended for further assessment. Ct Chest Wo Contrast    Result Date: 4/20/2019  EXAMINATION: CT OF THE CHEST WITHOUT CONTRAST 4/20/2019 8:02 pm TECHNIQUE: CT of the chest was performed without the administration of intravenous contrast. Multiplanar reformatted images are provided for review.  Dose modulation, iterative reconstruction, and/or weight based adjustment of the mA/kV was utilized to reduce the radiation dose to as low as reasonably achievable. COMPARISON: High-resolution CT chest 07/17/2017 HISTORY: Fatigue, dx with pneumonia on 4/1. she has been on abx (z-pack, levofloxacin). no improvement. Acuity: Acute FINDINGS: Mediastinum: Cardiac structures and great vessels appear unremarkable with exception of calcific atherosclerotic disease. No pericardial effusion. Posterior mediastinal structures appear unremarkable. No mediastinal or hilar adenopathy. Lungs/pleura: Stable appearance of biapical scarring with new focal infiltrate posteromedial left upper lobe and anterolateral lingula left upper lobe, as well as patchy alveolar and infiltrative densities bilateral lower lungs, concerning for pneumonia. A few scattered tree-in-bud configured opacities lateral lower right lung are typical of infectious process, with similar appearing opacities lateral within the lingula left upper lobe. Sequela from smoking including emphysema predominates in the upper lungs. No inspissated secretions or endobronchial lesion evident. No pleural effusion or pneumothorax. Upper Abdomen: Unremarkable appearance. Soft Tissues/Bones: No acute superficial soft tissue or osseous structure abnormality evident. 1. Patchy infiltrates in the bilateral lungs as described typical pneumonia. Follow-up IV contrast-enhanced CT chest in 3-4 months is recommended to ensure clearing. 2. Acute pulmonary changes are on a background of sequela from smoking including emphysema. 3. Calcific atherosclerosis aorta and coronary arteries. RECOMMENDATIONS: IV contrast-enhanced CT chest in 3-4 months         Objective:   Vitals: /65   Pulse 76   Temp 98.3 °F (36.8 °C) (Oral)   Resp 18   Ht 5' 3\" (1.6 m)   Wt 157 lb 6.5 oz (71.4 kg)   SpO2 94%   BMI 27.88 kg/m²   General appearance: Laying in bed  Skin: Warm  HEENT: Head: Normocephalic, no lesions, without obvious abnormality.   Neck: no adenopathy, no carotid bruit, no JVD, supple, symmetrical, trachea midline and thyroid not enlarged, symmetric, no tenderness/mass/nodules  Lungs: diminished breath sounds bilaterally  Heart: regular rate and rhythm  Abdomen: soft, non-tender; bowel sounds normal; no masses,  no organomegaly  Extremities: extremities normal, atraumatic, no cyanosis or edema  Neurologic: Mental status: Alert, oriented, thought content appropriate      Assessment/Plan:   Patient Active Problem List:      Multifocal pneumonia: Likely bacterial.    -Bronch today   -Antibiotics    -Pulm following        Sepsis: Attributed to UTI and pneumonia. Met criteria with temperature and HR. -Antibiotics    -Culture         UTI(Urinary Tract Infection): POA. -Culture + staph epi   -Antibiotics         Collagenous colitis: Recently treated. -Completed Budesonide    -Outpatient GI follow-up        COPD (Chronic Obstructive Pulmonary Disease): No sign of exacerbation.   -Nebs   -PRN O2        CAD(Coronary Artery Disease): Denies chest pain.    -ASA   -Statin   -B-Blocker          Disposition:   1. TBD    Nadya Stoner MD, FACP  4/23/2019  9:54 AM  [phone]

## 2019-04-23 NOTE — PROGRESS NOTES
BAL   120 cc saine instilled with 10 cc return.       Wash 120 cc saline instilled with 15 cc return

## 2019-04-23 NOTE — PROCEDURES
mucosa  Left upper lobe including Lingula, anterior segment, and apico-posterior segment through the 4th generation bronchi: Normal mucosa  Left lower lobe basilar and superior segments uptill 4th generation bronchi: Normal mucosa    Specimens:    1. BAL of the Left lower lobe with 120 mL in and ~ 15 mL out  2. Wash of lungs      The Patient was taken to the Endoscopy Recovery area in satisfactory condition. Jon Nuñez    Type of sedation used:  Moderate Sedation  Physician/patient face-to-face sedation start time: 11:05    Physician/patient face-to-face sedation stop time: 11:23    Total moderate sedation time in minutes: 18 minutes  Patient was monitored continuously 1:1 throughout the entire procedure while sedation was administered

## 2019-04-23 NOTE — PROGRESS NOTES
Remains awake and talkative. Productive cough blood tinged sputum, but frequency of cough has diminished. No complaints. Respirations easy.

## 2019-04-23 NOTE — PROGRESS NOTES
[facility] Ad[facility] to Phase 2 care. Awake and alert, respirations easy and even. Oriented to room and surroundings. No complaints. Oxygen on 2 lpm per cannula.

## 2019-04-24 LAB
ALBUMIN SERPL-MCNC: 2.6 G/DL (ref 3.4–5)
ANCA IFA: NORMAL
ANION GAP SERPL CALCULATED.3IONS-SCNC: 9 MMOL/L (ref 3–16)
BASOPHILS ABSOLUTE: 0 K/UL (ref 0–0.2)
BASOPHILS RELATIVE PERCENT: 0.3 %
BUN BLDV-MCNC: 13 MG/DL (ref 7–20)
CALCIUM SERPL-MCNC: 7.8 MG/DL (ref 8.3–10.6)
CCP IGG ANTIBODIES: 2 UNITS (ref 0–19)
CHLORIDE BLD-SCNC: 107 MMOL/L (ref 99–110)
CO2: 23 MMOL/L (ref 21–32)
CREAT SERPL-MCNC: 0.7 MG/DL (ref 0.6–1.2)
CULTURE, RESPIRATORY: NORMAL
EOSINOPHILS ABSOLUTE: 0.2 K/UL (ref 0–0.6)
EOSINOPHILS RELATIVE PERCENT: 3.3 %
GFR AFRICAN AMERICAN: >60
GFR NON-AFRICAN AMERICAN: >60
GLUCOSE BLD-MCNC: 118 MG/DL (ref 70–99)
GRAM STAIN RESULT: NORMAL
HCT VFR BLD CALC: 29.5 % (ref 36–48)
HEMOGLOBIN: 9.7 G/DL (ref 12–16)
LYMPHOCYTES ABSOLUTE: 1.1 K/UL (ref 1–5.1)
LYMPHOCYTES RELATIVE PERCENT: 15 %
MAGNESIUM: 1.4 MG/DL (ref 1.8–2.4)
MCH RBC QN AUTO: 30 PG (ref 26–34)
MCHC RBC AUTO-ENTMCNC: 32.8 G/DL (ref 31–36)
MCV RBC AUTO: 91.6 FL (ref 80–100)
MONOCYTES ABSOLUTE: 1 K/UL (ref 0–1.3)
MONOCYTES RELATIVE PERCENT: 12.8 %
NEUTROPHILS ABSOLUTE: 5.2 K/UL (ref 1.7–7.7)
NEUTROPHILS RELATIVE PERCENT: 68.6 %
PDW BLD-RTO: 14.4 % (ref 12.4–15.4)
PHOSPHORUS: 1.9 MG/DL (ref 2.5–4.9)
PLATELET # BLD: 293 K/UL (ref 135–450)
PMV BLD AUTO: 7.2 FL (ref 5–10.5)
POTASSIUM SERPL-SCNC: 3.1 MMOL/L (ref 3.5–5.1)
RBC # BLD: 3.22 M/UL (ref 4–5.2)
SODIUM BLD-SCNC: 139 MMOL/L (ref 136–145)
WBC # BLD: 7.6 K/UL (ref 4–11)

## 2019-04-24 PROCEDURE — 6370000000 HC RX 637 (ALT 250 FOR IP): Performed by: INTERNAL MEDICINE

## 2019-04-24 PROCEDURE — 2580000003 HC RX 258: Performed by: INTERNAL MEDICINE

## 2019-04-24 PROCEDURE — 83735 ASSAY OF MAGNESIUM: CPT

## 2019-04-24 PROCEDURE — 6360000002 HC RX W HCPCS: Performed by: INTERNAL MEDICINE

## 2019-04-24 PROCEDURE — 80069 RENAL FUNCTION PANEL: CPT

## 2019-04-24 PROCEDURE — 36415 COLL VENOUS BLD VENIPUNCTURE: CPT

## 2019-04-24 PROCEDURE — 94640 AIRWAY INHALATION TREATMENT: CPT

## 2019-04-24 PROCEDURE — 99232 SBSQ HOSP IP/OBS MODERATE 35: CPT | Performed by: INTERNAL MEDICINE

## 2019-04-24 PROCEDURE — 85025 COMPLETE CBC W/AUTO DIFF WBC: CPT

## 2019-04-24 PROCEDURE — 1200000000 HC SEMI PRIVATE

## 2019-04-24 RX ORDER — PREDNISONE 20 MG/1
40 TABLET ORAL DAILY
Status: DISCONTINUED | OUTPATIENT
Start: 2019-04-24 | End: 2019-04-25 | Stop reason: HOSPADM

## 2019-04-24 RX ORDER — BENZONATATE 100 MG/1
100 CAPSULE ORAL 3 TIMES DAILY PRN
Status: DISCONTINUED | OUTPATIENT
Start: 2019-04-24 | End: 2019-04-25 | Stop reason: HOSPADM

## 2019-04-24 RX ORDER — IPRATROPIUM BROMIDE AND ALBUTEROL SULFATE 2.5; .5 MG/3ML; MG/3ML
1 SOLUTION RESPIRATORY (INHALATION)
Status: DISCONTINUED | OUTPATIENT
Start: 2019-04-25 | End: 2019-04-25

## 2019-04-24 RX ORDER — ALBUTEROL SULFATE 2.5 MG/3ML
2.5 SOLUTION RESPIRATORY (INHALATION)
Status: DISCONTINUED | OUTPATIENT
Start: 2019-04-24 | End: 2019-04-25 | Stop reason: HOSPADM

## 2019-04-24 RX ADMIN — CEFEPIME HYDROCHLORIDE 1 G: 1 INJECTION, POWDER, FOR SOLUTION INTRAMUSCULAR; INTRAVENOUS at 14:01

## 2019-04-24 RX ADMIN — ENOXAPARIN SODIUM 40 MG: 40 INJECTION SUBCUTANEOUS at 08:49

## 2019-04-24 RX ADMIN — ESTRADIOL 0.5 MG: 1 TABLET ORAL at 08:49

## 2019-04-24 RX ADMIN — CEFEPIME HYDROCHLORIDE 1 G: 1 INJECTION, POWDER, FOR SOLUTION INTRAMUSCULAR; INTRAVENOUS at 02:10

## 2019-04-24 RX ADMIN — MAGNESIUM SULFATE HEPTAHYDRATE 1 G: 1 INJECTION, SOLUTION INTRAVENOUS at 08:58

## 2019-04-24 RX ADMIN — ATORVASTATIN CALCIUM 40 MG: 40 TABLET, FILM COATED ORAL at 08:49

## 2019-04-24 RX ADMIN — MAGNESIUM SULFATE HEPTAHYDRATE 1 G: 1 INJECTION, SOLUTION INTRAVENOUS at 10:13

## 2019-04-24 RX ADMIN — POTASSIUM CHLORIDE 40 MEQ: 20 TABLET, EXTENDED RELEASE ORAL at 08:58

## 2019-04-24 RX ADMIN — ALBUTEROL SULFATE 2 PUFF: 90 AEROSOL, METERED RESPIRATORY (INHALATION) at 06:11

## 2019-04-24 RX ADMIN — ATENOLOL 25 MG: 25 TABLET ORAL at 08:48

## 2019-04-24 RX ADMIN — PREDNISONE 40 MG: 20 TABLET ORAL at 10:12

## 2019-04-24 RX ADMIN — SODIUM CHLORIDE: 9 INJECTION, SOLUTION INTRAVENOUS at 21:29

## 2019-04-24 RX ADMIN — ASPIRIN 325 MG ORAL TABLET 325 MG: 325 PILL ORAL at 08:48

## 2019-04-24 RX ADMIN — SODIUM CHLORIDE: 9 INJECTION, SOLUTION INTRAVENOUS at 06:04

## 2019-04-24 ASSESSMENT — PAIN SCALES - GENERAL: PAINLEVEL_OUTOF10: 0

## 2019-04-24 NOTE — PROGRESS NOTES
Non-distended. +BS  Skin: Warm, dry, w/o erythema. Lymph: No cervical or supraclavicular LAD. M/S: No cyanosis. No clubbing. Neuro:  CN 2-12 tested, no focal neurologic deficit. Moves all extremities  Psych: Awake and alert, Oriented x 3. Judgement and insight appropriate. Mood stable. Medications:    Scheduled Meds:   aspirin  325 mg Oral Daily    atenolol  25 mg Oral Daily    atorvastatin  40 mg Oral Daily    estradiol  0.5 mg Oral Daily    medroxyPROGESTERone  2.5 mg Oral Every Other Day    sodium chloride flush  10 mL Intravenous 2 times per day    enoxaparin  40 mg Subcutaneous Daily    cefepime  1 g Intravenous Q12H       Continuous Infusions:   sodium chloride 75 mL/hr at 04/24/19 0604       PRN Meds:  albuterol sulfate HFA, sodium chloride flush, ibuprofen, magnesium sulfate, potassium chloride **OR** potassium alternative oral replacement **OR** potassium chloride, iopamidol    Labs reviewed:  CBC:   Recent Labs     04/22/19  0858 04/23/19  0737 04/24/19  0731   WBC 9.8 7.3 7.6   HGB 10.3* 9.2* 9.7*   HCT 32.0* 27.9* 29.5*   MCV 91.8 90.6 91.6    290 293     BMP:   Recent Labs     04/22/19  0858 04/23/19  0737 04/24/19  0731    140 139   K 3.1* 3.4* 3.1*    108 107   CO2 22 23 23   PHOS 2.3* 2.0* 1.9*   BUN 18 11 13   CREATININE 1.0 0.8 0.7     LIVER PROFILE:   No results for input(s): AST, ALT, LIPASE, BILIDIR, BILITOT, ALKPHOS in the last 72 hours. Invalid input(s): AMYLASE,  ALB  PT/INR: No results for input(s): PROTIME, INR in the last 72 hours. APTT: No results for input(s): APTT in the last 72 hours. UA:  No results for input(s): NITRITE, COLORU, PHUR, LABCAST, WBCUA, RBCUA, MUCUS, TRICHOMONAS, YEAST, BACTERIA, CLARITYU, SPECGRAV, LEUKOCYTESUR, UROBILINOGEN, BILIRUBINUR, BLOODU, GLUCOSEU, AMORPHOUS in the last 72 hours. Invalid input(s): Valarie Pallas  No results for input(s): PH, PCO2, PO2 in the last 72 hours.       Films:  Radiology Review:  Pertinent images / reports were reviewed as a part of this visit. CT Chest w/ contrast: No results found for this or any previous visit. CT Chest w/o contrast:   Results for orders placed during the hospital encounter of 04/20/19   CT CHEST WO CONTRAST    Narrative EXAMINATION:  CT OF THE CHEST WITHOUT CONTRAST 4/20/2019 8:02 pm    TECHNIQUE:  CT of the chest was performed without the administration of intravenous  contrast. Multiplanar reformatted images are provided for review. Dose  modulation, iterative reconstruction, and/or weight based adjustment of the  mA/kV was utilized to reduce the radiation dose to as low as reasonably  achievable. COMPARISON:  High-resolution CT chest 07/17/2017    HISTORY:  Fatigue, dx with pneumonia on 4/1. she has been on abx (z-pack,  levofloxacin). no improvement. Acuity: Acute    FINDINGS:  Mediastinum: Cardiac structures and great vessels appear unremarkable with  exception of calcific atherosclerotic disease. No pericardial effusion. Posterior mediastinal structures appear unremarkable. No mediastinal or  hilar adenopathy. Lungs/pleura: Stable appearance of biapical scarring with new focal  infiltrate posteromedial left upper lobe and anterolateral lingula left upper  lobe, as well as patchy alveolar and infiltrative densities bilateral lower  lungs, concerning for pneumonia. A few scattered tree-in-bud configured  opacities lateral lower right lung are typical of infectious process, with  similar appearing opacities lateral within the lingula left upper lobe. Sequela from smoking including emphysema predominates in the upper lungs. No  inspissated secretions or endobronchial lesion evident. No pleural effusion  or pneumothorax. Upper Abdomen: Unremarkable appearance. Soft Tissues/Bones: No acute superficial soft tissue or osseous structure  abnormality evident. Impression 1.  Patchy infiltrates in the bilateral lungs as described typical

## 2019-04-24 NOTE — PROGRESS NOTES
Smear    Collection Time: 04/23/19 12:42 PM   Result Value Ref Range    AFB Smear No AFB observed by Fluorescent stain    CBC Auto Differential    Collection Time: 04/24/19  7:31 AM   Result Value Ref Range    WBC 7.6 4.0 - 11.0 K/uL    RBC 3.22 (L) 4.00 - 5.20 M/uL    Hemoglobin 9.7 (L) 12.0 - 16.0 g/dL    Hematocrit 29.5 (L) 36.0 - 48.0 %    MCV 91.6 80.0 - 100.0 fL    MCH 30.0 26.0 - 34.0 pg    MCHC 32.8 31.0 - 36.0 g/dL    RDW 14.4 12.4 - 15.4 %    Platelets 665 581 - 808 K/uL    MPV 7.2 5.0 - 10.5 fL    Neutrophils % 68.6 %    Lymphocytes % 15.0 %    Monocytes % 12.8 %    Eosinophils % 3.3 %    Basophils % 0.3 %    Neutrophils # 5.2 1.7 - 7.7 K/uL    Lymphocytes # 1.1 1.0 - 5.1 K/uL    Monocytes # 1.0 0.0 - 1.3 K/uL    Eosinophils # 0.2 0.0 - 0.6 K/uL    Basophils # 0.0 0.0 - 0.2 K/uL   Renal Function Panel    Collection Time: 04/24/19  7:31 AM   Result Value Ref Range    Sodium 139 136 - 145 mmol/L    Potassium 3.1 (L) 3.5 - 5.1 mmol/L    Chloride 107 99 - 110 mmol/L    CO2 23 21 - 32 mmol/L    Anion Gap 9 3 - 16    Glucose 118 (H) 70 - 99 mg/dL    BUN 13 7 - 20 mg/dL    CREATININE 0.7 0.6 - 1.2 mg/dL    GFR Non-African American >60 >60    GFR African American >60 >60    Calcium 7.8 (L) 8.3 - 10.6 mg/dL    Phosphorus 1.9 (L) 2.5 - 4.9 mg/dL    Alb 2.6 (L) 3.4 - 5.0 g/dL   Magnesium    Collection Time: 04/24/19  7:31 AM   Result Value Ref Range    Magnesium 1.40 (L) 1.80 - 2.40 mg/dL       -----------------------------------------------------------------  Imaging  Xr Chest Standard (2 Vw)    Result Date: 4/15/2019  EXAMINATION: TWO VIEWS OF THE CHEST 4/15/2019 1:34 pm COMPARISON: 04/01/2019 HISTORY: ORDERING SYSTEM PROVIDED HISTORY: Pneumonia due to infectious organism, unspecified laterality, unspecified part of lung TECHNOLOGIST PROVIDED HISTORY: Reason for exam:->pneumonia Ordering Physician Provided Reason for Exam: follow up pneumonia Acuity: Acute Type of Exam: Initial Relevant Medical/Surgical History: assessment. Ct Chest Wo Contrast    Result Date: 4/20/2019  EXAMINATION: CT OF THE CHEST WITHOUT CONTRAST 4/20/2019 8:02 pm TECHNIQUE: CT of the chest was performed without the administration of intravenous contrast. Multiplanar reformatted images are provided for review. Dose modulation, iterative reconstruction, and/or weight based adjustment of the mA/kV was utilized to reduce the radiation dose to as low as reasonably achievable. COMPARISON: High-resolution CT chest 07/17/2017 HISTORY: Fatigue, dx with pneumonia on 4/1. she has been on abx (z-pack, levofloxacin). no improvement. Acuity: Acute FINDINGS: Mediastinum: Cardiac structures and great vessels appear unremarkable with exception of calcific atherosclerotic disease. No pericardial effusion. Posterior mediastinal structures appear unremarkable. No mediastinal or hilar adenopathy. Lungs/pleura: Stable appearance of biapical scarring with new focal infiltrate posteromedial left upper lobe and anterolateral lingula left upper lobe, as well as patchy alveolar and infiltrative densities bilateral lower lungs, concerning for pneumonia. A few scattered tree-in-bud configured opacities lateral lower right lung are typical of infectious process, with similar appearing opacities lateral within the lingula left upper lobe. Sequela from smoking including emphysema predominates in the upper lungs. No inspissated secretions or endobronchial lesion evident. No pleural effusion or pneumothorax. Upper Abdomen: Unremarkable appearance. Soft Tissues/Bones: No acute superficial soft tissue or osseous structure abnormality evident. 1. Patchy infiltrates in the bilateral lungs as described typical pneumonia. Follow-up IV contrast-enhanced CT chest in 3-4 months is recommended to ensure clearing. 2. Acute pulmonary changes are on a background of sequela from smoking including emphysema. 3. Calcific atherosclerosis aorta and coronary arteries.  RECOMMENDATIONS: IV

## 2019-04-24 NOTE — PROGRESS NOTES
Patient alert and oriented x4, VSS, sitting up in bed. Pt denies n/v, diarrhea, SOB, pain. States some SOB with exertion. Pt states no needs at this time. Bed in lowest position, Pt UAL and tolerating well. Bed in lowest position,non-slip socks on,  call light within reach. Will continue to monitor pt needs.

## 2019-04-24 NOTE — PLAN OF CARE
Problem: Falls - Risk of:  Goal: Will remain free from falls  Description  Will remain free from falls  4/24/2019 1451 by Silvino Bruce RN  Outcome: Ongoing  4/24/2019 0519 by Kelsey Joshua RN  Outcome: Ongoing  Note:   Patient free from falls this shift. Fall precautions in place at all times. Bed in lowest position with two side rails up and wheels locked. Call light within reach. Patient able and agreeable to contact for safety appropriately. Goal: Absence of physical injury  Description  Absence of physical injury  4/24/2019 1451 by Silvino Bruce RN  Outcome: Ongoing  4/24/2019 0519 by Kelsey Joshua RN  Outcome: Ongoing     Problem: Gas Exchange - Impaired:  Goal: Levels of oxygenation will improve  Description  Levels of oxygenation will improve  4/24/2019 1451 by Silvino Bruce RN  Outcome: Ongoing  4/24/2019 0519 by Kelsey Joshua RN  Outcome: Ongoing     Problem: Infection:  Goal: Will remain free from infection  Description  Will remain free from infection  4/24/2019 1451 by Silvino Bruce RN  Outcome: Ongoing  4/24/2019 0519 by Kelsey Joshua RN  Outcome: Ongoing     Problem: Safety:  Goal: Free from accidental physical injury  Description  Free from accidental physical injury  4/24/2019 1451 by Silvino Bruce RN  Outcome: Ongoing  4/24/2019 0519 by Kelsey Joshua RN  Outcome: Ongoing  Goal: Free from intentional harm  Description  Free from intentional harm  4/24/2019 1451 by Silvino Bruce RN  Outcome: Ongoing  4/24/2019 0519 by Kelsey Joshua RN  Outcome: Ongoing     Problem: Daily Care:  Goal: Daily care needs are met  Description  Daily care needs are met  4/24/2019 1451 by Silvino Bruce RN  Outcome: Ongoing  4/24/2019 0519 by Kelsey Joshua RN  Outcome: Ongoing  Note:   In collaboration with the healthcare team, the patient's daily care needs are meet. Patient is encouraged to perform tasks independently per ability.        Problem: Pain:  Goal: Patient's pain/discomfort is manageable  Description  Patient's pain/discomfort is manageable  4/24/2019 1451 by Opal Ryan RN  Outcome: Ongoing  4/24/2019 0519 by Viviana Wilks RN  Outcome: Ongoing  Note:   Pt able to express presence/absence of pain and rate pain appropriately using numerical scale. Pain/discomfort being managed with PRN analgesics per MD orders (see MAR). Pain assessed every shift and after interventions.        Problem: Skin Integrity:  Goal: Skin integrity will stabilize  Description  Skin integrity will stabilize  4/24/2019 1451 by Opal Ryan RN  Outcome: Ongoing  4/24/2019 0519 by Viviana Wilks RN  Outcome: Ongoing     Problem: Discharge Planning:  Goal: Patients continuum of care needs are met  Description  Patients continuum of care needs are met  4/24/2019 1451 by Opal Ryan RN  Outcome: Ongoing  4/24/2019 0519 by Viviana Wilks RN  Outcome: Ongoing

## 2019-04-25 VITALS
BODY MASS INDEX: 29.45 KG/M2 | RESPIRATION RATE: 18 BRPM | OXYGEN SATURATION: 92 % | TEMPERATURE: 97.9 F | HEIGHT: 63 IN | SYSTOLIC BLOOD PRESSURE: 113 MMHG | WEIGHT: 166.23 LBS | HEART RATE: 84 BPM | DIASTOLIC BLOOD PRESSURE: 62 MMHG

## 2019-04-25 LAB
ALBUMIN SERPL-MCNC: 2.9 G/DL (ref 3.4–5)
ANION GAP SERPL CALCULATED.3IONS-SCNC: 11 MMOL/L (ref 3–16)
ASPERGILLUS GALACTO AG: NEGATIVE
ASPERGILLUS GALACTO INDEX: 0.07
BASOPHILS ABSOLUTE: 0.1 K/UL (ref 0–0.2)
BASOPHILS RELATIVE PERCENT: 0.8 %
BLOOD CULTURE, ROUTINE: NORMAL
BUN BLDV-MCNC: 15 MG/DL (ref 7–20)
CALCIUM SERPL-MCNC: 8.2 MG/DL (ref 8.3–10.6)
CHLORIDE BLD-SCNC: 106 MMOL/L (ref 99–110)
CO2: 25 MMOL/L (ref 21–32)
CREAT SERPL-MCNC: 0.8 MG/DL (ref 0.6–1.2)
CULTURE, BLOOD 2: NORMAL
CULTURE, RESPIRATORY: NORMAL
CULTURE, RESPIRATORY: NORMAL
EOSINOPHILS ABSOLUTE: 0 K/UL (ref 0–0.6)
EOSINOPHILS RELATIVE PERCENT: 0.3 %
GFR AFRICAN AMERICAN: >60
GFR NON-AFRICAN AMERICAN: >60
GLUCOSE BLD-MCNC: 106 MG/DL (ref 70–99)
GRAM STAIN RESULT: NORMAL
GRAM STAIN RESULT: NORMAL
HCT VFR BLD CALC: 28.8 % (ref 36–48)
HEMOGLOBIN: 9.5 G/DL (ref 12–16)
LYMPHOCYTES ABSOLUTE: 1.8 K/UL (ref 1–5.1)
LYMPHOCYTES RELATIVE PERCENT: 19.8 %
MAGNESIUM: 1.8 MG/DL (ref 1.8–2.4)
MCH RBC QN AUTO: 30 PG (ref 26–34)
MCHC RBC AUTO-ENTMCNC: 32.9 G/DL (ref 31–36)
MCV RBC AUTO: 91.3 FL (ref 80–100)
MONOCYTES ABSOLUTE: 0.6 K/UL (ref 0–1.3)
MONOCYTES RELATIVE PERCENT: 6.9 %
NEUTROPHILS ABSOLUTE: 6.6 K/UL (ref 1.7–7.7)
NEUTROPHILS RELATIVE PERCENT: 72.2 %
PDW BLD-RTO: 14.5 % (ref 12.4–15.4)
PHOSPHORUS: 1.9 MG/DL (ref 2.5–4.9)
PLATELET # BLD: 335 K/UL (ref 135–450)
PMV BLD AUTO: 7.2 FL (ref 5–10.5)
POTASSIUM SERPL-SCNC: 4.1 MMOL/L (ref 3.5–5.1)
RBC # BLD: 3.15 M/UL (ref 4–5.2)
SODIUM BLD-SCNC: 142 MMOL/L (ref 136–145)
WBC # BLD: 9.1 K/UL (ref 4–11)

## 2019-04-25 PROCEDURE — 85025 COMPLETE CBC W/AUTO DIFF WBC: CPT

## 2019-04-25 PROCEDURE — 2580000003 HC RX 258: Performed by: INTERNAL MEDICINE

## 2019-04-25 PROCEDURE — 94640 AIRWAY INHALATION TREATMENT: CPT

## 2019-04-25 PROCEDURE — 83735 ASSAY OF MAGNESIUM: CPT

## 2019-04-25 PROCEDURE — 94760 N-INVAS EAR/PLS OXIMETRY 1: CPT

## 2019-04-25 PROCEDURE — 6360000002 HC RX W HCPCS: Performed by: INTERNAL MEDICINE

## 2019-04-25 PROCEDURE — 36415 COLL VENOUS BLD VENIPUNCTURE: CPT

## 2019-04-25 PROCEDURE — 6370000000 HC RX 637 (ALT 250 FOR IP): Performed by: PHYSICIAN ASSISTANT

## 2019-04-25 PROCEDURE — 6370000000 HC RX 637 (ALT 250 FOR IP): Performed by: INTERNAL MEDICINE

## 2019-04-25 PROCEDURE — 80069 RENAL FUNCTION PANEL: CPT

## 2019-04-25 RX ORDER — IPRATROPIUM BROMIDE AND ALBUTEROL SULFATE 2.5; .5 MG/3ML; MG/3ML
1 SOLUTION RESPIRATORY (INHALATION)
Status: DISCONTINUED | OUTPATIENT
Start: 2019-04-25 | End: 2019-04-25 | Stop reason: HOSPADM

## 2019-04-25 RX ORDER — ALBUTEROL SULFATE 2.5 MG/3ML
SOLUTION RESPIRATORY (INHALATION)
Status: DISPENSED
Start: 2019-04-25 | End: 2019-04-25

## 2019-04-25 RX ORDER — DOXYCYCLINE HYCLATE 100 MG
100 TABLET ORAL 2 TIMES DAILY
Qty: 10 TABLET | Refills: 0 | Status: SHIPPED | OUTPATIENT
Start: 2019-04-25 | End: 2019-04-30

## 2019-04-25 RX ORDER — AMOXICILLIN AND CLAVULANATE POTASSIUM 875; 125 MG/1; MG/1
1 TABLET, FILM COATED ORAL 2 TIMES DAILY
Qty: 10 TABLET | Refills: 0 | Status: SHIPPED | OUTPATIENT
Start: 2019-04-25 | End: 2019-04-25 | Stop reason: HOSPADM

## 2019-04-25 RX ORDER — PREDNISONE 20 MG/1
40 TABLET ORAL DAILY
Qty: 20 TABLET | Refills: 0 | Status: SHIPPED | OUTPATIENT
Start: 2019-04-26 | End: 2019-05-06

## 2019-04-25 RX ADMIN — ATORVASTATIN CALCIUM 40 MG: 40 TABLET, FILM COATED ORAL at 09:21

## 2019-04-25 RX ADMIN — IPRATROPIUM BROMIDE AND ALBUTEROL SULFATE 1 AMPULE: .5; 3 SOLUTION RESPIRATORY (INHALATION) at 00:46

## 2019-04-25 RX ADMIN — ENOXAPARIN SODIUM 40 MG: 40 INJECTION SUBCUTANEOUS at 09:22

## 2019-04-25 RX ADMIN — CEFEPIME HYDROCHLORIDE 1 G: 1 INJECTION, POWDER, FOR SOLUTION INTRAMUSCULAR; INTRAVENOUS at 02:17

## 2019-04-25 RX ADMIN — IPRATROPIUM BROMIDE AND ALBUTEROL SULFATE 1 AMPULE: .5; 3 SOLUTION RESPIRATORY (INHALATION) at 07:40

## 2019-04-25 RX ADMIN — ATENOLOL 25 MG: 25 TABLET ORAL at 09:22

## 2019-04-25 RX ADMIN — MEDROXYPROGESTERONE ACETATE 2.5 MG: 2.5 TABLET ORAL at 09:22

## 2019-04-25 RX ADMIN — PREDNISONE 40 MG: 20 TABLET ORAL at 09:22

## 2019-04-25 RX ADMIN — IPRATROPIUM BROMIDE AND ALBUTEROL SULFATE 1 AMPULE: .5; 3 SOLUTION RESPIRATORY (INHALATION) at 11:59

## 2019-04-25 RX ADMIN — ESTRADIOL 0.5 MG: 1 TABLET ORAL at 09:22

## 2019-04-25 RX ADMIN — ASPIRIN 325 MG ORAL TABLET 325 MG: 325 PILL ORAL at 09:21

## 2019-04-25 ASSESSMENT — PAIN SCALES - GENERAL: PAINLEVEL_OUTOF10: 0

## 2019-04-25 NOTE — DISCHARGE INSTR - DIET

## 2019-04-25 NOTE — DISCHARGE SUMMARY
Hospital Medicine  Discharge Summary    Lexa Altamirano       :  1945              MRN:  [id number]    Admit date:  2019    Discharge date:  2019    Admitting Physician:  Irish Desai MD  Discharge Physician: Yulia Rico MD      Discharge Diagnoses:    Patient Active Problem List   Diagnosis    IBS (irritable bowel syndrome)    CAD (coronary artery disease)    Gout, arthritis    IBS (irritable bowel syndrome)    CAD (coronary artery disease)    Bunion, right foot    Hammertoe    HTN (hypertension)    Hyperlipidemia    Obstructive chronic bronchitis with acute bronchitis ([address]    Sepsis [address] [address] [facility]    Collagenous colitis    History of pneumonia, recurrent       Admission Condition:  fair    Discharged Condition:  stable    The patient was seen and examined on day of discharge and this discharge summary is in conjunction with any daily progress note from day of discharge. History of Present Illness: 75 y.o. female who presented [facility] [place] with a one-month history of shortness of breath and cough. Patient states that she has been on multiple rounds of antibiotics prescribed by her PCP for pneumonia. Patient states she's just been not getting any better. She has received Zithromax initially early in the month and then has been on a prolonged course of Levaquin. Patient states her cough is nonproductive. She admits to dyspnea. Denies fevers or chills. Denies weight loss. Patient does seem to think that much of her symptoms got worse after she returned from [facility] on a vacation where she stayed in a cabin. End of March. Patient has a history of smoking but quit many years ago. Denies leg swelling. Denies chest pain. Denies abdominal pain. Denies hemoptysis.   Denies unintentional weight loss           Hospital Course:    Patient was admitted to the hospital and then started on supportive care with improvement in symptoms and please see below for further information. Multifocal pneumonia: Likely bacterial.    -s/p Bronch with BAL on 4/23/19    -Antibiotics    -Pulm follow-up for bronch results        Sepsis: Improved. Attributed to UTI and pneumonia. Met criteria with temperature and HR. -Antibiotics         UTI(Urinary Tract Infection): POA. -Culture + staph epi   -Antibiotics         Collagenous colitis: Recently treated. -Completed Budesonide    -Outpatient GI follow-up        COPD (Chronic Obstructive Pulmonary Disease): No sign of exacerbation.   -Nebs   -Steroids due to wheezing         CAD(Coronary Artery Disease): Denies chest pain. -ASA   -Statin   -B-Blocker        Discharge Physical Exam:  Vitals: /62   Pulse 84   Temp 97.9 °F (36.6 °C) (Oral)   Resp 18   Ht 5' 3\" (1.6 m)   Wt 166 lb 3.6 oz (75.4 kg)   SpO2 92%   BMI 29.45 kg/m²   General appearance: Laying in bed  Skin: Warm  HEENT: Head: Normocephalic, no lesions, without obvious abnormality. Neck: no adenopathy, no carotid bruit, no JVD, supple, symmetrical, trachea midline and thyroid not enlarged, symmetric, no tenderness/mass/nodules  Lungs: diminished breath sounds bilaterally  Heart: regular rate and rhythm  Abdomen: soft, non-tender; bowel sounds normal; no masses,  no organomegaly  Extremities: extremities normal, atraumatic, no cyanosis or edema  Neurologic: Mental status: Alert, oriented, thought content appropriate        Labs: For convenience and continuity at follow-up the following most recent labs are provided:       All labs past 24 hours:   Recent Results (from the past 24 hour(s))   CBC Auto Differential    Collection Time: 04/25/19  8:03 AM   Result Value Ref Range    WBC 9.1 4.0 - 11.0 K/uL    RBC 3.15 (L) 4.00 - 5.20 M/uL    Hemoglobin 9.5 (L) 12.0 - 16.0 g/dL    Hematocrit 28.8 (L) 36.0 - 48.0 %    MCV 91.3 80.0 - 100.0 fL    MCH 30.0 26.0 - 34.0 pg    MCHC 32.9 31.0 - 36.0 g/dL    RDW 14.5 12.4 - 15.4 %    Platelets 134 362 - 730 K/uL    MPV 7.2 5.0 - 10.5 fL    Neutrophils % 72.2 %    Lymphocytes % 19.8 %    Monocytes % 6.9 %    Eosinophils % 0.3 %    Basophils % 0.8 %    Neutrophils # 6.6 1.7 - 7.7 K/uL    Lymphocytes # 1.8 1.0 - 5.1 K/uL    Monocytes # 0.6 0.0 - 1.3 K/uL    Eosinophils # 0.0 0.0 - 0.6 K/uL    Basophils # 0.1 0.0 - 0.2 K/uL   Renal Function Panel    Collection Time: 04/25/19  8:03 AM   Result Value Ref Range    Sodium 142 136 - 145 mmol/L    Potassium 4.1 3.5 - 5.1 mmol/L    Chloride 106 99 - 110 mmol/L    CO2 25 21 - 32 mmol/L    Anion Gap 11 3 - 16    Glucose 106 (H) 70 - 99 mg/dL    BUN 15 7 - 20 mg/dL    CREATININE 0.8 0.6 - 1.2 mg/dL    GFR Non-African American >60 >60    GFR African American >60 >60    Calcium 8.2 (L) 8.3 - 10.6 mg/dL    Phosphorus 1.9 (L) 2.5 - 4.9 mg/dL    Alb 2.9 (L) 3.4 - 5.0 g/dL   Magnesium    Collection Time: 04/25/19  8:03 AM   Result Value Ref Range    Magnesium 1.80 1.80 - 2.40 mg/dL         Discharge Medications:    See medication reconciliation under discharge instructions. [address] Medication Instructions ILZ:[id number]    Printed on:04/25/19 1228   Medication Information                      allopurinol (ZYLOPRIM) 300 MG tablet  TAKE 1 TABLET EVERY DAY             aspirin 325 MG tablet  Take 325 mg by mouth daily             atenolol (TENORMIN) 25 MG tablet  Take 25 mg by mouth daily. atorvastatin (LIPITOR) 40 MG tablet               budesonide (ENTOCORT EC) 3 MG extended release capsule  Take 3 mg by mouth 3 times daily             clobetasol (TEMOVATE) 0.05 % cream  Apply topically 2 times daily             doxycycline hyclate (VIBRA-TABS) 100 MG tablet  Take 1 tablet by mouth 2 times daily for 5 days             estradiol (ESTRACE) 0.5 MG tablet  Take 0.5 mg by mouth daily. Loratadine (CLARITIN PO)  Take 10 mg by mouth daily.              losartan (COZAAR) 50 MG tablet  Take 0.5 tablets by mouth daily             medroxyPROGESTERone (PROVERA) 2.5 MG Sequela from smoking including emphysema predominates in the upper lungs. No inspissated secretions or endobronchial lesion evident. No pleural effusion or pneumothorax. Upper Abdomen: Unremarkable appearance. Soft Tissues/Bones: No acute superficial soft tissue or osseous structure abnormality evident. 1. Patchy infiltrates in the bilateral lungs as described typical pneumonia. Follow-up IV contrast-enhanced CT chest in 3-4 months is recommended to ensure clearing. 2. Acute pulmonary changes are on a background of sequela from smoking including emphysema. 3. Calcific atherosclerosis aorta and coronary arteries. RECOMMENDATIONS: IV contrast-enhanced CT chest in 3-4 months       =============================      Operation: Flexible fiberoptic bronchoscopy, diagnostic / therapeutic     Estimated Blood Loss: Minimal     Complications: None     Indications and History:  The patient is a 75 y.o. female with unresolving pneumonia. The risks, benefits, complications, treatment options and expected outcomes were discussed with the patient. The possibilities of reaction to medication, pulmonary aspiration, perforation of a viscus(Pneumothorax), bleeding, respiratory failure and failure to diagnose a condition and creating a complication requiring transfusion or operation were discussed with the patient who freely signed the consent.       Description of Procedure: The patient was taken to the endoscopy suite, identified as [facility] and the procedure verified as Flexible Fiberoptic Bronchoscopy. A Time Out was held and the above information confirmed.      After the induction of topical nasopharyngeal anesthesia, the patient was positioned supine and the bronchoscope was passed through the Right nare . The vocal cords were visualized, and 1% lidocaine was topically placed onto the cords. The cords were normal. The scope was then passed into the trachea.   Additional 1% lidocaine was used topically within the airway. Careful inspection of the tracheal lumen was accomplished. The scope was sequentially passed into all airways.         Endobronchial findings:     Trachea: Normal mucosa  Eulalia: Normal mucosa  Right main bronchus: Normal mucosa  Right upper lobe bronchus including anterior, apical and posterior until the fourth generation bronchi: Normal mucosa  Right intermedius bronchus: Normal mucosa  Right middle lobe: Normal mucosa  Right lower lobe superior segment uptill 3rd generation bronchi: Normal mucosa  Left main bronchus: Normal mucosa  Left upper lobe including Lingula, anterior segment, and apico-posterior segment through the 4th generation bronchi: Normal mucosa  Left lower lobe basilar and superior segments uptill 4th generation bronchi: Normal mucosa     Specimens:     1. BAL of the Left lower lobe with 120 mL in and ~ 15 mL out  2. Wash of lungs        The Patient was taken to the Endoscopy Recovery area in satisfactory condition.              Treatments:   See above    Patient Instructions: Activity: activity as tolerated  Diet: DIET GENERAL;  Wound Care: none needed    Follow-up with PCP on first available appointment.           Disposition:   home    Time spent on Discharged > 30 minutes      Signed:  Michael Light MD, FACP   4/25/2019, 12:28 PM  [phone]

## 2019-04-25 NOTE — PLAN OF CARE
Problem: Falls - Risk of:  Goal: Will remain free from falls  Description  Will remain free from falls  4/25/2019 1049 by Tai Rodriguez  Outcome: Ongoing  4/25/2019 0616 by Gasper Abdi RN  Outcome: Ongoing  Note:   D: PT. Alert and oriented, calls for assist as needed, bed alarm on and call light in reach  A: encouraged to continue calling for assist as needed  R: without falls this shift  Goal: Absence of physical injury  Description  Absence of physical injury  Outcome: Ongoing     Problem: Gas Exchange - Impaired:  Goal: Levels of oxygenation will improve  Description  Levels of oxygenation will improve  Outcome: Ongoing     Problem: Infection:  Goal: Will remain free from infection  Description  Will remain free from infection  Outcome: Ongoing     Problem: Safety:  Goal: Free from accidental physical injury  Description  Free from accidental physical injury  Outcome: Ongoing  Goal: Free from intentional harm  Description  Free from intentional harm  Outcome: Ongoing     Problem: Daily Care:  Goal: Daily care needs are met  Description  Daily care needs are met  Outcome: Ongoing     Problem: Pain:  Goal: Patient's pain/discomfort is manageable  Description  Patient's pain/discomfort is manageable  Outcome: Ongoing     Problem: Skin Integrity:  Goal: Skin integrity will stabilize  Description  Skin integrity will stabilize  Outcome: Ongoing     Problem: Discharge Planning:  Goal: Patients continuum of care needs are met  Description  Patients continuum of care needs are met  Outcome: Ongoing

## 2019-04-26 ENCOUNTER — CARE COORDINATION (OUTPATIENT)
Dept: CASE MANAGEMENT | Age: 74
End: 2019-04-26

## 2019-04-26 DIAGNOSIS — K58.0 IRRITABLE BOWEL SYNDROME WITH DIARRHEA: Primary | Chronic | ICD-10-CM

## 2019-04-26 PROCEDURE — 1111F DSCHRG MED/CURRENT MED MERGE: CPT

## 2019-04-26 NOTE — CARE COORDINATION
[facility] Transitions Initial Follow Up Call    Call within 2 business days of discharge: Yes    Patient: Yomi Osorio Patient : 1945   MRN: [id number]  Reason for Admission: Multifocal Pneumonia  Discharge Date: 19 RARS: Readmission Risk Score: 14      Last Discharge [facility]       Complaint Diagnosis Description Type Department Provider    19 Fatigue; Fever Multifocal pneumonia . .. ED to Hosp-Admission (Discharged) (ADMITTED) Antonio Padilla MD; Karthik Jones MD           Spoke with: Rukhsana Jackson (patient)    Facility: [facility] [place]    Non-face-to-face services provided:  Scheduled appointment with PCP-2019  Obtained and reviewed discharge summary and/or continuity of care documents  Assessment and support for treatment adherence and medication management-daily med list reviewed & 1111f completed. Care Transitions 24 Hour Call    Do you have any ongoing symptoms?:  No  Do you have a copy of your discharge instructions?:  Yes  Do you have all of your prescriptions and are they filled?:  Yes  Have you been contacted by a [facility] Pharmacist?:  No  Have you scheduled your follow up appointment?:  No  Were you discharged with any Home Care or Post Acute Services:  No  Do you feel like you have everything you need to keep you well at home?:  Yes  Care Transitions Interventions         Follow Up: Spoke with Nirmala Lang. She states she is doing pretty good. Nirmala Lang states she received a copy of her discharge instructions and they were reviewed with her prior to her discharge. She reviewed her daily med list with writer ([id number]). Writer offered assistance in scheduling hospital follow up appt - patient accepted - appt scheduled for 2019. Nirmala Lang states she still has some SOB and wheezing - it is better than when she discharged She also states she has a productive cough. Nirmala Lang denies any fever. She is taking her antibiotic.  Nirmala Lang states she has used her inhaler 2 times since she got

## 2019-05-03 ENCOUNTER — OFFICE VISIT (OUTPATIENT)
Dept: INTERNAL MEDICINE CLINIC | Age: 74
End: 2019-05-03
Payer: MEDICARE

## 2019-05-03 VITALS
TEMPERATURE: 98.1 F | WEIGHT: 153.25 LBS | BODY MASS INDEX: 27.15 KG/M2 | HEART RATE: 75 BPM | SYSTOLIC BLOOD PRESSURE: 124 MMHG | DIASTOLIC BLOOD PRESSURE: 80 MMHG | OXYGEN SATURATION: 94 %

## 2019-05-03 DIAGNOSIS — K52.9 COLITIS: ICD-10-CM

## 2019-05-03 DIAGNOSIS — J21.9 ACUTE BRONCHIOLITIS DUE TO UNSPECIFIED ORGANISM: ICD-10-CM

## 2019-05-03 DIAGNOSIS — J44.0 COPD (CHRONIC OBSTRUCTIVE PULMONARY DISEASE) WITH ACUTE BRONCHITIS (HCC): Primary | ICD-10-CM

## 2019-05-03 DIAGNOSIS — J20.9 COPD (CHRONIC OBSTRUCTIVE PULMONARY DISEASE) WITH ACUTE BRONCHITIS (HCC): Primary | ICD-10-CM

## 2019-05-03 DIAGNOSIS — R93.89 ABNORMAL CT OF THE CHEST: ICD-10-CM

## 2019-05-03 PROCEDURE — 1036F TOBACCO NON-USER: CPT | Performed by: INTERNAL MEDICINE

## 2019-05-03 PROCEDURE — 4040F PNEUMOC VAC/ADMIN/RCVD: CPT | Performed by: INTERNAL MEDICINE

## 2019-05-03 PROCEDURE — 1090F PRES/ABSN URINE INCON ASSESS: CPT | Performed by: INTERNAL MEDICINE

## 2019-05-03 PROCEDURE — G8598 ASA/ANTIPLAT THER USED: HCPCS | Performed by: INTERNAL MEDICINE

## 2019-05-03 PROCEDURE — G8419 CALC BMI OUT NRM PARAM NOF/U: HCPCS | Performed by: INTERNAL MEDICINE

## 2019-05-03 PROCEDURE — G8926 SPIRO NO PERF OR DOC: HCPCS | Performed by: INTERNAL MEDICINE

## 2019-05-03 PROCEDURE — 1111F DSCHRG MED/CURRENT MED MERGE: CPT | Performed by: INTERNAL MEDICINE

## 2019-05-03 PROCEDURE — 99214 OFFICE O/P EST MOD 30 MIN: CPT | Performed by: INTERNAL MEDICINE

## 2019-05-03 PROCEDURE — 1123F ACP DISCUSS/DSCN MKR DOCD: CPT | Performed by: INTERNAL MEDICINE

## 2019-05-03 PROCEDURE — 3023F SPIROM DOC REV: CPT | Performed by: INTERNAL MEDICINE

## 2019-05-03 PROCEDURE — 3017F COLORECTAL CA SCREEN DOC REV: CPT | Performed by: INTERNAL MEDICINE

## 2019-05-03 PROCEDURE — G8399 PT W/DXA RESULTS DOCUMENT: HCPCS | Performed by: INTERNAL MEDICINE

## 2019-05-03 PROCEDURE — G8427 DOCREV CUR MEDS BY ELIG CLIN: HCPCS | Performed by: INTERNAL MEDICINE

## 2019-05-03 ASSESSMENT — COPD QUESTIONNAIRES: COPD: 1

## 2019-05-03 ASSESSMENT — ENCOUNTER SYMPTOMS
CHEST TIGHTNESS: 1
SHORTNESS OF BREATH: 1

## 2019-05-03 NOTE — PROGRESS NOTES
tablet, Take 1 tablet by mouth every 6 hours as needed, Disp: 50 tablet, Rfl: 3  medroxyPROGESTERone (PROVERA) 2.5 MG tablet, Take 2.5 mg by mouth every other day , Disp: , Rfl:   estradiol (ESTRACE) 0.5 MG tablet, Take 0.5 mg by mouth daily. , Disp: , Rfl:   atorvastatin (LIPITOR) 40 MG tablet, , Disp: , Rfl:   Loratadine (CLARITIN PO), Take 10 mg by mouth daily. , Disp: , Rfl:   atenolol (TENORMIN) 25 MG tablet, Take 25 mg by mouth daily. , Disp: , Rfl:     No current facility-administered medications for this visit. Allergies: Acetaminophen and Amoxicillin-pot clavulanate (amoxicillin-pot clavulanate)  Past Medical History:  No date: Allergic rhinitis  No date: Asthma  12/2013: Bunion, right foot  6/7/2010: CAD (coronary artery disease)  No date: CAD (coronary artery disease)  No date: Gout  12/12/2013: Hammertoe  No date: Hyperlipidemia  No date: Hypertension  6/7/2010: IBS (irritable bowel syndrome)  4/1/2019: Obstructive chronic bronchitis with acute bronchitis ([address]  2010: Pneumonia  No date: Psoriasis  Past Surgical History:  4/23/2019: BRONCHOSCOPY; N/A      Comment:  BRONCHOSCOPY WITH BRONCHOALVEOLAR LAVAGE performed by                Trung Stevens MD [facility] [place] [facility]  4/23/2019: [address] ONLY performed by                Trung Stevens MD at [facility] [facility] [place] [facility]  12/12/2013Alan Kiran; Right  2009: COLONOSCOPY      Comment:  neg.  3/20/2019: COLONOSCOPY; N/A      Comment:  COLONOSCOPY WITH BIOPSY performed by Tiffanie Dominguez MD               at [facility] [place] [facility] [place] [facility]  No date: [address]:  1998 2010: EYE SURGERY      Comment:  left  12/12/2013: HAMMER TOE SURGERY;  Right  Review of patient's family history indicates:  Problem: Cancer      Relation: Father          Age of Onset: (Not Specified)          Comment: GI CANCER  Problem: Crohn's Disease      Relation: Sister          Age of Onset: (Not Specified)    Social History    Tobacco Use      Smoking status: Former Smoker        Packs/day: 1.00        Years: 30.00        Pack years: 30        Types: Cigarettes        Quit date: 3/2/1999        Years since quittin.1      Smokeless tobacco: Never Used    Alcohol use: No      COPD   She complains of chest tightness and shortness of breath. This is a chronic problem. The current episode started more than 1 year ago. The problem has been unchanged. Associated symptoms include weight loss. Pertinent negatives include no fever. Her symptoms are aggravated by nothing. Her symptoms are alleviated by nothing. Review of Systems   Constitutional: Positive for weight loss. Negative for fever. Respiratory: Positive for shortness of breath. Objective:   Physical Exam   Constitutional: She is oriented to person, place, and time. She appears well-developed and well-nourished. No distress. HENT:   Head: Normocephalic and atraumatic. Right Ear: External ear normal.   Left Ear: External ear normal.   Nose: Nose normal.   Mouth/Throat: Oropharynx is clear and moist. No oropharyngeal exudate. Eyes: Pupils are equal, round, and reactive to light. Conjunctivae and EOM are normal. Right eye exhibits no discharge. Left eye exhibits no discharge. No scleral icterus. Neck: Normal range of motion. Neck supple. No JVD present. No tracheal deviation present. No thyromegaly present. Cardiovascular: Normal rate, regular rhythm, normal heart sounds and intact distal pulses. Exam reveals no gallop and no friction rub. No murmur heard. Pulmonary/Chest: Effort normal. No stridor. No respiratory distress. She has decreased breath sounds. She has no wheezes. She has no rales. She exhibits no tenderness. Abdominal: Soft. She exhibits no distension and no mass. There is tenderness. There is no rebound and no guarding. Genitourinary: Vagina normal. Rectal exam shows guaiac negative stool. No vaginal discharge found. Musculoskeletal: Normal range of motion. She exhibits no edema or tenderness. Lymphadenopathy:     She has no cervical adenopathy. Neurological: She is alert and oriented to person, place, and time. She has normal reflexes. She displays normal reflexes. No cranial nerve deficit. She exhibits normal muscle tone. Coordination normal.   Skin: Skin is warm and dry. No rash noted. She is not diaphoretic. No erythema. No pallor. Psychiatric: She has a normal mood and affect. Her behavior is normal. Judgment and thought content normal.       Assessment:      Encounter Diagnoses   Name Primary?  COPD (chronic obstructive pulmonary disease) with acute bronchitis (HCC) Yes    Abnormal CT of the chest     Acute bronchiolitis due to unspecified organism     Colitis            Plan:      Swathi Arreola was seen today for follow-up from hospital.    Diagnoses and all orders for this visit:    COPD (chronic obstructive pulmonary disease) with acute bronchitis (HCC)    Abnormal CT of the chest  -     CT Chest W Contrast; Future    Acute bronchiolitis due to unspecified organism    Colitis      Stable colitis with steroid treatment. Needs PFT when her sx is stable. Need follow up ct in 3 m with contrast dye.         Reyes Olivares MD

## 2019-05-03 NOTE — PATIENT INSTRUCTIONS
Please call your pharmacy if you need any refills of your medication(s). Please call our office at [phone] if you don't hear from us about your test results. Bring an accurate list of your medications with you at every appointment to ensure that we have the correct information.     Our office hours are: Monday - Friday 8:30 am- 5 pm    Phone lines turn on at 8:30 am

## 2019-05-06 LAB
FINAL REPORT: NORMAL
PRELIMINARY: NORMAL

## 2019-05-13 ENCOUNTER — OFFICE VISIT (OUTPATIENT)
Dept: INTERNAL MEDICINE CLINIC | Age: 74
End: 2019-05-13
Payer: MEDICARE

## 2019-05-13 VITALS
TEMPERATURE: 97.9 F | HEART RATE: 69 BPM | SYSTOLIC BLOOD PRESSURE: 120 MMHG | WEIGHT: 153 LBS | OXYGEN SATURATION: 93 % | DIASTOLIC BLOOD PRESSURE: 62 MMHG | BODY MASS INDEX: 27.1 KG/M2

## 2019-05-13 DIAGNOSIS — R50.9 FEVER, UNSPECIFIED FEVER CAUSE: Primary | ICD-10-CM

## 2019-05-13 DIAGNOSIS — F19.930 STEROID WITHDRAWAL SYNDROME WITHOUT COMPLICATION (HCC): ICD-10-CM

## 2019-05-13 DIAGNOSIS — K52.9 COLITIS: ICD-10-CM

## 2019-05-13 DIAGNOSIS — J44.0 COPD (CHRONIC OBSTRUCTIVE PULMONARY DISEASE) WITH ACUTE BRONCHITIS (HCC): ICD-10-CM

## 2019-05-13 DIAGNOSIS — J20.9 COPD (CHRONIC OBSTRUCTIVE PULMONARY DISEASE) WITH ACUTE BRONCHITIS (HCC): ICD-10-CM

## 2019-05-13 DIAGNOSIS — F19.939: ICD-10-CM

## 2019-05-13 LAB
BILIRUBIN, POC: NORMAL
BLOOD URINE, POC: NORMAL
CLARITY, POC: CLEAR
COLOR, POC: NORMAL
GLUCOSE URINE, POC: NORMAL
KETONES, POC: NORMAL
LEUKOCYTE EST, POC: NORMAL
NITRITE, POC: NORMAL
PH, POC: 5
PROTEIN, POC: NORMAL
SPECIFIC GRAVITY, POC: 1.02
UROBILINOGEN, POC: 0.2

## 2019-05-13 PROCEDURE — 1123F ACP DISCUSS/DSCN MKR DOCD: CPT | Performed by: INTERNAL MEDICINE

## 2019-05-13 PROCEDURE — G8926 SPIRO NO PERF OR DOC: HCPCS | Performed by: INTERNAL MEDICINE

## 2019-05-13 PROCEDURE — 1111F DSCHRG MED/CURRENT MED MERGE: CPT | Performed by: INTERNAL MEDICINE

## 2019-05-13 PROCEDURE — G8427 DOCREV CUR MEDS BY ELIG CLIN: HCPCS | Performed by: INTERNAL MEDICINE

## 2019-05-13 PROCEDURE — 81002 URINALYSIS NONAUTO W/O SCOPE: CPT | Performed by: INTERNAL MEDICINE

## 2019-05-13 PROCEDURE — 3023F SPIROM DOC REV: CPT | Performed by: INTERNAL MEDICINE

## 2019-05-13 PROCEDURE — 99214 OFFICE O/P EST MOD 30 MIN: CPT | Performed by: INTERNAL MEDICINE

## 2019-05-13 PROCEDURE — 3017F COLORECTAL CA SCREEN DOC REV: CPT | Performed by: INTERNAL MEDICINE

## 2019-05-13 PROCEDURE — G8399 PT W/DXA RESULTS DOCUMENT: HCPCS | Performed by: INTERNAL MEDICINE

## 2019-05-13 PROCEDURE — 1036F TOBACCO NON-USER: CPT | Performed by: INTERNAL MEDICINE

## 2019-05-13 PROCEDURE — G8419 CALC BMI OUT NRM PARAM NOF/U: HCPCS | Performed by: INTERNAL MEDICINE

## 2019-05-13 PROCEDURE — 1090F PRES/ABSN URINE INCON ASSESS: CPT | Performed by: INTERNAL MEDICINE

## 2019-05-13 PROCEDURE — 4040F PNEUMOC VAC/ADMIN/RCVD: CPT | Performed by: INTERNAL MEDICINE

## 2019-05-13 PROCEDURE — G8598 ASA/ANTIPLAT THER USED: HCPCS | Performed by: INTERNAL MEDICINE

## 2019-05-13 RX ORDER — BUDESONIDE 3 MG/1
3 CAPSULE, COATED PELLETS ORAL 3 TIMES DAILY
Qty: 90 CAPSULE | Refills: 3 | Status: SHIPPED | OUTPATIENT
Start: 2019-05-13 | End: 2019-07-02

## 2019-05-13 ASSESSMENT — ENCOUNTER SYMPTOMS
ABDOMINAL PAIN: 1
SHORTNESS OF BREATH: 0
WHEEZING: 0
COUGH: 0
SORE THROAT: 0
DIARRHEA: 0

## 2019-05-13 NOTE — PROGRESS NOTES
Subjective:      Patient ID: Yomi Osorio is a 75 y.o. female. Patient presents with:  Fever:  feeling bad, had some fatigue and dizziness at the store last week. maybe uti? Was discharged last month with DX of pneumonia and COPD. Was on steroid for Collagenous Colitis and COPD. But not any more now. Some Sx of abdominal discomfort. No cough and no dysuria. Fever    This is a new problem. The current episode started in the past 7 days. The problem occurs intermittently. The maximum temperature noted was 100 to 100.9 F. Associated symptoms include abdominal pain. Pertinent negatives include no coughing, diarrhea, headaches, sore throat, urinary pain or wheezing. She has tried nothing for the symptoms. The treatment provided no relief. Risk factors: recent sickness    Risk factors: no occupational exposure and no recent travel        Review of Systems   Constitutional: Positive for fatigue and fever. HENT: Negative for sore throat. Respiratory: Negative for cough, shortness of breath and wheezing. Gastrointestinal: Positive for abdominal pain. Negative for diarrhea. Genitourinary: Negative for dysuria. Musculoskeletal: Negative. Skin: Negative. Neurological: Negative for headaches. Objective:   Physical Exam   Constitutional: She is oriented to person, place, and time. She appears well-developed and well-nourished. No distress. HENT:   Head: Normocephalic and atraumatic. Right Ear: External ear normal.   Left Ear: External ear normal.   Nose: Nose normal.   Mouth/Throat: Oropharynx is clear and moist. No oropharyngeal exudate. Eyes: Pupils are equal, round, and reactive to light. Conjunctivae and EOM are normal. Right eye exhibits no discharge. Left eye exhibits no discharge. No scleral icterus. Neck: Normal range of motion. Neck supple. No JVD present. No tracheal deviation present. No thyromegaly present.    Cardiovascular: Normal rate, regular rhythm, normal heart sounds and intact distal pulses. Exam reveals no gallop and no friction rub. No murmur heard. Pulmonary/Chest: Effort normal. No stridor. No respiratory distress. She has decreased breath sounds. She has no wheezes. She has no rales. She exhibits no tenderness. Abdominal: Soft. She exhibits no distension and no mass. There is tenderness. There is no rebound and no guarding. Genitourinary: Vagina normal. Rectal exam shows guaiac negative stool. No vaginal discharge found. Musculoskeletal: Normal range of motion. She exhibits no edema or tenderness. Lymphadenopathy:     She has no cervical adenopathy. Neurological: She is alert and oriented to person, place, and time. She has normal reflexes. She displays normal reflexes. No cranial nerve deficit. She exhibits normal muscle tone. Coordination normal.   Skin: Skin is warm and dry. No rash noted. She is not diaphoretic. No erythema. No pallor. Psychiatric: She has a normal mood and affect. Her behavior is normal. Judgment and thought content normal.       Assessment:      Encounter Diagnoses   Name Primary?  Fever, unspecified fever cause Yes    Colitis     COPD (chronic obstructive pulmonary disease) with acute bronchitis (HCC)     Steroid withdrawal syndrome with complication [facility])            Plan:      Valerie Vanegas was seen today for fever. Diagnoses and all orders for this visit:    Fever, unspecified fever cause  -     POCT Urinalysis no Micro    Colitis  -     budesonide (ENTOCORT EC) 3 MG extended release capsule; Take 1 capsule by mouth 3 times daily    COPD (chronic obstructive pulmonary disease) with acute bronchitis (HCC)    Steroid withdrawal syndrome with complication [facility])      May resume her steroid for collagenous colitis.         Meche Magallon MD

## 2019-05-13 NOTE — PATIENT INSTRUCTIONS
Please call your pharmacy if you need any refills of your medication(s). Please call our office at [phone] if you don't hear from us about your test results. Bring an accurate list of your medications with you at every appointment to ensure that we have the correct information.     Our office hours are: Monday - Friday 8:30 am- 5 pm    Phone lines turn on at 8:30 am

## 2019-05-27 LAB
FUNGUS (MYCOLOGY) CULTURE: NORMAL
FUNGUS (MYCOLOGY) CULTURE: NORMAL
FUNGUS STAIN: NORMAL
FUNGUS STAIN: NORMAL

## 2019-05-28 ENCOUNTER — OFFICE VISIT (OUTPATIENT)
Dept: INTERNAL MEDICINE CLINIC | Age: 74
End: 2019-05-28
Payer: MEDICARE

## 2019-05-28 VITALS
TEMPERATURE: 97.7 F | BODY MASS INDEX: 27.1 KG/M2 | HEART RATE: 70 BPM | DIASTOLIC BLOOD PRESSURE: 62 MMHG | WEIGHT: 153 LBS | OXYGEN SATURATION: 90 % | SYSTOLIC BLOOD PRESSURE: 120 MMHG

## 2019-05-28 DIAGNOSIS — K52.9 COLITIS: ICD-10-CM

## 2019-05-28 DIAGNOSIS — R50.9 FEVER, UNSPECIFIED FEVER CAUSE: Primary | ICD-10-CM

## 2019-05-28 DIAGNOSIS — J44.0 OBSTRUCTIVE CHRONIC BRONCHITIS WITH ACUTE BRONCHITIS (HCC): ICD-10-CM

## 2019-05-28 DIAGNOSIS — D64.9 ANEMIA, UNSPECIFIED TYPE: ICD-10-CM

## 2019-05-28 LAB
HCT VFR BLD CALC: 34.5 % (ref 36–48)
HEMOGLOBIN: 11.6 G/DL (ref 12–16)
IMMATURE RETIC FRACT: 0.41 (ref 0.21–0.37)
IRON SATURATION: 18 % (ref 15–50)
IRON: 34 UG/DL (ref 37–145)
MCH RBC QN AUTO: 30 PG (ref 26–34)
MCHC RBC AUTO-ENTMCNC: 33.6 G/DL (ref 31–36)
MCV RBC AUTO: 89.4 FL (ref 80–100)
PDW BLD-RTO: 15 % (ref 12.4–15.4)
PLATELET # BLD: 496 K/UL (ref 135–450)
PMV BLD AUTO: 7.6 FL (ref 5–10.5)
RBC # BLD: 3.86 M/UL (ref 4–5.2)
RETICULOCYTE ABSOLUTE COUNT: 0.04 M/UL (ref 0.02–0.1)
RETICULOCYTE COUNT PCT: 1.08 % (ref 0.5–2.18)
TOTAL IRON BINDING CAPACITY: 189 UG/DL (ref 260–445)
WBC # BLD: 9.9 K/UL (ref 4–11)

## 2019-05-28 PROCEDURE — 4040F PNEUMOC VAC/ADMIN/RCVD: CPT | Performed by: INTERNAL MEDICINE

## 2019-05-28 PROCEDURE — G8427 DOCREV CUR MEDS BY ELIG CLIN: HCPCS | Performed by: INTERNAL MEDICINE

## 2019-05-28 PROCEDURE — 1090F PRES/ABSN URINE INCON ASSESS: CPT | Performed by: INTERNAL MEDICINE

## 2019-05-28 PROCEDURE — 99214 OFFICE O/P EST MOD 30 MIN: CPT | Performed by: INTERNAL MEDICINE

## 2019-05-28 PROCEDURE — G8926 SPIRO NO PERF OR DOC: HCPCS | Performed by: INTERNAL MEDICINE

## 2019-05-28 PROCEDURE — 3017F COLORECTAL CA SCREEN DOC REV: CPT | Performed by: INTERNAL MEDICINE

## 2019-05-28 PROCEDURE — 1036F TOBACCO NON-USER: CPT | Performed by: INTERNAL MEDICINE

## 2019-05-28 PROCEDURE — G8399 PT W/DXA RESULTS DOCUMENT: HCPCS | Performed by: INTERNAL MEDICINE

## 2019-05-28 PROCEDURE — 3023F SPIROM DOC REV: CPT | Performed by: INTERNAL MEDICINE

## 2019-05-28 PROCEDURE — 36415 COLL VENOUS BLD VENIPUNCTURE: CPT | Performed by: INTERNAL MEDICINE

## 2019-05-28 PROCEDURE — G8598 ASA/ANTIPLAT THER USED: HCPCS | Performed by: INTERNAL MEDICINE

## 2019-05-28 PROCEDURE — 1123F ACP DISCUSS/DSCN MKR DOCD: CPT | Performed by: INTERNAL MEDICINE

## 2019-05-28 PROCEDURE — G8419 CALC BMI OUT NRM PARAM NOF/U: HCPCS | Performed by: INTERNAL MEDICINE

## 2019-05-28 ASSESSMENT — ENCOUNTER SYMPTOMS
SORE THROAT: 0
COUGH: 1
SHORTNESS OF BREATH: 0
NAUSEA: 1

## 2019-05-30 ENCOUNTER — OFFICE VISIT (OUTPATIENT)
Dept: PULMONOLOGY | Age: 74
End: 2019-05-30
Payer: MEDICARE

## 2019-05-30 VITALS
OXYGEN SATURATION: 90 % | HEART RATE: 88 BPM | HEIGHT: 63 IN | SYSTOLIC BLOOD PRESSURE: 124 MMHG | WEIGHT: 153 LBS | TEMPERATURE: 98.3 F | RESPIRATION RATE: 20 BRPM | BODY MASS INDEX: 27.11 KG/M2 | DIASTOLIC BLOOD PRESSURE: 76 MMHG

## 2019-05-30 DIAGNOSIS — R50.9 FEVER, UNSPECIFIED FEVER CAUSE: ICD-10-CM

## 2019-05-30 DIAGNOSIS — R50.9 FEVER, UNSPECIFIED FEVER CAUSE: Primary | ICD-10-CM

## 2019-05-30 DIAGNOSIS — Z87.01 HISTORY OF PNEUMONIA, RECURRENT: ICD-10-CM

## 2019-05-30 PROCEDURE — 1090F PRES/ABSN URINE INCON ASSESS: CPT | Performed by: INTERNAL MEDICINE

## 2019-05-30 PROCEDURE — 3017F COLORECTAL CA SCREEN DOC REV: CPT | Performed by: INTERNAL MEDICINE

## 2019-05-30 PROCEDURE — G8598 ASA/ANTIPLAT THER USED: HCPCS | Performed by: INTERNAL MEDICINE

## 2019-05-30 PROCEDURE — 1123F ACP DISCUSS/DSCN MKR DOCD: CPT | Performed by: INTERNAL MEDICINE

## 2019-05-30 PROCEDURE — G8419 CALC BMI OUT NRM PARAM NOF/U: HCPCS | Performed by: INTERNAL MEDICINE

## 2019-05-30 PROCEDURE — G8399 PT W/DXA RESULTS DOCUMENT: HCPCS | Performed by: INTERNAL MEDICINE

## 2019-05-30 PROCEDURE — 1036F TOBACCO NON-USER: CPT | Performed by: INTERNAL MEDICINE

## 2019-05-30 PROCEDURE — 4040F PNEUMOC VAC/ADMIN/RCVD: CPT | Performed by: INTERNAL MEDICINE

## 2019-05-30 PROCEDURE — G8427 DOCREV CUR MEDS BY ELIG CLIN: HCPCS | Performed by: INTERNAL MEDICINE

## 2019-05-30 PROCEDURE — 99214 OFFICE O/P EST MOD 30 MIN: CPT | Performed by: INTERNAL MEDICINE

## 2019-05-30 NOTE — ASSESSMENT & PLAN NOTE
-BAL in hospital with neutrophil predominance, no organism isolated.  -has complted abx therapy, awaiting f/u CT to ensure radiographic resolution. Other than fevers her other si/sx of pneumolnia have improved.

## 2019-05-30 NOTE — ASSESSMENT & PLAN NOTE
-unknown cause. Possibly infectious but overall clinical picture points away from. More likely noninfectious including drug fever, malignancy, central, Connective tissue disease. If it is infectious would expect to be indolent atypical infectious such as tick-borne, zoonotic, tuberculsosis, etc of which she has no risk factors for.  -Age appropriate cancer screening.  -also unclear if tied to pulmonary process, awaiting f/u CT scan to see if PNA resolved. IF it has not it may play role in her relapsing fevers  -check procal, esr and CRP today.

## 2019-05-30 NOTE — PROGRESS NOTES
REASON FOR CONSULTATION:  Chief Complaint   Patient presents with    Follow-Up from Hospital     atypical PNA         Consult at request of Esther Marcano MD     PCP: Esther Marcano MD    HISTORY OF PRESENT ILLNESS: Nichol Pillai is a 75y.o. year old female with a history of emphysema and former tobacco smoking who presents for hospital follow up for recent CAP. Pt was admitted for CAP and treated empirically with abx. Her procal was negative and PNA was in somewhat of a strange apttern so underwent BAL that showed neutrophil predominance. No organisms grew. Her fevers resolved and was discharged. Since discharge she has been following with PCP for recurrence of her fevers. She has relapsing fevers almost everynight often with tmax of 102F. She has drenching night sweats. Otherwise, she has no respiratory complaints. Had recent flare of collagenous colitis so was startedon budesonide thinking this may be noninfectious cause of annika, but fevers persist.      PFTs were not obtained prior to appt. Past Medical History:   Diagnosis Date    Allergic rhinitis     Asthma     Bunion, right foot 12/2013    CAD (coronary artery disease) 6/7/2010    CAD (coronary artery disease)     Gout     Hammertoe 12/12/2013    Hyperlipidemia     Hypertension     IBS (irritable bowel syndrome) 6/7/2010    Obstructive chronic bronchitis with acute bronchitis ([address] 4/1/2019    Pneumonia 2010    Psoriasis        Past Surgical History:   Procedure Laterality Date    BRONCHOSCOPY N/A 4/23/2019    BRONCHOSCOPY WITH BRONCHOALVEOLAR LAVAGE performed by Makayla Jo MD at [facility]  4/23/2019    BRONCHOSCOPY DIAGNOSTIC OR CELL [address] ONLY performed by Makayla Jo MD at [address] [facility] [address] Right 12/12/2013    COLONOSCOPY  2009    neg.     COLONOSCOPY N/A 3/20/2019    COLONOSCOPY WITH BIOPSY performed by Joyce Maldonado MD at [place] [address] 325 mg by mouth daily      triamterene-hydrochlorothiazide (MAXZIDE-25) 37.5-25 MG per tablet Take 1 tablet by mouth daily      allopurinol (ZYLOPRIM) 300 MG tablet TAKE 1 TABLET EVERY DAY 90 tablet 3    VENTOLIN  (90 Base) MCG/ACT inhaler INHALE 2 PUFFS INTO THE LUNGS EVERY 6 HOURS AS NEEDED FOR WHEEZING 18 Inhaler 1    clobetasol (TEMOVATE) 0.05 % cream Apply topically 2 times daily 60 g 1    losartan (COZAAR) 50 MG tablet Take 0.5 tablets by mouth daily 30 tablet 0    medroxyPROGESTERone (PROVERA) 2.5 MG tablet Take 2.5 mg by mouth every other day       estradiol (ESTRACE) 0.5 MG tablet Take 0.5 mg by mouth daily.  atorvastatin (LIPITOR) 40 MG tablet       Loratadine (CLARITIN PO) Take 10 mg by mouth daily.  atenolol (TENORMIN) 25 MG tablet Take 25 mg by mouth daily.  budesonide (ENTOCORT EC) 3 MG extended release capsule Take 1 capsule by mouth 3 times daily 90 capsule 3    prochlorperazine (COMPAZINE) 10 MG tablet Take 1 tablet by mouth every 6 hours as needed 50 tablet 3     No current facility-administered medications for this visit. Data Reviewed:   CBC and Renal reviewed  Last CBC  Lab Results   Component Value Date    WBC 9.9 05/28/2019    RBC 3.86 05/28/2019    HGB 11.6 05/28/2019    MCV 89.4 05/28/2019     05/28/2019     Last Renal  Lab Results   Component Value Date     04/25/2019    K 4.1 04/25/2019    K 3.9 03/20/2019     04/25/2019    CO2 25 04/25/2019    CO2 23 04/24/2019    CO2 23 04/23/2019    BUN 15 04/25/2019    CREATININE 0.8 04/25/2019    GLUCOSE 106 04/25/2019    CALCIUM 8.2 04/25/2019       Last ABG  POC Blood Gas: No results found for: POCPH, POCPCO2, POCPO2, POCHCO3, NBEA, GDEH2TLL  No results for input(s): PH, PCO2, PO2, HCO3, BE, O2SAT in the last 72 hours. Radiology Review:  Pertinent images / reports were reviewed as a part of this visit. CT Chest w/ contrast: No results found for this or any previous visit.     CT Chest from smoking  including emphysema. 3. Calcific atherosclerosis aorta and coronary arteries. RECOMMENDATIONS:  IV contrast-enhanced CT chest in 3-4 months         CTPA: No results found for this or any previous visit. CXR PA/LAT:   Results for orders placed during the hospital encounter of 04/15/19   XR CHEST STANDARD (2 VW)    Narrative EXAMINATION:  TWO VIEWS OF THE CHEST    4/15/2019 1:34 pm    COMPARISON:  04/01/2019    HISTORY:  ORDERING SYSTEM PROVIDED HISTORY: Pneumonia due to infectious organism,  unspecified laterality, unspecified part of lung  TECHNOLOGIST PROVIDED HISTORY:  Reason for exam:->pneumonia  Ordering Physician Provided Reason for Exam: follow up pneumonia  Acuity: Acute  Type of Exam: Initial  Relevant Medical/Surgical History: former smoker cad borderline copd    FINDINGS:  Normal heart size and pulmonary vasculature. Aortic calcifications. Slight  improvement of left basilar consolidation suggesting resolving pneumonia or  atelectasis. Stable opacities in the right lateral upper lung likely  scarring. No effusion. No pneumothorax. Hyperinflated lungs with flattened  diaphragms compatible with emphysematous changes      Impression Slight improvement in left basilar consolidation suggesting resolving  pneumonia or atelectasis. Continued follow-up advised to ensure complete  resolution. Emphysematous changes. CXR portable: No results found for this or any previous visit. Pulmonary function testing  pending    Assessment/Plan:       Diagnosis Orders   1. Fever, unspecified fever cause  SEDIMENTATION RATE    C-REACTIVE PROTEIN    Procalcitonin   2. History of pneumonia, recurrent           Problem List Items Addressed This Visit     History of pneumonia, recurrent     -BAL in hospital with neutrophil predominance, no organism isolated.  -has complted abx therapy, awaiting f/u CT to ensure radiographic resolution.   Other than fevers her other si/sx of pneumolnia have improved. Fever - Primary     -unknown cause. Possibly infectious but overall clinical picture points away from. More likely noninfectious including drug fever, malignancy, central, Connective tissue disease. If it is infectious would expect to be indolent atypical infectious such as tick-borne, zoonotic, tuberculsosis, etc of which she has no risk factors for.  -Age appropriate cancer screening.  -also unclear if tied to pulmonary process, awaiting f/u CT scan to see if PNA resolved. IF it has not it may play role in her relapsing fevers  -check procal, esr and CRP today. Relevant Orders    SEDIMENTATION RATE    C-REACTIVE PROTEIN    Procalcitonin           This note was transcribed using [id number]. Please disregard any translational errors.     Julius Haro [address] [place] [facility]

## 2019-05-31 LAB
C-REACTIVE PROTEIN: 136.7 MG/L (ref 0–5.1)
PROCALCITONIN: 0.12 NG/ML (ref 0–0.15)
SEDIMENTATION RATE, ERYTHROCYTE: 102 MM/HR (ref 0–30)

## 2019-06-03 ENCOUNTER — TELEPHONE (OUTPATIENT)
Dept: INTERNAL MEDICINE CLINIC | Age: 74
End: 2019-06-03

## 2019-06-03 RX ORDER — BUDESONIDE AND FORMOTEROL FUMARATE DIHYDRATE 160; 4.5 UG/1; UG/1
2 AEROSOL RESPIRATORY (INHALATION) 2 TIMES DAILY
Qty: 1 INHALER | Refills: 5 | Status: SHIPPED | OUTPATIENT
Start: 2019-06-03 | End: 2020-12-29 | Stop reason: SDUPTHER

## 2019-06-03 NOTE — TELEPHONE ENCOUNTER
Pt calling b/c she's noticed that over the past week she's been out of breath. Pt has an inhaler but it doesn't seem to be helping. Pt wants to know if dr Chares Lundborg would prescribe her a different inhaler or is there anything else that she can do? [facility] uses [facility] in [place].

## 2019-06-11 ENCOUNTER — CARE COORDINATION (OUTPATIENT)
Dept: CASE MANAGEMENT | Age: 74
End: 2019-06-11

## 2019-06-11 LAB
AFB CULTURE (MYCOBACTERIA): NORMAL
AFB CULTURE (MYCOBACTERIA): NORMAL
AFB SMEAR: NORMAL
AFB SMEAR: NORMAL

## 2019-06-11 NOTE — CARE COORDINATION
[facility] Transitions Follow Up Call    2019    Patient: Cherylann Hammans  Patient : 1945   MRN: [id number]  Reason for Admission: multifocal pneumonia  Discharge Date: 19 RARS: Readmission Risk Score: 14         Spoke with: patient, [address] Transitions Subsequent and Final Call    Subsequent and Final Calls  Do you have any ongoing symptoms?:  Yes  Do you have any questions related to your medications?:  No  Care Transitions Interventions  Other Interventions:          Spoke with patient, Vertie Cooks. She stated she has had a fever off and on since she was discharged from the hospital.  She did complete her antibiotics and prednisone and stated she had 20 days of antibiotics before hospitalization. She stated now has a pulmonologist, Dr Hector Serrano and has seen her PCP Dr. Jose Stern several times since discharge. Vertie Cooks states MDs unable to determine why she is still having fevers. States she had her last fever 3 days ago and that the last 3 days have been the best she has had. States she still gets SOB with exertion. She is having a CT Scan on Thursday ordered by Dr. Jose Stern. Explained to patient this was the final transition call and that hopefully this is the turning point of her issues. She verbalized understanding and appreciation for the call. Care transition ended.     Follow Up  Future Appointments   Date Time Provider Branden Tim   2019 11:40 AM [facility] 1 [facility]   2019  1:20 PM Jeremy Webber MD [facility] PULM [facility]       Dulce Kaye RN

## 2019-06-13 ENCOUNTER — HOSPITAL ENCOUNTER (OUTPATIENT)
Dept: CT IMAGING | Age: 74
Discharge: HOME | End: 2019-06-13
Payer: MEDICARE

## 2019-06-13 DIAGNOSIS — R93.89 ABNORMAL CT OF THE CHEST: ICD-10-CM

## 2019-06-13 PROCEDURE — 6360000004 HC RX CONTRAST MEDICATION: Performed by: INTERNAL MEDICINE

## 2019-06-13 PROCEDURE — 71260 CT THORAX DX C+: CPT

## 2019-06-13 RX ADMIN — IOPAMIDOL 75 ML: 755 INJECTION, SOLUTION INTRAVENOUS at 11:37

## 2019-06-14 DIAGNOSIS — J20.9 ACUTE BRONCHITIS WITH ASTHMA WITH ACUTE EXACERBATION: ICD-10-CM

## 2019-06-14 DIAGNOSIS — J45.901 ACUTE BRONCHITIS WITH ASTHMA WITH ACUTE EXACERBATION: ICD-10-CM

## 2019-06-14 RX ORDER — ALBUTEROL SULFATE 90 UG/1
2 AEROSOL, METERED RESPIRATORY (INHALATION) EVERY 6 HOURS PRN
Qty: 18 INHALER | Refills: 1 | Status: SHIPPED | OUTPATIENT
Start: 2019-06-14

## 2019-07-01 ENCOUNTER — OFFICE VISIT (OUTPATIENT)
Dept: INTERNAL MEDICINE CLINIC | Age: 74
End: 2019-07-01
Payer: MEDICARE

## 2019-07-01 VITALS
DIASTOLIC BLOOD PRESSURE: 82 MMHG | SYSTOLIC BLOOD PRESSURE: 130 MMHG | OXYGEN SATURATION: 92 % | HEART RATE: 68 BPM | TEMPERATURE: 97.9 F | WEIGHT: 150 LBS | BODY MASS INDEX: 26.57 KG/M2

## 2019-07-01 DIAGNOSIS — K29.00 ACUTE SUPERFICIAL GASTRITIS WITHOUT HEMORRHAGE: Primary | ICD-10-CM

## 2019-07-01 DIAGNOSIS — D64.9 ANEMIA, UNSPECIFIED TYPE: ICD-10-CM

## 2019-07-01 DIAGNOSIS — R10.13 DYSPEPSIA: ICD-10-CM

## 2019-07-01 PROCEDURE — 99214 OFFICE O/P EST MOD 30 MIN: CPT | Performed by: INTERNAL MEDICINE

## 2019-07-01 PROCEDURE — 3017F COLORECTAL CA SCREEN DOC REV: CPT | Performed by: INTERNAL MEDICINE

## 2019-07-01 PROCEDURE — 1123F ACP DISCUSS/DSCN MKR DOCD: CPT | Performed by: INTERNAL MEDICINE

## 2019-07-01 PROCEDURE — 1036F TOBACCO NON-USER: CPT | Performed by: INTERNAL MEDICINE

## 2019-07-01 PROCEDURE — 4040F PNEUMOC VAC/ADMIN/RCVD: CPT | Performed by: INTERNAL MEDICINE

## 2019-07-01 PROCEDURE — G8427 DOCREV CUR MEDS BY ELIG CLIN: HCPCS | Performed by: INTERNAL MEDICINE

## 2019-07-01 PROCEDURE — 1090F PRES/ABSN URINE INCON ASSESS: CPT | Performed by: INTERNAL MEDICINE

## 2019-07-01 PROCEDURE — G8399 PT W/DXA RESULTS DOCUMENT: HCPCS | Performed by: INTERNAL MEDICINE

## 2019-07-01 PROCEDURE — G8598 ASA/ANTIPLAT THER USED: HCPCS | Performed by: INTERNAL MEDICINE

## 2019-07-01 PROCEDURE — G8419 CALC BMI OUT NRM PARAM NOF/U: HCPCS | Performed by: INTERNAL MEDICINE

## 2019-07-01 RX ORDER — SUCRALFATE 1 G/1
1 TABLET ORAL 4 TIMES DAILY
Qty: 120 TABLET | Refills: 3 | Status: SHIPPED | OUTPATIENT
Start: 2019-07-01 | End: 2019-11-11 | Stop reason: ALTCHOICE

## 2019-07-01 RX ORDER — TRAMADOL HYDROCHLORIDE 50 MG/1
50 TABLET ORAL EVERY 6 HOURS PRN
Qty: 28 TABLET | Refills: 0 | Status: SHIPPED | OUTPATIENT
Start: 2019-07-01 | End: 2019-07-08

## 2019-07-01 ASSESSMENT — ENCOUNTER SYMPTOMS
ABDOMINAL PAIN: 1
NAUSEA: 1

## 2019-07-02 ENCOUNTER — HOSPITAL ENCOUNTER (INPATIENT)
Age: 74
LOS: 3 days | Discharge: HOME | DRG: 682 | End: 2019-07-05
Attending: INTERNAL MEDICINE | Admitting: INTERNAL MEDICINE
Payer: MEDICARE

## 2019-07-02 ENCOUNTER — TELEPHONE (OUTPATIENT)
Dept: INTERNAL MEDICINE CLINIC | Age: 74
End: 2019-07-02

## 2019-07-02 ENCOUNTER — APPOINTMENT (OUTPATIENT)
Dept: CT IMAGING | Age: 74
DRG: 682 | End: 2019-07-02
Payer: MEDICARE

## 2019-07-02 DIAGNOSIS — K85.90 ACUTE PANCREATITIS, UNSPECIFIED COMPLICATION STATUS, UNSPECIFIED PANCREATITIS TYPE: Primary | ICD-10-CM

## 2019-07-02 DIAGNOSIS — N17.9 AKI (ACUTE KIDNEY INJURY) (HCC): ICD-10-CM

## 2019-07-02 DIAGNOSIS — N39.0 URINARY TRACT INFECTION WITHOUT HEMATURIA, SITE UNSPECIFIED: ICD-10-CM

## 2019-07-02 LAB
A/G RATIO: 0.8 (ref 1.1–2.2)
ALBUMIN SERPL-MCNC: 3.5 G/DL (ref 3.4–5)
ALP BLD-CCNC: 96 U/L (ref 40–129)
ALT SERPL-CCNC: 9 U/L (ref 10–40)
ANION GAP SERPL CALCULATED.3IONS-SCNC: 19 MMOL/L (ref 3–16)
AST SERPL-CCNC: 16 U/L (ref 15–37)
BACTERIA: ABNORMAL /HPF
BASOPHILS ABSOLUTE: 0.1 K/UL (ref 0–0.2)
BASOPHILS RELATIVE PERCENT: 0.5 %
BILIRUB SERPL-MCNC: 0.4 MG/DL (ref 0–1)
BILIRUBIN URINE: NEGATIVE
BLOOD, URINE: NEGATIVE
BUN BLDV-MCNC: 64 MG/DL (ref 7–20)
CALCIUM SERPL-MCNC: 10.4 MG/DL (ref 8.3–10.6)
CHLORIDE BLD-SCNC: 104 MMOL/L (ref 99–110)
CLARITY: ABNORMAL
CO2: 17 MMOL/L (ref 21–32)
COLOR: YELLOW
CREAT SERPL-MCNC: 1.4 MG/DL (ref 0.6–1.2)
EOSINOPHILS ABSOLUTE: 0 K/UL (ref 0–0.6)
EOSINOPHILS RELATIVE PERCENT: 0.2 %
EPITHELIAL CELLS, UA: 16 /HPF (ref 0–5)
GFR AFRICAN AMERICAN: 44
GFR NON-AFRICAN AMERICAN: 37
GLOBULIN: 4.3 G/DL
GLUCOSE BLD-MCNC: 122 MG/DL (ref 70–99)
GLUCOSE URINE: NEGATIVE MG/DL
HCT VFR BLD CALC: 38.6 % (ref 36–48)
HEMOGLOBIN: 12.3 G/DL (ref 12–16)
HYALINE CASTS: 10 /LPF (ref 0–8)
KETONES, URINE: NEGATIVE MG/DL
LEUKOCYTE ESTERASE, URINE: ABNORMAL
LIPASE: 2491 U/L (ref 13–60)
LYMPHOCYTES ABSOLUTE: 2 K/UL (ref 1–5.1)
LYMPHOCYTES RELATIVE PERCENT: 9.2 %
MCH RBC QN AUTO: 28.5 PG (ref 26–34)
MCHC RBC AUTO-ENTMCNC: 31.9 G/DL (ref 31–36)
MCV RBC AUTO: 89.2 FL (ref 80–100)
MICROSCOPIC EXAMINATION: YES
MONOCYTES ABSOLUTE: 1 K/UL (ref 0–1.3)
MONOCYTES RELATIVE PERCENT: 4.5 %
NEUTROPHILS ABSOLUTE: 18.5 K/UL (ref 1.7–7.7)
NEUTROPHILS RELATIVE PERCENT: 85.6 %
NITRITE, URINE: NEGATIVE
PDW BLD-RTO: 17.3 % (ref 12.4–15.4)
PH UA: 5 (ref 5–8)
PLATELET # BLD: 429 K/UL (ref 135–450)
PMV BLD AUTO: 7.6 FL (ref 5–10.5)
POTASSIUM REFLEX MAGNESIUM: 3.8 MMOL/L (ref 3.5–5.1)
PROTEIN UA: 30 MG/DL
RBC # BLD: 4.33 M/UL (ref 4–5.2)
RBC UA: ABNORMAL /HPF (ref 0–2)
SODIUM BLD-SCNC: 140 MMOL/L (ref 136–145)
SPECIFIC GRAVITY UA: 1.02 (ref 1–1.03)
TOTAL PROTEIN: 7.8 G/DL (ref 6.4–8.2)
TROPONIN: <0.01 NG/ML
URINE REFLEX TO CULTURE: YES
URINE TYPE: ABNORMAL
UROBILINOGEN, URINE: 0.2 E.U./DL
WBC # BLD: 21.6 K/UL (ref 4–11)
WBC UA: 27 /HPF (ref 0–5)
YEAST: PRESENT /HPF

## 2019-07-02 PROCEDURE — 80053 COMPREHEN METABOLIC PANEL: CPT

## 2019-07-02 PROCEDURE — 81001 URINALYSIS AUTO W/SCOPE: CPT

## 2019-07-02 PROCEDURE — 93005 ELECTROCARDIOGRAM TRACING: CPT | Performed by: NURSE PRACTITIONER

## 2019-07-02 PROCEDURE — 1200000000 HC SEMI PRIVATE

## 2019-07-02 PROCEDURE — 96376 TX/PRO/DX INJ SAME DRUG ADON: CPT

## 2019-07-02 PROCEDURE — 96375 TX/PRO/DX INJ NEW DRUG ADDON: CPT

## 2019-07-02 PROCEDURE — 87086 URINE CULTURE/COLONY COUNT: CPT

## 2019-07-02 PROCEDURE — 96361 HYDRATE IV INFUSION ADD-ON: CPT

## 2019-07-02 PROCEDURE — 74176 CT ABD & PELVIS W/O CONTRAST: CPT

## 2019-07-02 PROCEDURE — 6370000000 HC RX 637 (ALT 250 FOR IP): Performed by: NURSE PRACTITIONER

## 2019-07-02 PROCEDURE — 96365 THER/PROPH/DIAG IV INF INIT: CPT

## 2019-07-02 PROCEDURE — 6360000002 HC RX W HCPCS: Performed by: NURSE PRACTITIONER

## 2019-07-02 PROCEDURE — C9113 INJ PANTOPRAZOLE SODIUM, VIA: HCPCS | Performed by: NURSE PRACTITIONER

## 2019-07-02 PROCEDURE — 85025 COMPLETE CBC W/AUTO DIFF WBC: CPT

## 2019-07-02 PROCEDURE — 84484 ASSAY OF TROPONIN QUANT: CPT

## 2019-07-02 PROCEDURE — 83690 ASSAY OF LIPASE: CPT

## 2019-07-02 PROCEDURE — 2580000003 HC RX 258: Performed by: NURSE PRACTITIONER

## 2019-07-02 PROCEDURE — 99285 EMERGENCY DEPT VISIT HI MDM: CPT

## 2019-07-02 RX ORDER — 0.9 % SODIUM CHLORIDE 0.9 %
1000 INTRAVENOUS SOLUTION INTRAVENOUS ONCE
Status: COMPLETED | OUTPATIENT
Start: 2019-07-02 | End: 2019-07-02

## 2019-07-02 RX ORDER — DOXYCYCLINE 100 MG/1
100 CAPSULE ORAL 2 TIMES DAILY
Refills: 0 | COMMUNITY
Start: 2019-06-23 | End: 2019-07-22

## 2019-07-02 RX ORDER — MORPHINE SULFATE 2 MG/ML
4 INJECTION, SOLUTION INTRAMUSCULAR; INTRAVENOUS ONCE
Status: COMPLETED | OUTPATIENT
Start: 2019-07-02 | End: 2019-07-02

## 2019-07-02 RX ORDER — ONDANSETRON 2 MG/ML
4 INJECTION INTRAMUSCULAR; INTRAVENOUS ONCE
Status: COMPLETED | OUTPATIENT
Start: 2019-07-02 | End: 2019-07-02

## 2019-07-02 RX ORDER — PANTOPRAZOLE SODIUM 40 MG/10ML
80 INJECTION, POWDER, LYOPHILIZED, FOR SOLUTION INTRAVENOUS ONCE
Status: COMPLETED | OUTPATIENT
Start: 2019-07-02 | End: 2019-07-02

## 2019-07-02 RX ADMIN — LIDOCAINE HYDROCHLORIDE: 20 SOLUTION ORAL; TOPICAL at 20:08

## 2019-07-02 RX ADMIN — MORPHINE SULFATE 4 MG: 2 INJECTION, SOLUTION INTRAMUSCULAR; INTRAVENOUS at 21:58

## 2019-07-02 RX ADMIN — MORPHINE SULFATE 4 MG: 2 INJECTION, SOLUTION INTRAMUSCULAR; INTRAVENOUS at 20:06

## 2019-07-02 RX ADMIN — PANTOPRAZOLE SODIUM 80 MG: 40 INJECTION, POWDER, FOR SOLUTION INTRAVENOUS at 20:03

## 2019-07-02 RX ADMIN — SODIUM CHLORIDE 1000 ML: 9 INJECTION, SOLUTION INTRAVENOUS at 20:01

## 2019-07-02 RX ADMIN — ONDANSETRON 4 MG: 2 INJECTION INTRAMUSCULAR; INTRAVENOUS at 20:00

## 2019-07-02 RX ADMIN — CEFTRIAXONE 1 G: 1 INJECTION, POWDER, FOR SOLUTION INTRAMUSCULAR; INTRAVENOUS at 21:57

## 2019-07-02 ASSESSMENT — ENCOUNTER SYMPTOMS
DIARRHEA: 0
BACK PAIN: 0
BLOOD IN STOOL: 0
CONSTIPATION: 0
NAUSEA: 1
SHORTNESS OF BREATH: 0
COLOR CHANGE: 0
ABDOMINAL DISTENTION: 0
ABDOMINAL PAIN: 1
VOMITING: 1

## 2019-07-02 ASSESSMENT — PAIN DESCRIPTION - ORIENTATION
ORIENTATION: MID
ORIENTATION: MID

## 2019-07-02 ASSESSMENT — PAIN SCALES - GENERAL
PAINLEVEL_OUTOF10: 4
PAINLEVEL_OUTOF10: 7
PAINLEVEL_OUTOF10: 7
PAINLEVEL_OUTOF10: 4
PAINLEVEL_OUTOF10: 0
PAINLEVEL_OUTOF10: 0

## 2019-07-02 ASSESSMENT — PAIN - FUNCTIONAL ASSESSMENT
PAIN_FUNCTIONAL_ASSESSMENT: ACTIVITIES ARE NOT PREVENTED
PAIN_FUNCTIONAL_ASSESSMENT: ACTIVITIES ARE NOT PREVENTED

## 2019-07-02 ASSESSMENT — PAIN DESCRIPTION - PAIN TYPE
TYPE: ACUTE PAIN

## 2019-07-02 ASSESSMENT — PAIN DESCRIPTION - ONSET
ONSET: ON-GOING
ONSET: ON-GOING

## 2019-07-02 ASSESSMENT — PAIN DESCRIPTION - DESCRIPTORS
DESCRIPTORS: CONSTANT;CRAMPING
DESCRIPTORS: ACHING
DESCRIPTORS: ACHING

## 2019-07-02 ASSESSMENT — PAIN DESCRIPTION - PROGRESSION
CLINICAL_PROGRESSION: GRADUALLY IMPROVING
CLINICAL_PROGRESSION: GRADUALLY WORSENING

## 2019-07-02 ASSESSMENT — PAIN DESCRIPTION - FREQUENCY
FREQUENCY: CONTINUOUS
FREQUENCY: CONTINUOUS

## 2019-07-02 ASSESSMENT — PAIN DESCRIPTION - LOCATION
LOCATION: ABDOMEN

## 2019-07-02 NOTE — ED PROVIDER NOTES
Respiratory: Negative for shortness of breath. Cardiovascular: Negative for chest pain. Gastrointestinal: Positive for abdominal pain, nausea and vomiting. Negative for abdominal distention, blood in stool, constipation and diarrhea. Genitourinary: Negative for dysuria, flank pain, frequency and hematuria. Musculoskeletal: Negative for back pain and myalgias. Skin: Negative for color change and rash. Allergic/Immunologic: Negative for immunocompromised state. Neurological: Negative for headaches. Hematological: Negative for adenopathy. Psychiatric/Behavioral: Negative for confusion. All other systems reviewed and are negative. Positives and Pertinent negatives as per HPI. Except as noted above in the ROS, problem specific ROS was completed and is negative. Physical Exam:  Physical Exam   Constitutional: She is oriented to person, place, and time. Vital signs are normal. She appears well-developed and well-nourished. Non-toxic appearance. No distress. HENT:   Head: Normocephalic and atraumatic. Eyes: Conjunctivae are normal. No scleral icterus. Neck: Full passive range of motion without pain. Neck supple. No JVD present. No neck rigidity. Cardiovascular: Normal rate and regular rhythm. Exam reveals no gallop and no friction rub. No murmur heard. Pulmonary/Chest: Effort normal and breath sounds normal. No respiratory distress. Abdominal: Soft. Normal appearance and bowel sounds are normal. She exhibits no distension and no mass. There is tenderness in the epigastric area and periumbilical area. There is no rigidity and no guarding. No hernia. Musculoskeletal: Normal range of motion. Neurological: She is alert and oriented to person, place, and time. No cranial nerve deficit. Skin: Skin is warm, dry and intact. Capillary refill takes less than 2 seconds. No rash noted. Psychiatric: She has a normal mood and affect. Nursing note and vitals reviewed.       MEDICAL DECISION MAKING    Vitals:    Vitals:    07/02/19 2102 07/02/19 2131 07/02/19 2150 07/02/19 2231   BP: (!) 124/39 (!) 126/43  (!) 102/51   Pulse: 71 70 73 69   Resp: 16 15 18 16   Temp:       TempSrc:       SpO2: 95% 98% 99% 95%   Weight:           LABS:  Labs Reviewed   CBC WITH AUTO DIFFERENTIAL - Abnormal; Notable for the following components:       Result Value    WBC 21.6 (*)     RDW 17.3 (*)     Neutrophils # 18.5 (*)     All other components within normal limits    Narrative:     Performed at:  [address] [facility] [employer] [address]   Phone [phone]   COMPREHENSIVE METABOLIC PANEL W/ REFLEX TO MG FOR LOW K - Abnormal; Notable for the following components:    CO2 17 (*)     Anion Gap 19 (*)     Glucose 122 (*)     BUN 64 (*)     CREATININE 1.4 (*)     GFR Non- 37 (*)     GFR African American 44 (*)     Albumin/Globulin Ratio 0.8 (*)     ALT 9 (*)     All other components within normal limits    Narrative:     Performed at:  [facility]  [address] [place] [employer] [address]   Phone [phone]   LIPASE - Abnormal; Notable for the following components:    Lipase 2,491.0 (*)     All other components within normal limits    Narrative:     Performed at:  [facility]  [address] [place] [employer] [address]   Phone [phone]   URINE RT REFLEX TO CULTURE - Abnormal; Notable for the following components:    Clarity, UA CLOUDY (*)     Protein, UA 30 (*)     Leukocyte Esterase, Urine SMALL (*)     All other components within normal limits    Narrative:     Performed at:  [facility]  [address] [place] [facility] [employer] [address]   Phone [phone]   MICROSCOPIC URINALYSIS - Abnormal; Notable for the following components:    Bacteria, UA 1+ (*)     Yeast, UA Present (*)     Hyaline Casts, UA 10 (*)     WBC, UA 27 (*)     Epi Cells 16 (*) Obstruction), PUD, GERD, Acute Cholecystitis, Pancreatitis, Hepatitis, Colitis, SMA Syndrome, Mesenteric Steal Syndrome, Splanchnic Vein Thrombosis, other    Patient presents with upper abdominal pain. See HPI for full presentation. Physical exam as above. Overall this is a well-appearing pleasant 50-year-old female lying in bed in no acute distress. She has upper abdominal tenderness. Her abdomen is soft without rigidity guarding or peritoneal signs. Lipase is 2500 and CT of the abdomen pelvis shows acute pancreatitis. Troponin is negative. Chemistry shows SAIDA with a creatinine of 1.4 and GFR of 37. Glucose is 120 with an anion gap of 19 and bicarb of 17. No electrolyte abnormality or liver dysfunction. She has leukocytosis likely from retching. No bandemia. No anemia. Urine shows small leukocytes with 27 WBCs and it also shows yeast.  Urine is contaminated with 16 epi cells. Emergency department course included pain and nausea medication, fluids, GI cocktail, Protonix, and Rocephin. She was resting comfortably. She will be admitted for further work-up. She was given all test results and given an opportunity to ask and have any questions answered. She was agreeable to admission. The hospitalist, Dr. Urbano Doyle, was consulted and agreed to admit patient and write orders. The patient tolerated their visit well. I evaluated the patient. The physician was available for consultation as needed. The patient and / or the family were informed of the results of anytests, a time was given to answer questions, a plan was proposed and they agreed with plan. CLINICAL IMPRESSION:  1. Acute pancreatitis, unspecified complication status, unspecified pancreatitis type    2. SAIDA (acute kidney injury) ([facility] [address]    3. Urinary tract infection without hematuria, site unspecified        DISPOSITION Decision To Admit 07/02/2019 09:12:16 PM      PATIENT REFERRED TO:  No follow-up provider specified.     DISCHARGE

## 2019-07-02 NOTE — ED PROVIDER NOTES
EKG interpretation    Sinus rhythm with first-degree AV block and a ventricular rate of 67 specific ST-T wave changes      Freda Lux MD  07/02/19 [id number]

## 2019-07-03 ENCOUNTER — APPOINTMENT (OUTPATIENT)
Dept: ULTRASOUND IMAGING | Age: 74
DRG: 682 | End: 2019-07-03
Payer: MEDICARE

## 2019-07-03 LAB
A/G RATIO: 0.9 (ref 1.1–2.2)
ALBUMIN SERPL-MCNC: 2.9 G/DL (ref 3.4–5)
ALP BLD-CCNC: 77 U/L (ref 40–129)
ALT SERPL-CCNC: 6 U/L (ref 10–40)
ANION GAP SERPL CALCULATED.3IONS-SCNC: 11 MMOL/L (ref 3–16)
AST SERPL-CCNC: 13 U/L (ref 15–37)
BILIRUB SERPL-MCNC: 0.3 MG/DL (ref 0–1)
BUN BLDV-MCNC: 49 MG/DL (ref 7–20)
CALCIUM SERPL-MCNC: 9.4 MG/DL (ref 8.3–10.6)
CHLORIDE BLD-SCNC: 106 MMOL/L (ref 99–110)
CO2: 22 MMOL/L (ref 21–32)
CREAT SERPL-MCNC: 1.3 MG/DL (ref 0.6–1.2)
EKG ATRIAL RATE: 67 BPM
EKG DIAGNOSIS: NORMAL
EKG P AXIS: 70 DEGREES
EKG P-R INTERVAL: 212 MS
EKG Q-T INTERVAL: 394 MS
EKG QRS DURATION: 78 MS
EKG QTC CALCULATION (BAZETT): 416 MS
EKG R AXIS: 1 DEGREES
EKG T AXIS: 92 DEGREES
EKG VENTRICULAR RATE: 67 BPM
GFR AFRICAN AMERICAN: 48
GFR NON-AFRICAN AMERICAN: 40
GLOBULIN: 3.1 G/DL
GLUCOSE BLD-MCNC: 85 MG/DL (ref 70–99)
LIPASE: 1257 U/L (ref 13–60)
POTASSIUM SERPL-SCNC: 3.1 MMOL/L (ref 3.5–5.1)
SODIUM BLD-SCNC: 139 MMOL/L (ref 136–145)
TOTAL PROTEIN: 6 G/DL (ref 6.4–8.2)

## 2019-07-03 PROCEDURE — 2580000003 HC RX 258: Performed by: INTERNAL MEDICINE

## 2019-07-03 PROCEDURE — 94640 AIRWAY INHALATION TREATMENT: CPT

## 2019-07-03 PROCEDURE — 6360000002 HC RX W HCPCS: Performed by: INTERNAL MEDICINE

## 2019-07-03 PROCEDURE — 6370000000 HC RX 637 (ALT 250 FOR IP): Performed by: INTERNAL MEDICINE

## 2019-07-03 PROCEDURE — 93010 ELECTROCARDIOGRAM REPORT: CPT | Performed by: INTERNAL MEDICINE

## 2019-07-03 PROCEDURE — 1200000000 HC SEMI PRIVATE

## 2019-07-03 PROCEDURE — 80053 COMPREHEN METABOLIC PANEL: CPT

## 2019-07-03 PROCEDURE — 83690 ASSAY OF LIPASE: CPT

## 2019-07-03 PROCEDURE — 94760 N-INVAS EAR/PLS OXIMETRY 1: CPT

## 2019-07-03 PROCEDURE — 36415 COLL VENOUS BLD VENIPUNCTURE: CPT

## 2019-07-03 PROCEDURE — 76705 ECHO EXAM OF ABDOMEN: CPT

## 2019-07-03 RX ORDER — MEDROXYPROGESTERONE ACETATE 2.5 MG/1
2.5 TABLET ORAL EVERY OTHER DAY
Status: DISCONTINUED | OUTPATIENT
Start: 2019-07-03 | End: 2019-07-05 | Stop reason: HOSPADM

## 2019-07-03 RX ORDER — SODIUM CHLORIDE, SODIUM LACTATE, POTASSIUM CHLORIDE, CALCIUM CHLORIDE 600; 310; 30; 20 MG/100ML; MG/100ML; MG/100ML; MG/100ML
150 INJECTION, SOLUTION INTRAVENOUS CONTINUOUS
Status: DISCONTINUED | OUTPATIENT
Start: 2019-07-03 | End: 2019-07-05 | Stop reason: HOSPADM

## 2019-07-03 RX ORDER — ONDANSETRON 2 MG/ML
4 INJECTION INTRAMUSCULAR; INTRAVENOUS EVERY 6 HOURS PRN
Status: DISCONTINUED | OUTPATIENT
Start: 2019-07-03 | End: 2019-07-05 | Stop reason: HOSPADM

## 2019-07-03 RX ORDER — SODIUM CHLORIDE 0.9 % (FLUSH) 0.9 %
10 SYRINGE (ML) INJECTION EVERY 12 HOURS SCHEDULED
Status: DISCONTINUED | OUTPATIENT
Start: 2019-07-03 | End: 2019-07-05 | Stop reason: HOSPADM

## 2019-07-03 RX ORDER — SUCRALFATE 1 G/1
1 TABLET ORAL
Status: DISCONTINUED | OUTPATIENT
Start: 2019-07-03 | End: 2019-07-03

## 2019-07-03 RX ORDER — MORPHINE SULFATE 2 MG/ML
2 INJECTION, SOLUTION INTRAMUSCULAR; INTRAVENOUS
Status: DISCONTINUED | OUTPATIENT
Start: 2019-07-03 | End: 2019-07-05 | Stop reason: HOSPADM

## 2019-07-03 RX ORDER — SODIUM CHLORIDE 0.9 % (FLUSH) 0.9 %
10 SYRINGE (ML) INJECTION PRN
Status: DISCONTINUED | OUTPATIENT
Start: 2019-07-03 | End: 2019-07-05 | Stop reason: HOSPADM

## 2019-07-03 RX ORDER — LOSARTAN POTASSIUM 25 MG/1
25 TABLET ORAL DAILY
Status: DISCONTINUED | OUTPATIENT
Start: 2019-07-03 | End: 2019-07-05 | Stop reason: HOSPADM

## 2019-07-03 RX ORDER — MORPHINE SULFATE 4 MG/ML
4 INJECTION, SOLUTION INTRAMUSCULAR; INTRAVENOUS
Status: DISCONTINUED | OUTPATIENT
Start: 2019-07-03 | End: 2019-07-05 | Stop reason: HOSPADM

## 2019-07-03 RX ORDER — ACETAMINOPHEN 325 MG/1
650 TABLET ORAL EVERY 4 HOURS PRN
Status: DISCONTINUED | OUTPATIENT
Start: 2019-07-03 | End: 2019-07-03

## 2019-07-03 RX ORDER — ATENOLOL 25 MG/1
25 TABLET ORAL DAILY
Status: DISCONTINUED | OUTPATIENT
Start: 2019-07-03 | End: 2019-07-05 | Stop reason: HOSPADM

## 2019-07-03 RX ORDER — ESTRADIOL 1 MG/1
0.5 TABLET ORAL DAILY
Status: DISCONTINUED | OUTPATIENT
Start: 2019-07-03 | End: 2019-07-05 | Stop reason: HOSPADM

## 2019-07-03 RX ORDER — ALLOPURINOL 100 MG/1
200 TABLET ORAL DAILY
Status: DISCONTINUED | OUTPATIENT
Start: 2019-07-03 | End: 2019-07-05 | Stop reason: HOSPADM

## 2019-07-03 RX ORDER — ALBUTEROL SULFATE 90 UG/1
2 AEROSOL, METERED RESPIRATORY (INHALATION) EVERY 6 HOURS PRN
Status: DISCONTINUED | OUTPATIENT
Start: 2019-07-03 | End: 2019-07-05 | Stop reason: HOSPADM

## 2019-07-03 RX ADMIN — MORPHINE SULFATE 2 MG: 2 INJECTION, SOLUTION INTRAMUSCULAR; INTRAVENOUS at 05:23

## 2019-07-03 RX ADMIN — MOMETASONE FUROATE AND FORMOTEROL FUMARATE DIHYDRATE 2 PUFF: 100; 5 AEROSOL RESPIRATORY (INHALATION) at 20:55

## 2019-07-03 RX ADMIN — ALLOPURINOL 200 MG: 100 TABLET ORAL at 11:54

## 2019-07-03 RX ADMIN — SODIUM CHLORIDE, POTASSIUM CHLORIDE, SODIUM LACTATE AND CALCIUM CHLORIDE 150 ML/HR: 600; 310; 30; 20 INJECTION, SOLUTION INTRAVENOUS at 08:30

## 2019-07-03 RX ADMIN — ASPIRIN 325 MG: 325 TABLET, DELAYED RELEASE ORAL at 11:54

## 2019-07-03 RX ADMIN — ONDANSETRON 4 MG: 2 INJECTION INTRAMUSCULAR; INTRAVENOUS at 11:54

## 2019-07-03 RX ADMIN — ONDANSETRON 4 MG: 2 INJECTION INTRAMUSCULAR; INTRAVENOUS at 19:04

## 2019-07-03 RX ADMIN — SODIUM CHLORIDE, POTASSIUM CHLORIDE, SODIUM LACTATE AND CALCIUM CHLORIDE 150 ML/HR: 600; 310; 30; 20 INJECTION, SOLUTION INTRAVENOUS at 01:32

## 2019-07-03 RX ADMIN — SODIUM CHLORIDE, POTASSIUM CHLORIDE, SODIUM LACTATE AND CALCIUM CHLORIDE 150 ML/HR: 600; 310; 30; 20 INJECTION, SOLUTION INTRAVENOUS at 22:11

## 2019-07-03 RX ADMIN — CEFTRIAXONE 1 G: 1 INJECTION, POWDER, FOR SOLUTION INTRAMUSCULAR; INTRAVENOUS at 22:11

## 2019-07-03 RX ADMIN — MORPHINE SULFATE 4 MG: 4 INJECTION INTRAVENOUS at 19:11

## 2019-07-03 RX ADMIN — POTASSIUM BICARBONATE 40 MEQ: 782 TABLET, EFFERVESCENT ORAL at 19:04

## 2019-07-03 RX ADMIN — MORPHINE SULFATE 2 MG: 2 INJECTION, SOLUTION INTRAMUSCULAR; INTRAVENOUS at 01:28

## 2019-07-03 RX ADMIN — SODIUM CHLORIDE, POTASSIUM CHLORIDE, SODIUM LACTATE AND CALCIUM CHLORIDE 150 ML/HR: 600; 310; 30; 20 INJECTION, SOLUTION INTRAVENOUS at 16:55

## 2019-07-03 RX ADMIN — MORPHINE SULFATE 4 MG: 4 INJECTION INTRAVENOUS at 11:54

## 2019-07-03 RX ADMIN — MOMETASONE FUROATE AND FORMOTEROL FUMARATE DIHYDRATE 2 PUFF: 100; 5 AEROSOL RESPIRATORY (INHALATION) at 12:07

## 2019-07-03 RX ADMIN — SUCRALFATE 1 G: 1 TABLET ORAL at 11:54

## 2019-07-03 RX ADMIN — MORPHINE SULFATE 2 MG: 2 INJECTION, SOLUTION INTRAMUSCULAR; INTRAVENOUS at 08:30

## 2019-07-03 RX ADMIN — ENOXAPARIN SODIUM 40 MG: 40 INJECTION SUBCUTANEOUS at 09:00

## 2019-07-03 ASSESSMENT — PAIN DESCRIPTION - ONSET
ONSET: ON-GOING

## 2019-07-03 ASSESSMENT — PAIN SCALES - GENERAL
PAINLEVEL_OUTOF10: 7
PAINLEVEL_OUTOF10: 7
PAINLEVEL_OUTOF10: 4
PAINLEVEL_OUTOF10: 4
PAINLEVEL_OUTOF10: 3
PAINLEVEL_OUTOF10: 10
PAINLEVEL_OUTOF10: 3
PAINLEVEL_OUTOF10: 3
PAINLEVEL_OUTOF10: 6
PAINLEVEL_OUTOF10: 4

## 2019-07-03 ASSESSMENT — PAIN DESCRIPTION - ORIENTATION
ORIENTATION: MID
ORIENTATION: MID;UPPER
ORIENTATION: MID
ORIENTATION: MID

## 2019-07-03 ASSESSMENT — PAIN DESCRIPTION - DESCRIPTORS
DESCRIPTORS: SHARP
DESCRIPTORS: SHARP
DESCRIPTORS: NAGGING;SHARP
DESCRIPTORS: NAGGING;SHARP
DESCRIPTORS: SHARP;RADIATING
DESCRIPTORS: SHARP

## 2019-07-03 ASSESSMENT — PAIN DESCRIPTION - LOCATION
LOCATION: ABDOMEN

## 2019-07-03 ASSESSMENT — PAIN DESCRIPTION - PAIN TYPE
TYPE: ACUTE PAIN

## 2019-07-03 ASSESSMENT — PAIN DESCRIPTION - PROGRESSION
CLINICAL_PROGRESSION: GRADUALLY IMPROVING
CLINICAL_PROGRESSION: GRADUALLY IMPROVING

## 2019-07-03 ASSESSMENT — PAIN DESCRIPTION - FREQUENCY
FREQUENCY: CONTINUOUS
FREQUENCY: INTERMITTENT
FREQUENCY: CONTINUOUS

## 2019-07-03 ASSESSMENT — PAIN DESCRIPTION - DIRECTION
RADIATING_TOWARDS: BACK

## 2019-07-03 NOTE — H&P
Hospital Medicine History & Physical      PCP: Almita Mccormick MD    Date of Admission: 7/2/2019    Date of Service: Pt seen/examined on 7/2/2019 and Admitted to Inpatient with expected LOS greater than two midnights due to medical therapy. Chief Complaint: Abdominal pain      History Of Present Illness:     75 y.o. female who presented to [facility] [place] [facility] AT [place] with abdominal pain. Patient developed acute midepigastric pain today. Pain radiated to her back. Patient described pain as sharp in character. She had a some associated nausea vomiting. Patient states she has been taking ibuprofen for the last several days after she had a dental abscess which was treated by her dentist  She denies alcohol consumption  Patient denies hematemesis or hematochezia    Past Medical History:          Diagnosis Date    Allergic rhinitis     Asthma     Bunion, right foot 12/2013    CAD (coronary artery disease) 6/7/2010    CAD (coronary artery disease)     Gout     Hammertoe 12/12/2013    Hyperlipidemia     Hypertension     IBS (irritable bowel syndrome) 6/7/2010    Obstructive chronic bronchitis with acute bronchitis ([facility] [address] 4/1/2019    Pneumonia 2010    Psoriasis        Past Surgical History:          Procedure Laterality Date    BRONCHOSCOPY N/A 4/23/2019    BRONCHOSCOPY WITH BRONCHOALVEOLAR LAVAGE performed by Janae Epperson MD at [facility]  4/23/2019    [address] ONLY performed by Janae Epperson MD at [address] [place] [address] [address] Right 12/12/2013    COLONOSCOPY  2009    neg.  COLONOSCOPY N/A 3/20/2019    COLONOSCOPY WITH BIOPSY performed by David Griffin MD at [address]  2010    left    HAMMER TOE SURGERY Right 12/12/2013       Medications Prior to Admission:      Prior to Admission medications    Medication Sig Start Date End Date Taking?  Authorizing Provider   doxycycline

## 2019-07-04 LAB
A/G RATIO: 0.9 (ref 1.1–2.2)
ALBUMIN SERPL-MCNC: 2.5 G/DL (ref 3.4–5)
ALP BLD-CCNC: 71 U/L (ref 40–129)
ALT SERPL-CCNC: 6 U/L (ref 10–40)
ANION GAP SERPL CALCULATED.3IONS-SCNC: 12 MMOL/L (ref 3–16)
AST SERPL-CCNC: 13 U/L (ref 15–37)
BASOPHILS ABSOLUTE: 0.1 K/UL (ref 0–0.2)
BASOPHILS RELATIVE PERCENT: 0.4 %
BILIRUB SERPL-MCNC: 0.3 MG/DL (ref 0–1)
BUN BLDV-MCNC: 38 MG/DL (ref 7–20)
CALCIUM SERPL-MCNC: 9 MG/DL (ref 8.3–10.6)
CHLORIDE BLD-SCNC: 105 MMOL/L (ref 99–110)
CO2: 22 MMOL/L (ref 21–32)
CREAT SERPL-MCNC: 1.1 MG/DL (ref 0.6–1.2)
EOSINOPHILS ABSOLUTE: 0.1 K/UL (ref 0–0.6)
EOSINOPHILS RELATIVE PERCENT: 0.7 %
GFR AFRICAN AMERICAN: 59
GFR NON-AFRICAN AMERICAN: 48
GLOBULIN: 2.9 G/DL
GLUCOSE BLD-MCNC: 85 MG/DL (ref 70–99)
HCT VFR BLD CALC: 29.6 % (ref 36–48)
HEMOGLOBIN: 9.7 G/DL (ref 12–16)
LIPASE: 281 U/L (ref 13–60)
LYMPHOCYTES ABSOLUTE: 2.1 K/UL (ref 1–5.1)
LYMPHOCYTES RELATIVE PERCENT: 11.5 %
MCH RBC QN AUTO: 28.6 PG (ref 26–34)
MCHC RBC AUTO-ENTMCNC: 32.7 G/DL (ref 31–36)
MCV RBC AUTO: 87.4 FL (ref 80–100)
MONOCYTES ABSOLUTE: 1.1 K/UL (ref 0–1.3)
MONOCYTES RELATIVE PERCENT: 6.3 %
NEUTROPHILS ABSOLUTE: 14.8 K/UL (ref 1.7–7.7)
NEUTROPHILS RELATIVE PERCENT: 81.1 %
PDW BLD-RTO: 17.5 % (ref 12.4–15.4)
PLATELET # BLD: 281 K/UL (ref 135–450)
PMV BLD AUTO: 8.2 FL (ref 5–10.5)
POTASSIUM SERPL-SCNC: 2.9 MMOL/L (ref 3.5–5.1)
POTASSIUM SERPL-SCNC: 3.7 MMOL/L (ref 3.5–5.1)
RBC # BLD: 3.39 M/UL (ref 4–5.2)
SODIUM BLD-SCNC: 139 MMOL/L (ref 136–145)
TOTAL PROTEIN: 5.4 G/DL (ref 6.4–8.2)
TRIGL SERPL-MCNC: 59 MG/DL (ref 0–150)
WBC # BLD: 18.3 K/UL (ref 4–11)

## 2019-07-04 PROCEDURE — 1200000000 HC SEMI PRIVATE

## 2019-07-04 PROCEDURE — 36415 COLL VENOUS BLD VENIPUNCTURE: CPT

## 2019-07-04 PROCEDURE — 6370000000 HC RX 637 (ALT 250 FOR IP): Performed by: INTERNAL MEDICINE

## 2019-07-04 PROCEDURE — 6360000002 HC RX W HCPCS: Performed by: INTERNAL MEDICINE

## 2019-07-04 PROCEDURE — 84132 ASSAY OF SERUM POTASSIUM: CPT

## 2019-07-04 PROCEDURE — 85025 COMPLETE CBC W/AUTO DIFF WBC: CPT

## 2019-07-04 PROCEDURE — 94640 AIRWAY INHALATION TREATMENT: CPT

## 2019-07-04 PROCEDURE — 84478 ASSAY OF TRIGLYCERIDES: CPT

## 2019-07-04 PROCEDURE — 2580000003 HC RX 258: Performed by: INTERNAL MEDICINE

## 2019-07-04 PROCEDURE — 83690 ASSAY OF LIPASE: CPT

## 2019-07-04 PROCEDURE — 94760 N-INVAS EAR/PLS OXIMETRY 1: CPT

## 2019-07-04 PROCEDURE — 94761 N-INVAS EAR/PLS OXIMETRY MLT: CPT

## 2019-07-04 PROCEDURE — 80053 COMPREHEN METABOLIC PANEL: CPT

## 2019-07-04 RX ORDER — POTASSIUM CHLORIDE 7.45 MG/ML
10 INJECTION INTRAVENOUS PRN
Status: DISCONTINUED | OUTPATIENT
Start: 2019-07-04 | End: 2019-07-05 | Stop reason: HOSPADM

## 2019-07-04 RX ADMIN — ASPIRIN 325 MG: 325 TABLET, DELAYED RELEASE ORAL at 09:00

## 2019-07-04 RX ADMIN — MORPHINE SULFATE 2 MG: 2 INJECTION, SOLUTION INTRAMUSCULAR; INTRAVENOUS at 14:35

## 2019-07-04 RX ADMIN — MOMETASONE FUROATE AND FORMOTEROL FUMARATE DIHYDRATE 2 PUFF: 100; 5 AEROSOL RESPIRATORY (INHALATION) at 09:29

## 2019-07-04 RX ADMIN — CEFTRIAXONE 1 G: 1 INJECTION, POWDER, FOR SOLUTION INTRAMUSCULAR; INTRAVENOUS at 21:00

## 2019-07-04 RX ADMIN — ENOXAPARIN SODIUM 40 MG: 40 INJECTION SUBCUTANEOUS at 09:00

## 2019-07-04 RX ADMIN — SODIUM CHLORIDE, POTASSIUM CHLORIDE, SODIUM LACTATE AND CALCIUM CHLORIDE 150 ML/HR: 600; 310; 30; 20 INJECTION, SOLUTION INTRAVENOUS at 06:39

## 2019-07-04 RX ADMIN — SODIUM CHLORIDE, POTASSIUM CHLORIDE, SODIUM LACTATE AND CALCIUM CHLORIDE 150 ML/HR: 600; 310; 30; 20 INJECTION, SOLUTION INTRAVENOUS at 21:01

## 2019-07-04 RX ADMIN — MORPHINE SULFATE 2 MG: 2 INJECTION, SOLUTION INTRAMUSCULAR; INTRAVENOUS at 09:00

## 2019-07-04 RX ADMIN — MOMETASONE FUROATE AND FORMOTEROL FUMARATE DIHYDRATE 2 PUFF: 100; 5 AEROSOL RESPIRATORY (INHALATION) at 20:30

## 2019-07-04 RX ADMIN — MORPHINE SULFATE 4 MG: 4 INJECTION INTRAVENOUS at 23:54

## 2019-07-04 RX ADMIN — Medication 10 MEQ: at 14:00

## 2019-07-04 RX ADMIN — ESTRADIOL 0.5 MG: 1 TABLET ORAL at 09:06

## 2019-07-04 RX ADMIN — Medication 10 MEQ: at 11:34

## 2019-07-04 RX ADMIN — Medication 10 MEQ: at 09:54

## 2019-07-04 RX ADMIN — ALLOPURINOL 200 MG: 100 TABLET ORAL at 09:00

## 2019-07-04 RX ADMIN — MORPHINE SULFATE 4 MG: 4 INJECTION INTRAVENOUS at 17:49

## 2019-07-04 RX ADMIN — Medication 10 MEQ: at 14:51

## 2019-07-04 RX ADMIN — Medication 10 MEQ: at 12:36

## 2019-07-04 RX ADMIN — Medication 10 MEQ: at 08:21

## 2019-07-04 ASSESSMENT — PAIN SCALES - GENERAL
PAINLEVEL_OUTOF10: 3
PAINLEVEL_OUTOF10: 0
PAINLEVEL_OUTOF10: 6
PAINLEVEL_OUTOF10: 9
PAINLEVEL_OUTOF10: 6
PAINLEVEL_OUTOF10: 5
PAINLEVEL_OUTOF10: 5
PAINLEVEL_OUTOF10: 0
PAINLEVEL_OUTOF10: 7

## 2019-07-04 ASSESSMENT — PAIN DESCRIPTION - ONSET
ONSET: ON-GOING
ONSET: ON-GOING

## 2019-07-04 ASSESSMENT — PAIN DESCRIPTION - LOCATION
LOCATION: ABDOMEN

## 2019-07-04 ASSESSMENT — PAIN DESCRIPTION - DESCRIPTORS
DESCRIPTORS: SHARP
DESCRIPTORS: DISCOMFORT;SHARP

## 2019-07-04 ASSESSMENT — PAIN DESCRIPTION - PAIN TYPE
TYPE: ACUTE PAIN

## 2019-07-04 ASSESSMENT — PAIN DESCRIPTION - PROGRESSION
CLINICAL_PROGRESSION: GRADUALLY IMPROVING
CLINICAL_PROGRESSION: GRADUALLY IMPROVING

## 2019-07-04 ASSESSMENT — PAIN DESCRIPTION - FREQUENCY
FREQUENCY: CONTINUOUS
FREQUENCY: CONTINUOUS

## 2019-07-04 ASSESSMENT — PAIN DESCRIPTION - ORIENTATION
ORIENTATION: MID;UPPER
ORIENTATION: MID;UPPER

## 2019-07-05 VITALS
SYSTOLIC BLOOD PRESSURE: 96 MMHG | DIASTOLIC BLOOD PRESSURE: 60 MMHG | TEMPERATURE: 98.5 F | OXYGEN SATURATION: 93 % | HEART RATE: 78 BPM | BODY MASS INDEX: 26.37 KG/M2 | HEIGHT: 63 IN | RESPIRATION RATE: 16 BRPM | WEIGHT: 148.81 LBS

## 2019-07-05 LAB — LIPASE: 133 U/L (ref 13–60)

## 2019-07-05 PROCEDURE — 36415 COLL VENOUS BLD VENIPUNCTURE: CPT

## 2019-07-05 PROCEDURE — 6360000002 HC RX W HCPCS: Performed by: INTERNAL MEDICINE

## 2019-07-05 PROCEDURE — 94760 N-INVAS EAR/PLS OXIMETRY 1: CPT

## 2019-07-05 PROCEDURE — 6370000000 HC RX 637 (ALT 250 FOR IP): Performed by: INTERNAL MEDICINE

## 2019-07-05 PROCEDURE — 2580000003 HC RX 258: Performed by: INTERNAL MEDICINE

## 2019-07-05 PROCEDURE — 83690 ASSAY OF LIPASE: CPT

## 2019-07-05 RX ORDER — TRAMADOL HYDROCHLORIDE 50 MG/1
50 TABLET ORAL EVERY 6 HOURS PRN
Status: DISCONTINUED | OUTPATIENT
Start: 2019-07-05 | End: 2019-07-05 | Stop reason: HOSPADM

## 2019-07-05 RX ADMIN — ESTRADIOL 0.5 MG: 1 TABLET ORAL at 08:14

## 2019-07-05 RX ADMIN — MORPHINE SULFATE 4 MG: 4 INJECTION INTRAVENOUS at 04:26

## 2019-07-05 RX ADMIN — MOMETASONE FUROATE AND FORMOTEROL FUMARATE DIHYDRATE 2 PUFF: 100; 5 AEROSOL RESPIRATORY (INHALATION) at 10:12

## 2019-07-05 RX ADMIN — ASPIRIN 325 MG: 325 TABLET, DELAYED RELEASE ORAL at 08:10

## 2019-07-05 RX ADMIN — CEFTRIAXONE 1 G: 1 INJECTION, POWDER, FOR SOLUTION INTRAMUSCULAR; INTRAVENOUS at 16:08

## 2019-07-05 RX ADMIN — MEDROXYPROGESTERONE ACETATE 2.5 MG: 2.5 TABLET ORAL at 08:14

## 2019-07-05 RX ADMIN — TRAMADOL HYDROCHLORIDE 50 MG: 50 TABLET, FILM COATED ORAL at 17:12

## 2019-07-05 RX ADMIN — MORPHINE SULFATE 2 MG: 2 INJECTION, SOLUTION INTRAMUSCULAR; INTRAVENOUS at 08:17

## 2019-07-05 RX ADMIN — SODIUM CHLORIDE, POTASSIUM CHLORIDE, SODIUM LACTATE AND CALCIUM CHLORIDE 150 ML/HR: 600; 310; 30; 20 INJECTION, SOLUTION INTRAVENOUS at 10:52

## 2019-07-05 RX ADMIN — ENOXAPARIN SODIUM 40 MG: 40 INJECTION SUBCUTANEOUS at 08:10

## 2019-07-05 RX ADMIN — SODIUM CHLORIDE, POTASSIUM CHLORIDE, SODIUM LACTATE AND CALCIUM CHLORIDE 150 ML/HR: 600; 310; 30; 20 INJECTION, SOLUTION INTRAVENOUS at 04:20

## 2019-07-05 RX ADMIN — ALLOPURINOL 200 MG: 100 TABLET ORAL at 08:10

## 2019-07-05 ASSESSMENT — PAIN DESCRIPTION - ORIENTATION
ORIENTATION: MID;UPPER

## 2019-07-05 ASSESSMENT — PAIN DESCRIPTION - DESCRIPTORS: DESCRIPTORS: SHARP

## 2019-07-05 ASSESSMENT — PAIN DESCRIPTION - ONSET
ONSET: GRADUAL
ONSET: ON-GOING
ONSET: ON-GOING

## 2019-07-05 ASSESSMENT — PAIN DESCRIPTION - FREQUENCY
FREQUENCY: CONTINUOUS
FREQUENCY: INTERMITTENT
FREQUENCY: CONTINUOUS

## 2019-07-05 ASSESSMENT — PAIN SCALES - GENERAL
PAINLEVEL_OUTOF10: 1
PAINLEVEL_OUTOF10: 4
PAINLEVEL_OUTOF10: 1
PAINLEVEL_OUTOF10: 7
PAINLEVEL_OUTOF10: 1
PAINLEVEL_OUTOF10: 7
PAINLEVEL_OUTOF10: 4

## 2019-07-05 ASSESSMENT — PAIN DESCRIPTION - LOCATION
LOCATION: ABDOMEN

## 2019-07-05 ASSESSMENT — PAIN DESCRIPTION - PAIN TYPE
TYPE: ACUTE PAIN

## 2019-07-05 ASSESSMENT — PAIN DESCRIPTION - PROGRESSION
CLINICAL_PROGRESSION: GRADUALLY IMPROVING

## 2019-07-05 NOTE — DISCHARGE SUMMARY
Hospital Medicine Discharge Summary    Patient ID: Makenzie Webb      Patient's PCP: Riccardo Thurston MD    Admit Date: 7/2/2019     Discharge Date:   07/05/2019    Admitting Physician: Tavo Finch MD     Discharge Physician: Terrance You MD     Discharge Diagnoses: Active Hospital Problems    Diagnosis    Acute pancreatitis [K85.90]       The patient was seen and examined on day of discharge and this discharge summary is in conjunction with any daily progress note from day of discharge. Hospital Course:     From HPI:\"73 y.o. female who presented to [facility] [place] with abdominal pain. Patient developed acute midepigastric pain today. Pain radiated to her back. Patient described pain as sharp in character. She had a some associated nausea vomiting. Patient states she has been taking ibuprofen for the last several days after she had a dental abscess which was treated by her dentist  She denies alcohol consumption  Patient denies hematemesis or hematochezia\"    1. Acute pancreatitis, iv fluids, pain management, tolerating CLD, will advance to low fat diet, and if tolerated and ok with GI can go home, discussed with patient. We will keep diuretics on hold. 2. UTI, iv antibiotics, last dose today. 3. Lung changes, likely subacute or chronic ,she is following up with pulmonary as outpatient   4. Asthma, stable continue Symbicort and albuterol  5. Hypertension, continue po medications   6. GERD, carafate. 7. SAIDA on admission, improved with iv fluids              Physical Exam Performed:     /84   Pulse 73   Temp 98.2 °F (36.8 °C)   Resp 16   Ht 5' 3\" (1.6 m)   Wt 148 lb 13 oz (67.5 kg)   SpO2 94%   BMI 26.36 kg/m²       General appearance:  No apparent distress  HEENT:  Normal cephalic  Neck: Supple  Respiratory:  Normal respiratory effort. Clear to auscultation, bilaterally without Rales/Wheezes/Rhonchi.   Cardiovascular:  Regular rate and rhythm with normal S1/S2 without murmurs, rubs or gallops. Abdomen: Soft, minimally tender   Musculoskeletal:  No clubbing, cyanosis. Skin: Skin color, texture, turgor normal.  No rashes or lesions. Neurologic:  No focal weakness   Psychiatric:  Alert and oriented  Capillary Refill: Brisk,< 3 seconds   Peripheral Pulses: +2 palpable, equal bilaterally       Labs: For convenience and continuity at follow-up the following most recent labs are provided:      CBC:    Lab Results   Component Value Date    WBC 18.3 07/04/2019    HGB 9.7 07/04/2019    HCT 29.6 07/04/2019     07/04/2019       Renal:    Lab Results   Component Value Date     07/04/2019    K 3.7 07/04/2019    K 3.8 07/02/2019     07/04/2019    CO2 22 07/04/2019    BUN 38 07/04/2019    CREATININE 1.1 07/04/2019    CALCIUM 9.0 07/04/2019    PHOS 1.9 04/25/2019         Significant Diagnostic Studies    Radiology:   US GALLBLADDER RUQ   Final Result   Unremarkable right upper quadrant ultrasound. CT ABDOMEN PELVIS WO CONTRAST Additional Contrast? None   Final Result   Bibasilar punctate centrilobular nodules suggesting infectious pneumonitis or   small airways disease. Acute pancreatitis. Consults:     IP CONSULT TO HOSPITALIST  IP CONSULT TO GI  IP CONSULT TO GI    Disposition:  home     Condition at Discharge: Stable    Discharge Instructions/Follow-up:  PCP    Code Status:  Full Code     Activity: activity as tolerated    Diet: low fat, low cholesterol diet      Discharge Medications:     Current Discharge Medication List           Details   doxycycline monohydrate (MONODOX) 100 MG capsule Take 100 mg by mouth 2 times daily  Refills: 0      traMADol (ULTRAM) 50 MG tablet Take 1 tablet by mouth every 6 hours as needed for Pain for up to 7 days.   Qty: 28 tablet, Refills: 0    Comments: Reduce doses taken as pain becomes manageable  Associated Diagnoses: Acute superficial gastritis without hemorrhage      budesonide-formoterol (SYMBICORT) 160-4.5 MCG/ACT

## 2019-07-05 NOTE — PROGRESS NOTES
Medication Reconciliation     List of medications patient is currently taking is complete. Source of information: 1. Conversation with patient at bedside                                      2. EPIC records     Allergies  Acetaminophen and Amoxicillin-pot clavulanate [amoxicillin-pot clavulanate]     Notes regarding home medications:  1. Patient has been unable to keep any medications down since last night    2. Currently taking Doxycycline 100 mg BID x 10 days for an abscessed tooth. Today as supposed to be last day    3.  Prescribed sucralfate yesterday, was not able to start this morning due to illness        Lesli Kendall, Pharmacy Intern  7/2/2019 9:32 PM
Pt expresses she now feels comfortable to go home. Advised pt to drink plenty of fluids at home and if not tolerating low fat diet, downgrade to clears, if pain persists go to ED.
Vnae MARY    Afebrile  On a low fat diet with no pain  Lipase 133    REC:    OK for discharge  She will stop her HCTZ-containing diuretic  I will see her in the office in 2 weeks
cm)   · Current Body Wt: 147 lb (66.7 kg)  · Usual Body Wt: (150-155 lb)  · Ideal Body Wt: 115 lb (52.2 kg),   · BMI Classification: BMI 25.0 - 29.9 Overweight    Nutrition Interventions:   Continue NPO(start nutrition when appropriate )  Continued Inpatient Monitoring    Nutrition Evaluation:   · Evaluation: Goals set   · Goals:  Tolerate most appropriate  nutrition therapy     · Monitoring: Nutrition Progression, Diet Tolerance, Weight, Pertinent Labs, Nausea or Vomiting      Electronically signed by Abby Robles RD, LD on 7/3/19 at 12:16 PM    Contact Number: [phone]

## 2019-07-06 ENCOUNTER — CARE COORDINATION (OUTPATIENT)
Dept: CASE MANAGEMENT | Age: 74
End: 2019-07-06

## 2019-07-06 DIAGNOSIS — M10.9 GOUT, ARTHRITIS: Primary | ICD-10-CM

## 2019-07-06 PROCEDURE — 1111F DSCHRG MED/CURRENT MED MERGE: CPT | Performed by: INTERNAL MEDICINE

## 2019-07-09 ENCOUNTER — OFFICE VISIT (OUTPATIENT)
Dept: INTERNAL MEDICINE CLINIC | Age: 74
End: 2019-07-09
Payer: MEDICARE

## 2019-07-09 VITALS
TEMPERATURE: 99.5 F | DIASTOLIC BLOOD PRESSURE: 54 MMHG | SYSTOLIC BLOOD PRESSURE: 108 MMHG | WEIGHT: 158.5 LBS | OXYGEN SATURATION: 90 % | BODY MASS INDEX: 28.08 KG/M2 | HEART RATE: 79 BPM

## 2019-07-09 DIAGNOSIS — R93.89 ABNORMAL CT OF THE CHEST: ICD-10-CM

## 2019-07-09 DIAGNOSIS — K85.80 OTHER ACUTE PANCREATITIS WITHOUT INFECTION OR NECROSIS: Primary | ICD-10-CM

## 2019-07-09 LAB
HCT VFR BLD CALC: 30.6 % (ref 36–48)
HEMOGLOBIN: 10.1 G/DL (ref 12–16)
MCH RBC QN AUTO: 29.2 PG (ref 26–34)
MCHC RBC AUTO-ENTMCNC: 33 G/DL (ref 31–36)
MCV RBC AUTO: 88.4 FL (ref 80–100)
PDW BLD-RTO: 17.7 % (ref 12.4–15.4)
PLATELET # BLD: 376 K/UL (ref 135–450)
PMV BLD AUTO: 7.7 FL (ref 5–10.5)
RBC # BLD: 3.46 M/UL (ref 4–5.2)
WBC # BLD: 10.8 K/UL (ref 4–11)

## 2019-07-09 PROCEDURE — G8598 ASA/ANTIPLAT THER USED: HCPCS | Performed by: INTERNAL MEDICINE

## 2019-07-09 PROCEDURE — 3017F COLORECTAL CA SCREEN DOC REV: CPT | Performed by: INTERNAL MEDICINE

## 2019-07-09 PROCEDURE — 1123F ACP DISCUSS/DSCN MKR DOCD: CPT | Performed by: INTERNAL MEDICINE

## 2019-07-09 PROCEDURE — G8419 CALC BMI OUT NRM PARAM NOF/U: HCPCS | Performed by: INTERNAL MEDICINE

## 2019-07-09 PROCEDURE — 1036F TOBACCO NON-USER: CPT | Performed by: INTERNAL MEDICINE

## 2019-07-09 PROCEDURE — 1090F PRES/ABSN URINE INCON ASSESS: CPT | Performed by: INTERNAL MEDICINE

## 2019-07-09 PROCEDURE — 36415 COLL VENOUS BLD VENIPUNCTURE: CPT | Performed by: INTERNAL MEDICINE

## 2019-07-09 PROCEDURE — 99214 OFFICE O/P EST MOD 30 MIN: CPT | Performed by: INTERNAL MEDICINE

## 2019-07-09 PROCEDURE — G8427 DOCREV CUR MEDS BY ELIG CLIN: HCPCS | Performed by: INTERNAL MEDICINE

## 2019-07-09 PROCEDURE — 4040F PNEUMOC VAC/ADMIN/RCVD: CPT | Performed by: INTERNAL MEDICINE

## 2019-07-09 PROCEDURE — 1111F DSCHRG MED/CURRENT MED MERGE: CPT | Performed by: INTERNAL MEDICINE

## 2019-07-09 PROCEDURE — G8399 PT W/DXA RESULTS DOCUMENT: HCPCS | Performed by: INTERNAL MEDICINE

## 2019-07-09 RX ORDER — AZITHROMYCIN 250 MG/1
TABLET, FILM COATED ORAL
Qty: 6 TABLET | Refills: 0 | Status: SHIPPED | OUTPATIENT
Start: 2019-07-09 | End: 2019-07-19

## 2019-07-10 LAB — LIPASE: 921 U/L (ref 13–60)

## 2019-07-11 LAB
ORGANISM: ABNORMAL
URINE CULTURE, ROUTINE: ABNORMAL
URINE CULTURE, ROUTINE: ABNORMAL

## 2019-07-11 ASSESSMENT — ENCOUNTER SYMPTOMS
CHEST TIGHTNESS: 0
COUGH: 0
DIARRHEA: 0
ABDOMINAL PAIN: 1

## 2019-07-13 ENCOUNTER — HOSPITAL ENCOUNTER (OUTPATIENT)
Dept: CT IMAGING | Age: 74
Discharge: HOME | End: 2019-07-13
Payer: MEDICARE

## 2019-07-13 DIAGNOSIS — K85.00 IDIOPATHIC ACUTE PANCREATITIS WITHOUT INFECTION OR NECROSIS: ICD-10-CM

## 2019-07-13 DIAGNOSIS — R74.8 ELEVATED LIPASE: ICD-10-CM

## 2019-07-13 DIAGNOSIS — R50.9 FEVER OF UNKNOWN ORIGIN: ICD-10-CM

## 2019-07-13 PROCEDURE — 6360000004 HC RX CONTRAST MEDICATION: Performed by: INTERNAL MEDICINE

## 2019-07-13 PROCEDURE — 74177 CT ABD & PELVIS W/CONTRAST: CPT

## 2019-07-13 RX ADMIN — IOPAMIDOL 75 ML: 755 INJECTION, SOLUTION INTRAVENOUS at 09:33

## 2019-07-13 RX ADMIN — IOHEXOL 50 ML: 240 INJECTION, SOLUTION INTRATHECAL; INTRAVASCULAR; INTRAVENOUS; ORAL at 09:33

## 2019-07-16 ENCOUNTER — CARE COORDINATION (OUTPATIENT)
Dept: CASE MANAGEMENT | Age: 74
End: 2019-07-16

## 2019-07-22 ENCOUNTER — CARE COORDINATION (OUTPATIENT)
Dept: CASE MANAGEMENT | Age: 74
End: 2019-07-22

## 2019-07-22 ENCOUNTER — OFFICE VISIT (OUTPATIENT)
Dept: INTERNAL MEDICINE CLINIC | Age: 74
End: 2019-07-22
Payer: MEDICARE

## 2019-07-22 VITALS
WEIGHT: 152.13 LBS | DIASTOLIC BLOOD PRESSURE: 80 MMHG | BODY MASS INDEX: 26.95 KG/M2 | TEMPERATURE: 97.8 F | HEART RATE: 68 BPM | OXYGEN SATURATION: 90 % | SYSTOLIC BLOOD PRESSURE: 134 MMHG

## 2019-07-22 DIAGNOSIS — R10.13 DYSPEPSIA: ICD-10-CM

## 2019-07-22 DIAGNOSIS — D64.9 ANEMIA, UNSPECIFIED TYPE: ICD-10-CM

## 2019-07-22 DIAGNOSIS — K85.80 OTHER ACUTE PANCREATITIS WITHOUT INFECTION OR NECROSIS: ICD-10-CM

## 2019-07-22 DIAGNOSIS — R93.89 ABNORMAL CT OF THE CHEST: ICD-10-CM

## 2019-07-22 LAB
HCT VFR BLD CALC: 32.6 % (ref 36–48)
HEMOGLOBIN: 10.3 G/DL (ref 12–16)
MCH RBC QN AUTO: 28 PG (ref 26–34)
MCHC RBC AUTO-ENTMCNC: 31.7 G/DL (ref 31–36)
MCV RBC AUTO: 88.3 FL (ref 80–100)
PDW BLD-RTO: 18.1 % (ref 12.4–15.4)
PLATELET # BLD: 548 K/UL (ref 135–450)
PMV BLD AUTO: 8.4 FL (ref 5–10.5)
RBC # BLD: 3.69 M/UL (ref 4–5.2)
WBC # BLD: 9.1 K/UL (ref 4–11)

## 2019-07-22 PROCEDURE — 1123F ACP DISCUSS/DSCN MKR DOCD: CPT | Performed by: INTERNAL MEDICINE

## 2019-07-22 PROCEDURE — G8427 DOCREV CUR MEDS BY ELIG CLIN: HCPCS | Performed by: INTERNAL MEDICINE

## 2019-07-22 PROCEDURE — 1090F PRES/ABSN URINE INCON ASSESS: CPT | Performed by: INTERNAL MEDICINE

## 2019-07-22 PROCEDURE — G8399 PT W/DXA RESULTS DOCUMENT: HCPCS | Performed by: INTERNAL MEDICINE

## 2019-07-22 PROCEDURE — 1111F DSCHRG MED/CURRENT MED MERGE: CPT | Performed by: INTERNAL MEDICINE

## 2019-07-22 PROCEDURE — G8598 ASA/ANTIPLAT THER USED: HCPCS | Performed by: INTERNAL MEDICINE

## 2019-07-22 PROCEDURE — 99214 OFFICE O/P EST MOD 30 MIN: CPT | Performed by: INTERNAL MEDICINE

## 2019-07-22 PROCEDURE — 1036F TOBACCO NON-USER: CPT | Performed by: INTERNAL MEDICINE

## 2019-07-22 PROCEDURE — 36415 COLL VENOUS BLD VENIPUNCTURE: CPT | Performed by: INTERNAL MEDICINE

## 2019-07-22 PROCEDURE — G8419 CALC BMI OUT NRM PARAM NOF/U: HCPCS | Performed by: INTERNAL MEDICINE

## 2019-07-22 PROCEDURE — 4040F PNEUMOC VAC/ADMIN/RCVD: CPT | Performed by: INTERNAL MEDICINE

## 2019-07-22 PROCEDURE — 3017F COLORECTAL CA SCREEN DOC REV: CPT | Performed by: INTERNAL MEDICINE

## 2019-07-22 RX ORDER — TORSEMIDE 5 MG/1
5 TABLET ORAL DAILY
COMMUNITY

## 2019-07-22 ASSESSMENT — ENCOUNTER SYMPTOMS
ABDOMINAL DISTENTION: 0
NAUSEA: 0
ABDOMINAL PAIN: 0

## 2019-07-22 NOTE — PROGRESS NOTES
Disp: 30 tablet, Rfl: 0  medroxyPROGESTERone (PROVERA) 2.5 MG tablet, Take 2.5 mg by mouth every other day , Disp: , Rfl:   estradiol (ESTRACE) 0.5 MG tablet, Take 0.5 mg by mouth daily. , Disp: , Rfl:   atorvastatin (LIPITOR) 40 MG tablet, Take 40 mg by mouth daily , Disp: , Rfl:   Loratadine (CLARITIN PO), Take 10 mg by mouth daily. , Disp: , Rfl:   atenolol (TENORMIN) 25 MG tablet, Take 25 mg by mouth daily. , Disp: , Rfl:   torsemide (DEMADEX) 5 MG tablet, Take 5 mg by mouth, Disp: , Rfl:   sucralfate (CARAFATE) 1 GM tablet, Take 1 tablet by mouth 4 times daily (Patient not taking: Reported on 7/22/2019), Disp: 120 tablet, Rfl: 3    No current facility-administered medications for this visit. Allergies: Tramadol; Acetaminophen; and Amoxicillin-pot clavulanate (amoxicillin-pot clavulanate)  Past Medical History:  No date: Allergic rhinitis  No date: Asthma  12/2013: Bunion, right foot  6/7/2010: CAD (coronary artery disease)  No date: CAD (coronary artery disease)  No date: Gout  12/12/2013: Hammertoe  No date: Hyperlipidemia  No date: Hypertension  6/7/2010: IBS (irritable bowel syndrome)  4/1/2019: Obstructive chronic bronchitis with acute bronchitis ([facility] [address]  2010: Pneumonia  No date: Psoriasis  Past Surgical History:  4/23/2019: BRONCHOSCOPY; N/A      Comment:  BRONCHOSCOPY WITH BRONCHOALVEOLAR LAVAGE performed by                [facility]  4/23/2019: [address] ONLY performed by                Jose J Huff MD at [facility]  12/12/Robert Galdamez; Right  2009: COLONOSCOPY      Comment:  neg.  3/20/2019: COLONOSCOPY; N/A      Comment:  COLONOSCOPY WITH BIOPSY performed by Earnest Nuñez MD               at [facility] [place]  No date: [address]:  1998 2010: EYE SURGERY      Comment:  left  12/12/2013: HAMMER TOE SURGERY;  Right  Review of patient's family history indicates:  Problem: Musculoskeletal: Normal range of motion. She exhibits no edema or tenderness. Lymphadenopathy:     She has no cervical adenopathy. Neurological: She is alert and oriented to person, place, and time. She has normal reflexes. She displays normal reflexes. No cranial nerve deficit. She exhibits normal muscle tone. Coordination normal.   Skin: Skin is warm and dry. No rash noted. She is not diaphoretic. No erythema. No pallor. Psychiatric: She has a normal mood and affect. Her behavior is normal. Judgment and thought content normal.       Assessment:      Encounter Diagnoses   Name Primary?  Other acute pancreatitis without infection or necrosis     Abnormal CT of the chest     Anemia, unspecified type     Dyspepsia            Plan:      Barbie Ontiveros was seen today for follow-up from hospital.    Diagnoses and all orders for this visit:    Other acute pancreatitis without infection or necrosis  -     CBC  -     Cancel: LIPASE; Future  -     Cancel: AMYLASE;  Future  -     AMYLASE  -     LIPASE    Abnormal CT of the chest    Anemia, unspecified type    Dyspepsia              Madelin Healy MD

## 2019-07-23 ENCOUNTER — TELEPHONE (OUTPATIENT)
Dept: INTERNAL MEDICINE CLINIC | Age: 74
End: 2019-07-23

## 2019-07-23 LAB
AMYLASE: 302 U/L (ref 25–115)
LIPASE: 471 U/L (ref 13–60)

## 2019-08-01 ENCOUNTER — OFFICE VISIT (OUTPATIENT)
Dept: PULMONOLOGY | Age: 74
End: 2019-08-01
Payer: MEDICARE

## 2019-08-01 VITALS
HEIGHT: 63 IN | BODY MASS INDEX: 26.58 KG/M2 | DIASTOLIC BLOOD PRESSURE: 68 MMHG | TEMPERATURE: 98.2 F | WEIGHT: 150 LBS | OXYGEN SATURATION: 96 % | RESPIRATION RATE: 16 BRPM | SYSTOLIC BLOOD PRESSURE: 138 MMHG | HEART RATE: 68 BPM

## 2019-08-01 DIAGNOSIS — J43.1 PANLOBULAR EMPHYSEMA (HCC): ICD-10-CM

## 2019-08-01 DIAGNOSIS — Z87.01 HISTORY OF PNEUMONIA, RECURRENT: Primary | ICD-10-CM

## 2019-08-01 PROBLEM — J43.9 PULMONARY EMPHYSEMA (HCC): Status: ACTIVE | Noted: 2019-08-01

## 2019-08-01 PROCEDURE — 1111F DSCHRG MED/CURRENT MED MERGE: CPT | Performed by: INTERNAL MEDICINE

## 2019-08-01 PROCEDURE — 1123F ACP DISCUSS/DSCN MKR DOCD: CPT | Performed by: INTERNAL MEDICINE

## 2019-08-01 PROCEDURE — 1090F PRES/ABSN URINE INCON ASSESS: CPT | Performed by: INTERNAL MEDICINE

## 2019-08-01 PROCEDURE — 3023F SPIROM DOC REV: CPT | Performed by: INTERNAL MEDICINE

## 2019-08-01 PROCEDURE — 99213 OFFICE O/P EST LOW 20 MIN: CPT | Performed by: INTERNAL MEDICINE

## 2019-08-01 PROCEDURE — G8427 DOCREV CUR MEDS BY ELIG CLIN: HCPCS | Performed by: INTERNAL MEDICINE

## 2019-08-01 PROCEDURE — G8926 SPIRO NO PERF OR DOC: HCPCS | Performed by: INTERNAL MEDICINE

## 2019-08-01 PROCEDURE — 4040F PNEUMOC VAC/ADMIN/RCVD: CPT | Performed by: INTERNAL MEDICINE

## 2019-08-01 PROCEDURE — 1036F TOBACCO NON-USER: CPT | Performed by: INTERNAL MEDICINE

## 2019-08-01 PROCEDURE — G8598 ASA/ANTIPLAT THER USED: HCPCS | Performed by: INTERNAL MEDICINE

## 2019-08-01 PROCEDURE — 3017F COLORECTAL CA SCREEN DOC REV: CPT | Performed by: INTERNAL MEDICINE

## 2019-08-01 PROCEDURE — G8419 CALC BMI OUT NRM PARAM NOF/U: HCPCS | Performed by: INTERNAL MEDICINE

## 2019-08-01 PROCEDURE — G8399 PT W/DXA RESULTS DOCUMENT: HCPCS | Performed by: INTERNAL MEDICINE

## 2019-08-01 NOTE — PROGRESS NOTES
lungs every 6 hours as needed for Wheezing 18 Inhaler 1    budesonide-formoterol (SYMBICORT) 160-4.5 MCG/ACT AERO Inhale 2 puffs into the lungs 2 times daily Rinse mouth after use. 1 Inhaler 5    aspirin 325 MG EC tablet Take 325 mg by mouth daily       allopurinol (ZYLOPRIM) 300 MG tablet TAKE 1 TABLET EVERY DAY 90 tablet 3    clobetasol (TEMOVATE) 0.05 % cream Apply topically 2 times daily 60 g 1    losartan (COZAAR) 50 MG tablet Take 0.5 tablets by mouth daily 30 tablet 0    estradiol (ESTRACE) 0.5 MG tablet Take 0.5 mg by mouth daily.  atorvastatin (LIPITOR) 40 MG tablet Take 40 mg by mouth daily       Loratadine (CLARITIN PO) Take 10 mg by mouth daily.  atenolol (TENORMIN) 25 MG tablet Take 25 mg by mouth daily.  torsemide (DEMADEX) 5 MG tablet Take 5 mg by mouth      sucralfate (CARAFATE) 1 GM tablet Take 1 tablet by mouth 4 times daily (Patient not taking: Reported on 7/22/2019) 120 tablet 3    medroxyPROGESTERone (PROVERA) 2.5 MG tablet Take 2.5 mg by mouth every other day        No current facility-administered medications for this visit. Data Reviewed:   CBC and Renal reviewed  Last CBC  Lab Results   Component Value Date    WBC 9.1 07/22/2019    RBC 3.69 07/22/2019    HGB 10.3 07/22/2019    MCV 88.3 07/22/2019     07/22/2019     Last Renal  Lab Results   Component Value Date     07/04/2019    K 3.7 07/04/2019    K 3.8 07/02/2019     07/04/2019    CO2 22 07/04/2019    CO2 22 07/03/2019    CO2 17 07/02/2019    BUN 38 07/04/2019    CREATININE 1.1 07/04/2019    GLUCOSE 85 07/04/2019    CALCIUM 9.0 07/04/2019       Last ABG  POC Blood Gas: No results found for: POCPH, POCPCO2, POCPO2, POCHCO3, NBEA, YPWP2HCI  No results for input(s): PH, PCO2, PO2, HCO3, BE, O2SAT in the last 72 hours. Radiology Review:  Pertinent images / reports were reviewed as a part of this visit. CT Chest w/ contrast: No results found for this or any previous visit.     CT Chest

## 2019-08-09 ENCOUNTER — HOSPITAL ENCOUNTER (OUTPATIENT)
Dept: SPEECH THERAPY | Age: 74
Setting detail: THERAPIES SERIES
Discharge: HOME | End: 2019-08-09
Payer: MEDICARE

## 2019-08-09 ENCOUNTER — HOSPITAL ENCOUNTER (OUTPATIENT)
Dept: GENERAL RADIOLOGY | Age: 74
Discharge: HOME | End: 2019-08-09
Payer: MEDICARE

## 2019-08-09 DIAGNOSIS — Z87.01 HISTORY OF RECURRENT PNEUMONIA: ICD-10-CM

## 2019-08-09 DIAGNOSIS — Z87.01 HISTORY OF PNEUMONIA, RECURRENT: ICD-10-CM

## 2019-08-09 PROCEDURE — 92611 MOTION FLUOROSCOPY/SWALLOW: CPT

## 2019-08-09 PROCEDURE — 74230 X-RAY XM SWLNG FUNCJ C+: CPT

## 2019-08-09 NOTE — PROCEDURES
FAMILY / STAFF COMPLAINTS:    persistent pneumonia; pt self-reports occasional choking on liquid when eating, but not in isolation. No difficulty with solids      Diet prior to study:   Current Diet Solid Consistency: Regular  Current Diet Liquid Consistency: Thin    Objective: Impressions and Recommendations     IMPRESSIONS:    1. Behavior/Cognition:  Pt alert, oriented to procedure, able to provide detailed background and history of problem. Comprehension and expression WFLs. 2.Dysphagia Diagnosis:   Trace Oral Dysphagia characterized by mildly decreased AP movement resulting in intermittent premature spillage over tongue base, at times to valleculae, with all consistencies; Mild postlingual residue with puree, soft, mech soft, and regular solids, and thin liquids which spills over tongue base intermittently to valleculae after the swallow which pt independently clears with dry swallow    Pharyngeal: Minimal to mild vallecular residue is related to oral phase; throat clear noted x1 as pt appeared to sensate vallecular residue with thin liquid, followed by reflexive repeat swallow. NO penetration or aspiration with consecutive boluses of thin liquid via cup and straw. Dysphagia Score: Dysphagia Outcome Severity Scale: Level 6: Within functional limits/Modified independence    Aspiration/Penetration Risk:   Penetration-Aspiration Scale (PAS): 1 - Material does not enter the airway    Diet Recommendations:  Solid consistency: Regular   Liquid consistency:  Thin   Medication administration: PO     Compensatory Swallow Strategies:   Compensatory Swallowing Strategies: Upright as possible for all oral intake, Remain upright for 30-45 minutes after meals, Small bites/sips    Postural Swallow Strategies:  Medication administration: PO       Therapy:  Requires SLP Intervention: No     Referrals:   Back to pulmonology     Consulted and agree with results and recommendations: Patient    Education:  Consulted and

## 2019-08-27 ENCOUNTER — TELEPHONE (OUTPATIENT)
Dept: INTERNAL MEDICINE CLINIC | Age: 74
End: 2019-08-27

## 2019-08-27 DIAGNOSIS — K85.80 OTHER ACUTE PANCREATITIS WITHOUT INFECTION OR NECROSIS: Primary | ICD-10-CM

## 2019-08-27 DIAGNOSIS — K85.80 OTHER ACUTE PANCREATITIS WITHOUT INFECTION OR NECROSIS: ICD-10-CM

## 2019-08-27 DIAGNOSIS — D64.9 ANEMIA, UNSPECIFIED TYPE: ICD-10-CM

## 2019-08-27 LAB
AMYLASE: 81 U/L (ref 25–115)
HCT VFR BLD CALC: 36.9 % (ref 36–48)
HEMOGLOBIN: 11.7 G/DL (ref 12–16)
LIPASE: 51 U/L (ref 13–60)
MCH RBC QN AUTO: 29.4 PG (ref 26–34)
MCHC RBC AUTO-ENTMCNC: 31.7 G/DL (ref 31–36)
MCV RBC AUTO: 92.8 FL (ref 80–100)
PDW BLD-RTO: 17.9 % (ref 12.4–15.4)
PLATELET # BLD: 355 K/UL (ref 135–450)
PMV BLD AUTO: 8.4 FL (ref 5–10.5)
RBC # BLD: 3.98 M/UL (ref 4–5.2)
WBC # BLD: 9.7 K/UL (ref 4–11)

## 2019-08-27 NOTE — TELEPHONE ENCOUNTER
Pt states she's to have follow up labs, pancreas enzymes? Please order labs to be done at [facility].

## 2019-09-09 ENCOUNTER — TELEPHONE (OUTPATIENT)
Dept: INTERNAL MEDICINE CLINIC | Age: 74
End: 2019-09-09

## 2019-11-04 ENCOUNTER — HOSPITAL ENCOUNTER (OUTPATIENT)
Dept: CT IMAGING | Age: 74
Discharge: HOME | End: 2019-11-04
Payer: MEDICARE

## 2019-11-04 ENCOUNTER — NURSE ONLY (OUTPATIENT)
Dept: INTERNAL MEDICINE CLINIC | Age: 74
End: 2019-11-04
Payer: MEDICARE

## 2019-11-04 DIAGNOSIS — Z87.01 HISTORY OF PNEUMONIA, RECURRENT: ICD-10-CM

## 2019-11-04 DIAGNOSIS — Z23 NEED FOR INFLUENZA VACCINATION: Primary | ICD-10-CM

## 2019-11-04 PROCEDURE — 90653 IIV ADJUVANT VACCINE IM: CPT | Performed by: INTERNAL MEDICINE

## 2019-11-04 PROCEDURE — 71250 CT THORAX DX C-: CPT

## 2019-11-04 PROCEDURE — G0008 ADMIN INFLUENZA VIRUS VAC: HCPCS | Performed by: INTERNAL MEDICINE

## 2019-11-11 ENCOUNTER — OFFICE VISIT (OUTPATIENT)
Dept: PULMONOLOGY | Age: 74
End: 2019-11-11
Payer: MEDICARE

## 2019-11-11 VITALS
RESPIRATION RATE: 16 BRPM | OXYGEN SATURATION: 96 % | WEIGHT: 152 LBS | DIASTOLIC BLOOD PRESSURE: 68 MMHG | HEIGHT: 63 IN | BODY MASS INDEX: 26.93 KG/M2 | TEMPERATURE: 97.7 F | SYSTOLIC BLOOD PRESSURE: 128 MMHG | HEART RATE: 52 BPM

## 2019-11-11 DIAGNOSIS — Z87.01 HISTORY OF PNEUMONIA, RECURRENT: ICD-10-CM

## 2019-11-11 DIAGNOSIS — J44.0 OBSTRUCTIVE CHRONIC BRONCHITIS WITH ACUTE BRONCHITIS (HCC): Primary | ICD-10-CM

## 2019-11-11 DIAGNOSIS — J43.1 PANLOBULAR EMPHYSEMA (HCC): ICD-10-CM

## 2019-11-11 PROCEDURE — G8926 SPIRO NO PERF OR DOC: HCPCS | Performed by: INTERNAL MEDICINE

## 2019-11-11 PROCEDURE — G8417 CALC BMI ABV UP PARAM F/U: HCPCS | Performed by: INTERNAL MEDICINE

## 2019-11-11 PROCEDURE — 1036F TOBACCO NON-USER: CPT | Performed by: INTERNAL MEDICINE

## 2019-11-11 PROCEDURE — G8427 DOCREV CUR MEDS BY ELIG CLIN: HCPCS | Performed by: INTERNAL MEDICINE

## 2019-11-11 PROCEDURE — 3017F COLORECTAL CA SCREEN DOC REV: CPT | Performed by: INTERNAL MEDICINE

## 2019-11-11 PROCEDURE — G8482 FLU IMMUNIZE ORDER/ADMIN: HCPCS | Performed by: INTERNAL MEDICINE

## 2019-11-11 PROCEDURE — 4040F PNEUMOC VAC/ADMIN/RCVD: CPT | Performed by: INTERNAL MEDICINE

## 2019-11-11 PROCEDURE — 99214 OFFICE O/P EST MOD 30 MIN: CPT | Performed by: INTERNAL MEDICINE

## 2019-11-11 PROCEDURE — 3023F SPIROM DOC REV: CPT | Performed by: INTERNAL MEDICINE

## 2019-11-11 PROCEDURE — G8598 ASA/ANTIPLAT THER USED: HCPCS | Performed by: INTERNAL MEDICINE

## 2019-11-11 PROCEDURE — G8399 PT W/DXA RESULTS DOCUMENT: HCPCS | Performed by: INTERNAL MEDICINE

## 2019-11-11 PROCEDURE — 1123F ACP DISCUSS/DSCN MKR DOCD: CPT | Performed by: INTERNAL MEDICINE

## 2019-11-11 PROCEDURE — 1090F PRES/ABSN URINE INCON ASSESS: CPT | Performed by: INTERNAL MEDICINE

## 2019-12-05 ENCOUNTER — OFFICE VISIT (OUTPATIENT)
Dept: INTERNAL MEDICINE CLINIC | Age: 74
End: 2019-12-05
Payer: MEDICARE

## 2019-12-05 VITALS
BODY MASS INDEX: 27.81 KG/M2 | WEIGHT: 157 LBS | OXYGEN SATURATION: 91 % | DIASTOLIC BLOOD PRESSURE: 70 MMHG | HEART RATE: 61 BPM | TEMPERATURE: 97.5 F | SYSTOLIC BLOOD PRESSURE: 132 MMHG

## 2019-12-05 DIAGNOSIS — H61.23 EXCESSIVE CERUMEN IN BOTH EAR CANALS: Primary | ICD-10-CM

## 2019-12-05 DIAGNOSIS — I10 ESSENTIAL HYPERTENSION: ICD-10-CM

## 2019-12-05 PROCEDURE — 4040F PNEUMOC VAC/ADMIN/RCVD: CPT | Performed by: INTERNAL MEDICINE

## 2019-12-05 PROCEDURE — G8399 PT W/DXA RESULTS DOCUMENT: HCPCS | Performed by: INTERNAL MEDICINE

## 2019-12-05 PROCEDURE — 1123F ACP DISCUSS/DSCN MKR DOCD: CPT | Performed by: INTERNAL MEDICINE

## 2019-12-05 PROCEDURE — G8417 CALC BMI ABV UP PARAM F/U: HCPCS | Performed by: INTERNAL MEDICINE

## 2019-12-05 PROCEDURE — 1036F TOBACCO NON-USER: CPT | Performed by: INTERNAL MEDICINE

## 2019-12-05 PROCEDURE — 69210 REMOVE IMPACTED EAR WAX UNI: CPT | Performed by: INTERNAL MEDICINE

## 2019-12-05 PROCEDURE — G8598 ASA/ANTIPLAT THER USED: HCPCS | Performed by: INTERNAL MEDICINE

## 2019-12-05 PROCEDURE — 99214 OFFICE O/P EST MOD 30 MIN: CPT | Performed by: INTERNAL MEDICINE

## 2019-12-05 PROCEDURE — G8482 FLU IMMUNIZE ORDER/ADMIN: HCPCS | Performed by: INTERNAL MEDICINE

## 2019-12-05 PROCEDURE — 3017F COLORECTAL CA SCREEN DOC REV: CPT | Performed by: INTERNAL MEDICINE

## 2019-12-05 PROCEDURE — 1090F PRES/ABSN URINE INCON ASSESS: CPT | Performed by: INTERNAL MEDICINE

## 2019-12-05 PROCEDURE — G8427 DOCREV CUR MEDS BY ELIG CLIN: HCPCS | Performed by: INTERNAL MEDICINE

## 2019-12-05 ASSESSMENT — ENCOUNTER SYMPTOMS
SINUS PRESSURE: 0
RHINORRHEA: 0

## 2020-02-24 ENCOUNTER — TELEPHONE (OUTPATIENT)
Dept: INTERNAL MEDICINE CLINIC | Age: 75
End: 2020-02-24

## 2020-02-24 ENCOUNTER — OFFICE VISIT (OUTPATIENT)
Dept: INTERNAL MEDICINE CLINIC | Age: 75
End: 2020-02-24
Payer: MEDICARE

## 2020-02-24 VITALS
HEART RATE: 84 BPM | OXYGEN SATURATION: 90 % | DIASTOLIC BLOOD PRESSURE: 78 MMHG | BODY MASS INDEX: 27.99 KG/M2 | TEMPERATURE: 98.8 F | SYSTOLIC BLOOD PRESSURE: 128 MMHG | WEIGHT: 158 LBS

## 2020-02-24 PROCEDURE — 1090F PRES/ABSN URINE INCON ASSESS: CPT | Performed by: INTERNAL MEDICINE

## 2020-02-24 PROCEDURE — 4040F PNEUMOC VAC/ADMIN/RCVD: CPT | Performed by: INTERNAL MEDICINE

## 2020-02-24 PROCEDURE — G8427 DOCREV CUR MEDS BY ELIG CLIN: HCPCS | Performed by: INTERNAL MEDICINE

## 2020-02-24 PROCEDURE — G8399 PT W/DXA RESULTS DOCUMENT: HCPCS | Performed by: INTERNAL MEDICINE

## 2020-02-24 PROCEDURE — G8417 CALC BMI ABV UP PARAM F/U: HCPCS | Performed by: INTERNAL MEDICINE

## 2020-02-24 PROCEDURE — 99213 OFFICE O/P EST LOW 20 MIN: CPT | Performed by: INTERNAL MEDICINE

## 2020-02-24 PROCEDURE — G8482 FLU IMMUNIZE ORDER/ADMIN: HCPCS | Performed by: INTERNAL MEDICINE

## 2020-02-24 PROCEDURE — 3017F COLORECTAL CA SCREEN DOC REV: CPT | Performed by: INTERNAL MEDICINE

## 2020-02-24 PROCEDURE — 1123F ACP DISCUSS/DSCN MKR DOCD: CPT | Performed by: INTERNAL MEDICINE

## 2020-02-24 PROCEDURE — 1036F TOBACCO NON-USER: CPT | Performed by: INTERNAL MEDICINE

## 2020-02-24 RX ORDER — GUAIFENESIN AND CODEINE PHOSPHATE 100; 10 MG/5ML; MG/5ML
10 SOLUTION ORAL 3 TIMES DAILY PRN
Qty: 120 ML | Refills: 0 | Status: SHIPPED | OUTPATIENT
Start: 2020-02-24 | End: 2020-03-05 | Stop reason: SDUPTHER

## 2020-02-24 RX ORDER — LEVOFLOXACIN 500 MG/1
500 TABLET, FILM COATED ORAL DAILY
Qty: 10 TABLET | Refills: 0 | Status: SHIPPED | OUTPATIENT
Start: 2020-02-24 | End: 2020-03-05

## 2020-02-24 ASSESSMENT — ENCOUNTER SYMPTOMS
APNEA: 0
CHOKING: 0
STRIDOR: 0
EYES NEGATIVE: 1
WHEEZING: 0
SINUS PRESSURE: 0
BACK PAIN: 0
COUGH: 1
GASTROINTESTINAL NEGATIVE: 1
COLOR CHANGE: 0
SORE THROAT: 1

## 2020-02-24 NOTE — PATIENT INSTRUCTIONS
Please call your pharmacy if you need any refills of your medication(s). Please call our office at [phone] if you don't hear from us about your test results. Bring an accurate list of your medications with you at every appointment to ensure that we have the correct information.     Our office hours are: Monday - Friday 8:30 am- 5 pm    Phone lines turn on at 8:30 am

## 2020-02-24 NOTE — PROGRESS NOTES
Disp: 1 Inhaler, Rfl: 5  aspirin 325 MG EC tablet, Take 325 mg by mouth daily , Disp: , Rfl:   allopurinol (ZYLOPRIM) 300 MG tablet, TAKE 1 TABLET EVERY DAY, Disp: 90 tablet, Rfl: 3  clobetasol (TEMOVATE) 0.05 % cream, Apply topically 2 times daily, Disp: 60 g, Rfl: 1  losartan (COZAAR) 50 MG tablet, Take 0.5 tablets by mouth daily, Disp: 30 tablet, Rfl: 0  atorvastatin (LIPITOR) 40 MG tablet, Take 40 mg by mouth daily , Disp: , Rfl:   Loratadine (CLARITIN PO), Take 10 mg by mouth daily. , Disp: , Rfl:   atenolol (TENORMIN) 25 MG tablet, Take 25 mg by mouth daily. , Disp: , Rfl:     No current facility-administered medications for this visit. Allergies: Tramadol; Acetaminophen; and Amoxicillin-pot clavulanate (amoxicillin-pot clavulanate)  Past Medical History:  No date: Allergic rhinitis  No date: Asthma  12/2013: Bunion, right foot  6/7/2010: CAD (coronary artery disease)  No date: CAD (coronary artery disease)  No date: Gout  12/12/2013: Hammertoe  No date: Hyperlipidemia  No date: Hypertension  6/7/2010: IBS (irritable bowel syndrome)  4/1/2019: Obstructive chronic bronchitis with acute bronchitis ([facility] [address]  2010: Pneumonia  No date: Psoriasis  Past Surgical History:  4/23/2019: BRONCHOSCOPY; N/A      Comment:  BRONCHOSCOPY WITH BRONCHOALVEOLAR LAVAGE performed by                [facility]  4/23/2019: [address] ONLY performed by                [facility]  12/12/2013Channie Flash; Right  2009: COLONOSCOPY      Comment:  neg.  3/20/2019: COLONOSCOPY; N/A      Comment:  COLONOSCOPY WITH BIOPSY performed by Emperatriz Coates MD               [facility]  No date: [address]:  1998 2010: EYE SURGERY      Comment:  left  12/12/2013: HAMMER TOE SURGERY;  Right  Review of patient's family history indicates:  Problem: Cancer      Relation: Father          Age of Onset:

## 2020-03-05 ENCOUNTER — OFFICE VISIT (OUTPATIENT)
Dept: INTERNAL MEDICINE CLINIC | Age: 75
End: 2020-03-05
Payer: MEDICARE

## 2020-03-05 VITALS
BODY MASS INDEX: 27.63 KG/M2 | OXYGEN SATURATION: 90 % | DIASTOLIC BLOOD PRESSURE: 60 MMHG | WEIGHT: 156 LBS | TEMPERATURE: 98.3 F | HEART RATE: 67 BPM | SYSTOLIC BLOOD PRESSURE: 124 MMHG

## 2020-03-05 PROCEDURE — G8482 FLU IMMUNIZE ORDER/ADMIN: HCPCS | Performed by: INTERNAL MEDICINE

## 2020-03-05 PROCEDURE — 4040F PNEUMOC VAC/ADMIN/RCVD: CPT | Performed by: INTERNAL MEDICINE

## 2020-03-05 PROCEDURE — G8427 DOCREV CUR MEDS BY ELIG CLIN: HCPCS | Performed by: INTERNAL MEDICINE

## 2020-03-05 PROCEDURE — G8399 PT W/DXA RESULTS DOCUMENT: HCPCS | Performed by: INTERNAL MEDICINE

## 2020-03-05 PROCEDURE — 99214 OFFICE O/P EST MOD 30 MIN: CPT | Performed by: INTERNAL MEDICINE

## 2020-03-05 PROCEDURE — 1123F ACP DISCUSS/DSCN MKR DOCD: CPT | Performed by: INTERNAL MEDICINE

## 2020-03-05 PROCEDURE — 3017F COLORECTAL CA SCREEN DOC REV: CPT | Performed by: INTERNAL MEDICINE

## 2020-03-05 PROCEDURE — G8926 SPIRO NO PERF OR DOC: HCPCS | Performed by: INTERNAL MEDICINE

## 2020-03-05 PROCEDURE — 3023F SPIROM DOC REV: CPT | Performed by: INTERNAL MEDICINE

## 2020-03-05 PROCEDURE — 1036F TOBACCO NON-USER: CPT | Performed by: INTERNAL MEDICINE

## 2020-03-05 PROCEDURE — G8417 CALC BMI ABV UP PARAM F/U: HCPCS | Performed by: INTERNAL MEDICINE

## 2020-03-05 PROCEDURE — 1090F PRES/ABSN URINE INCON ASSESS: CPT | Performed by: INTERNAL MEDICINE

## 2020-03-05 RX ORDER — GUAIFENESIN AND CODEINE PHOSPHATE 100; 10 MG/5ML; MG/5ML
10 SOLUTION ORAL 3 TIMES DAILY PRN
Qty: 120 ML | Refills: 0 | Status: SHIPPED | OUTPATIENT
Start: 2020-03-05 | End: 2020-03-12

## 2020-03-05 RX ORDER — METHYLPREDNISOLONE 4 MG/1
TABLET ORAL
Qty: 1 KIT | Refills: 0 | Status: SHIPPED | OUTPATIENT
Start: 2020-03-05 | End: 2020-03-11

## 2020-03-05 ASSESSMENT — ENCOUNTER SYMPTOMS
ABDOMINAL PAIN: 0
SORE THROAT: 1
BLOOD IN STOOL: 0
RHINORRHEA: 1
COUGH: 1
SHORTNESS OF BREATH: 0

## 2020-03-05 NOTE — PATIENT INSTRUCTIONS
Please call your pharmacy if you need any refills of your medication(s). Please call our office at [phone] if you don't hear from us about your test results. Bring an accurate list of your medications with you at every appointment to ensure that we have the correct information.     Our office hours are: Monday - Friday 8:30 am- 5 pm    Phone lines turn on at 8:30 am

## 2020-03-05 NOTE — PROGRESS NOTES
2 puffs into the lungs 2 times daily Rinse mouth after use., Disp: 1 Inhaler, Rfl: 5  aspirin 325 MG EC tablet, Take 325 mg by mouth daily , Disp: , Rfl:   allopurinol (ZYLOPRIM) 300 MG tablet, TAKE 1 TABLET EVERY DAY, Disp: 90 tablet, Rfl: 3  clobetasol (TEMOVATE) 0.05 % cream, Apply topically 2 times daily, Disp: 60 g, Rfl: 1  losartan (COZAAR) 50 MG tablet, Take 0.5 tablets by mouth daily, Disp: 30 tablet, Rfl: 0  atorvastatin (LIPITOR) 40 MG tablet, Take 40 mg by mouth daily , Disp: , Rfl:   Loratadine (CLARITIN PO), Take 10 mg by mouth daily. , Disp: , Rfl:   atenolol (TENORMIN) 25 MG tablet, Take 25 mg by mouth daily. , Disp: , Rfl:     No current facility-administered medications for this visit. Allergies: Tramadol; Acetaminophen; and Amoxicillin-pot clavulanate (amoxicillin-pot clavulanate)  Past Medical History:  No date: Allergic rhinitis  No date: Asthma  12/2013: Bunion, right foot  6/7/2010: CAD (coronary artery disease)  No date: CAD (coronary artery disease)  No date: Gout  12/12/2013: Hammertoe  No date: Hyperlipidemia  No date: Hypertension  6/7/2010: IBS (irritable bowel syndrome)  4/1/2019: Obstructive chronic bronchitis with acute bronchitis ([facility] [address]  2010: Pneumonia  No date: Psoriasis  Past Surgical History:  4/23/2019: BRONCHOSCOPY; N/A      Comment:  BRONCHOSCOPY WITH BRONCHOALVEOLAR LAVAGE performed by                [facility]ann [facility]ilar MD at [facility]  4/23/2019: [address] [facility] [address] ONLY performed by                Johann Aguilar MD at [facility] [place]  12/12/2013Tom Coyne; Right  2009: COLONOSCOPY      Comment:  neg.  3/20/2019: COLONOSCOPY; N/A      Comment:  COLONOSCOPY WITH BIOPSY performed by Jagdeep Pepper MD               at [facility]  No date: [address]:  1998 2010: EYE SURGERY      Comment:  left  12/12/2013: HAMMER TOE SURGERY;  Right  Review of patient's family history range of motion. Thyroid: No thyromegaly. Cardiovascular:      Rate and Rhythm: Normal rate and regular rhythm. Heart sounds: No murmur. Pulmonary:      Effort: Pulmonary effort is normal.      Breath sounds: Decreased breath sounds and rhonchi present. No wheezing or rales. Abdominal:      General: Bowel sounds are normal.      Palpations: Abdomen is soft. Tenderness: There is no abdominal tenderness. Musculoskeletal:         General: No tenderness. Lymphadenopathy:      Cervical: No cervical adenopathy. Skin:     General: Skin is warm. Neurological:      Mental Status: She is alert. Assessment:      Encounter Diagnoses   Name Primary?  Cough Yes    Acute URI     Obstructive chronic bronchitis with acute bronchitis ([address]     Colitis            Plan:      Mirella Collins was seen today for illness. Diagnoses and all orders for this visit:    Cough  -     guaiFENesin-codeine (TUSSI-ORGANIDIN NR) 100-10 MG/5ML syrup; Take 10 mLs by mouth 3 times daily as needed for Cough for up to 7 days. Acute URI  -     guaiFENesin-codeine (TUSSI-ORGANIDIN NR) 100-10 MG/5ML syrup; Take 10 mLs by mouth 3 times daily as needed for Cough for up to 7 days. Obstructive chronic bronchitis with acute bronchitis ([address]    Colitis    Other orders  -     methylPREDNISolone (MEDROL, RODOLFO,) 4 MG tablet; As directed. 21 or more minutes were spent on the digital evaluation and management of this patient. Has appointment with cardiologist soon. Had episode of CAD with Stent. Stable CAD.         Juan Jackson MD

## 2020-03-12 RX ORDER — ALLOPURINOL 300 MG/1
TABLET ORAL
Qty: 90 TABLET | Refills: 1 | Status: SHIPPED | OUTPATIENT
Start: 2020-03-12 | End: 2020-08-13

## 2020-06-04 ENCOUNTER — OFFICE VISIT (OUTPATIENT)
Dept: PULMONOLOGY | Age: 75
End: 2020-06-04
Payer: MEDICARE

## 2020-06-04 ENCOUNTER — HOSPITAL ENCOUNTER (OUTPATIENT)
Dept: CT IMAGING | Age: 75
Discharge: HOME | End: 2020-06-04
Payer: MEDICARE

## 2020-06-04 VITALS
HEIGHT: 63 IN | SYSTOLIC BLOOD PRESSURE: 102 MMHG | BODY MASS INDEX: 27.29 KG/M2 | TEMPERATURE: 98 F | WEIGHT: 154 LBS | OXYGEN SATURATION: 94 % | HEART RATE: 68 BPM | DIASTOLIC BLOOD PRESSURE: 62 MMHG | RESPIRATION RATE: 16 BRPM

## 2020-06-04 PROBLEM — J20.9 OBSTRUCTIVE CHRONIC BRONCHITIS WITH ACUTE BRONCHITIS (HCC): Status: RESOLVED | Noted: 2019-04-01 | Resolved: 2020-06-04

## 2020-06-04 PROBLEM — J44.0 OBSTRUCTIVE CHRONIC BRONCHITIS WITH ACUTE BRONCHITIS (HCC): Status: RESOLVED | Noted: 2019-04-01 | Resolved: 2020-06-04

## 2020-06-04 PROCEDURE — 1090F PRES/ABSN URINE INCON ASSESS: CPT | Performed by: INTERNAL MEDICINE

## 2020-06-04 PROCEDURE — G8926 SPIRO NO PERF OR DOC: HCPCS | Performed by: INTERNAL MEDICINE

## 2020-06-04 PROCEDURE — 71250 CT THORAX DX C-: CPT

## 2020-06-04 PROCEDURE — G8399 PT W/DXA RESULTS DOCUMENT: HCPCS | Performed by: INTERNAL MEDICINE

## 2020-06-04 PROCEDURE — 3017F COLORECTAL CA SCREEN DOC REV: CPT | Performed by: INTERNAL MEDICINE

## 2020-06-04 PROCEDURE — G8417 CALC BMI ABV UP PARAM F/U: HCPCS | Performed by: INTERNAL MEDICINE

## 2020-06-04 PROCEDURE — 1123F ACP DISCUSS/DSCN MKR DOCD: CPT | Performed by: INTERNAL MEDICINE

## 2020-06-04 PROCEDURE — 99213 OFFICE O/P EST LOW 20 MIN: CPT | Performed by: INTERNAL MEDICINE

## 2020-06-04 PROCEDURE — 1036F TOBACCO NON-USER: CPT | Performed by: INTERNAL MEDICINE

## 2020-06-04 PROCEDURE — G8427 DOCREV CUR MEDS BY ELIG CLIN: HCPCS | Performed by: INTERNAL MEDICINE

## 2020-06-04 PROCEDURE — 4040F PNEUMOC VAC/ADMIN/RCVD: CPT | Performed by: INTERNAL MEDICINE

## 2020-06-04 PROCEDURE — 3023F SPIROM DOC REV: CPT | Performed by: INTERNAL MEDICINE

## 2020-06-04 NOTE — PROGRESS NOTES
90 tablet 1    torsemide (DEMADEX) 5 MG tablet Take 5 mg by mouth      albuterol sulfate HFA (VENTOLIN HFA) 108 (90 Base) MCG/ACT inhaler Inhale 2 puffs into the lungs every 6 hours as needed for Wheezing 18 Inhaler 1    budesonide-formoterol (SYMBICORT) 160-4.5 MCG/ACT AERO Inhale 2 puffs into the lungs 2 times daily Rinse mouth after use. 1 Inhaler 5    aspirin 325 MG EC tablet Take 325 mg by mouth daily       clobetasol (TEMOVATE) 0.05 % cream Apply topically 2 times daily 60 g 1    losartan (COZAAR) 50 MG tablet Take 0.5 tablets by mouth daily 30 tablet 0    atorvastatin (LIPITOR) 40 MG tablet Take 40 mg by mouth daily       Loratadine (CLARITIN PO) Take 10 mg by mouth daily.  atenolol (TENORMIN) 25 MG tablet Take 25 mg by mouth daily. No current facility-administered medications for this visit. Data Reviewed:   CBC and Renal reviewed  Last CBC  Lab Results   Component Value Date    WBC 9.7 08/27/2019    RBC 3.98 08/27/2019    HGB 11.7 08/27/2019    MCV 92.8 08/27/2019     08/27/2019     Last Renal  Lab Results   Component Value Date     07/04/2019    K 3.7 07/04/2019    K 3.8 07/02/2019     07/04/2019    CO2 22 07/04/2019    CO2 22 07/03/2019    CO2 17 07/02/2019    BUN 38 07/04/2019    CREATININE 1.1 07/04/2019    GLUCOSE 85 07/04/2019    CALCIUM 9.0 07/04/2019       Last ABG  POC Blood Gas: No results found for: POCPH, POCPCO2, POCPO2, POCHCO3, NBEA, PEOG3BFY  No results for input(s): PH, PCO2, PO2, HCO3, BE, O2SAT in the last 72 hours. Radiology Review:  Pertinent images / reports were reviewed as a part of this visit. CT Chest w/ contrast: No results found for this or any previous visit.     CT Chest w/o contrast:   Results for orders placed during the hospital encounter of 04/20/19   CT CHEST WO CONTRAST    Narrative EXAMINATION:  CT OF THE CHEST WITHOUT CONTRAST 4/20/2019 8:02 pm    TECHNIQUE:  CT of the chest was performed without the administration of intravenous  contrast. Multiplanar reformatted images are provided for review. Dose  modulation, iterative reconstruction, and/or weight based adjustment of the  mA/kV was utilized to reduce the radiation dose to as low as reasonably  achievable. COMPARISON:  High-resolution CT chest 07/17/2017    HISTORY:  Fatigue, dx with pneumonia on 4/1. she has been on abx (z-pack,  levofloxacin). no improvement. Acuity: Acute    FINDINGS:  Mediastinum: Cardiac structures and great vessels appear unremarkable with  exception of calcific atherosclerotic disease. No pericardial effusion. Posterior mediastinal structures appear unremarkable. No mediastinal or  hilar adenopathy. Lungs/pleura: Stable appearance of biapical scarring with new focal  infiltrate posteromedial left upper lobe and anterolateral lingula left upper  lobe, as well as patchy alveolar and infiltrative densities bilateral lower  lungs, concerning for pneumonia. A few scattered tree-in-bud configured  opacities lateral lower right lung are typical of infectious process, with  similar appearing opacities lateral within the lingula left upper lobe. Sequela from smoking including emphysema predominates in the upper lungs. No  inspissated secretions or endobronchial lesion evident. No pleural effusion  or pneumothorax. Upper Abdomen: Unremarkable appearance. Soft Tissues/Bones: No acute superficial soft tissue or osseous structure  abnormality evident. Impression 1. Patchy infiltrates in the bilateral lungs as described typical pneumonia. Follow-up IV contrast-enhanced CT chest in 3-4 months is recommended to  ensure clearing. 2. Acute pulmonary changes are on a background of sequela from smoking  including emphysema. 3. Calcific atherosclerosis aorta and coronary arteries. RECOMMENDATIONS:  IV contrast-enhanced CT chest in 3-4 months         CTPA: No results found for this or any previous visit.     CXR PA/LAT:   Results for orders

## 2020-08-13 RX ORDER — ALLOPURINOL 300 MG/1
TABLET ORAL
Qty: 90 TABLET | Refills: 1 | Status: SHIPPED | OUTPATIENT
Start: 2020-08-13 | End: 2021-02-19

## 2020-08-20 ENCOUNTER — HOSPITAL ENCOUNTER (EMERGENCY)
Age: 75
Discharge: HOME | End: 2020-08-20
Attending: EMERGENCY MEDICINE
Payer: MEDICARE

## 2020-08-20 VITALS
BODY MASS INDEX: 27.73 KG/M2 | TEMPERATURE: 98 F | RESPIRATION RATE: 18 BRPM | OXYGEN SATURATION: 98 % | HEIGHT: 63 IN | WEIGHT: 156.53 LBS | DIASTOLIC BLOOD PRESSURE: 58 MMHG | SYSTOLIC BLOOD PRESSURE: 128 MMHG | HEART RATE: 70 BPM

## 2020-08-20 PROCEDURE — 99282 EMERGENCY DEPT VISIT SF MDM: CPT

## 2020-08-20 PROCEDURE — 6370000000 HC RX 637 (ALT 250 FOR IP): Performed by: EMERGENCY MEDICINE

## 2020-08-20 RX ORDER — OXYMETAZOLINE HYDROCHLORIDE 0.05 G/100ML
2 SPRAY NASAL ONCE
Status: COMPLETED | OUTPATIENT
Start: 2020-08-20 | End: 2020-08-20

## 2020-08-20 RX ADMIN — OXYMETAZOLINE HCL 2 SPRAY: 0.05 SPRAY NASAL at 10:33

## 2020-08-20 NOTE — ED NOTES
Patient bleeding from right nostril. Patient getting out of shower when it started. No cause. No fall and bumping of nose.       Eduardo Hui RN  08/20/20 [id number]

## 2020-08-20 NOTE — ED PROVIDER NOTES
[address]      Pt Name: Rupert Osorio  MRN: [id number]  [place] 1945  Date of evaluation: 8/20/2020  Provider: Lamar Haynes MD    CHIEF COMPLAINT       Chief Complaint   Patient presents with    Epistaxis     started about 15 mins ago. Patient takes aspirin daily. HISTORY OF PRESENT ILLNESS   (Location/Symptom, Timing/Onset,Context/Setting, Quality, Duration, Modifying Factors, Severity)  Note limiting factors. Rupert Osorio is a 75 y.o. female who presents to the emergency department for evaluation of a nosebleed. Patient reports that the bleeding started approximately 15 minutes prior to arrival to the emergency department. She states that she was getting out of the shower when she looked down and there was blood running down her leg. She states that the bleeding was coming from the right nostril. Patient denies any trauma to the nose. She has not had similar nosebleeds before. She takes a full dose 325 mg aspirin daily, no other blood thinners. She otherwise is feeling well, denies lightheadedness, chest pain, shortness of breath. No other complaints at the time of my evaluation. Patient did put a rolled up Kleenex, into the right nare and applied direct pressure externally. NursingNotes were reviewed. REVIEW OF SYSTEMS    (2-9 systems for level 4, 10 or more for level 5)       Constitutional: No fever or chills. Ear/Nose/Mouth/Throat: No nasal congestion. No sore throat. Nosebleed. Respiratory: No cough, No shortness of breath, No sputum production. Cardiovascular: No chest pain. No palpitations. Gastrointestinal: No abdominal pain. No nausea or vomiting  Hematology/Lymphatics: No bleeding or bruising tendency. Takes full dose aspirin daily. Immunologic: No malaise. No swollen glands. Musculoskeletal: No back pain. No joint pain. Integumentary: No rash. No abrasions. Neurologic: No headache.  No focal numbness or weakness. PAST MEDICAL HISTORY     Past Medical History:   Diagnosis Date    Allergic rhinitis     Asthma     Bunion, right foot 12/2013    CAD (coronary artery disease) 6/7/2010    CAD (coronary artery disease)     Gout     Hammertoe 12/12/2013    Hyperlipidemia     Hypertension     IBS (irritable bowel syndrome) 6/7/2010    Obstructive chronic bronchitis with acute bronchitis ([facility] [address] 4/1/2019    Pneumonia 2010    Psoriasis          SURGICALHISTORY       Past Surgical History:   Procedure Laterality Date    BRONCHOSCOPY N/A 4/23/2019    BRONCHOSCOPY WITH BRONCHOALVEOLAR LAVAGE performed by Kirsten Crawford MD at [address] ([place], [place])  4/23/2019    BRONCHOSCOPY DIAGNOSTIC OR CELL [address] ONLY performed by Kirsten Crawford MD at [address] [place] [address] [address] Right 12/12/2013    COLONOSCOPY  2009    neg.  COLONOSCOPY N/A 3/20/2019    COLONOSCOPY WITH BIOPSY performed by Eduardo Rea MD at [address]  2010    left    HAMMER TOE SURGERY Right 12/12/2013         CURRENT MEDICATIONS       Previous Medications    ALBUTEROL SULFATE HFA (VENTOLIN HFA) 108 (90 BASE) MCG/ACT INHALER    Inhale 2 puffs into the lungs every 6 hours as needed for Wheezing    ALLOPURINOL (ZYLOPRIM) 300 MG TABLET    TAKE 1 TABLET EVERY DAY    ASPIRIN 325 MG EC TABLET    Take 325 mg by mouth daily     ATENOLOL (TENORMIN) 25 MG TABLET    Take 25 mg by mouth daily. ATORVASTATIN (LIPITOR) 40 MG TABLET    Take 40 mg by mouth daily     BUDESONIDE-FORMOTEROL (SYMBICORT) 160-4.5 MCG/ACT AERO    Inhale 2 puffs into the lungs 2 times daily Rinse mouth after use. CLOBETASOL (TEMOVATE) 0.05 % CREAM    Apply topically 2 times daily    LORATADINE (CLARITIN PO)    Take 10 mg by mouth daily.     LOSARTAN (COZAAR) 50 MG TABLET    Take 0.5 tablets by mouth daily    TORSEMIDE (DEMADEX) 5 MG TABLET    Take 5 mg by mouth       ALLERGIES     Tramadol; reactive. HENT: Oral mucosa is moist.  Nasal clamp applied the patient, when this is removed and rolled up Kleenex applied by the patient is removed, there is no active bleeding noted, no source of bleeding visualized on the nasal septum. Respiratory: Lungs are clear to auscultation, Respirations are non-labored. Cardiovascular: Normal rate, Regular rhythm. Gastrointestinal: Soft, Non-tender, Non-distended. Musculoskeletal: No swelling. Integumentary: Warm, Dry. Neurologic: Alert, Oriented, No focal defects. Psychiatric: Cooperative. DIAGNOSTIC RESULTS     EKG: All EKG's are interpreted by the Emergency Department Physician who either signs or Co-signsthis chart in the absence of a cardiologist.      RADIOLOGY:   [place] Comes such as CT, Ultrasound and MRI are read by the radiologist. Plain radiographic images are visualized and preliminarily interpreted by the emergency physician with the below findings:      Interpretation per the Radiologist below, if available at the time ofthis note:    No orders to display         ED BEDSIDE ULTRASOUND:   Performed by ED Physician - none    LABS:  Labs Reviewed - No data to display    All other labs were within normal range or not returned as of this dictation. EMERGENCY DEPARTMENT COURSE and DIFFERENTIAL DIAGNOSIS/MDM:   Vitals:    Vitals:    08/20/20 1023   BP: (!) 130/55   Pulse: 67   Resp: 20   SpO2: 98%   Weight: 156 lb 8.4 oz (71 kg)   Height: 5' 3\" (1.6 m)         Medical decision making: This is a 78-year-old female who presents for evaluation of epistaxis from the right naris. On exam, she is well-appearing, afebrile, with normal vital signs. No symptoms of lightheadedness, shortness of breath, chest pain to suggest significant blood loss. She placed packing in the right naris at home, and bleeding is controlled at the time of arrival here. No source of bleeding is visualized on examination of the nasal septum.   Patient was treated with topical Afrin, observed in the emergency department for approximately 1 hour and had no recurrent bleeding. Patient counseled to apply Afrin twice daily for 3 days, to help prevent rebleeding. If bleeding does recur, and she is unable to stop this at home, recommended to return immediately to the emergency department. Patient is agreeable plan of care, understanding return precautions, discharged in stable condition. CRITICAL CARE TIME   Total Critical Care time was 0 minutes, excluding separately reportable procedures. There was a high probability of clinically significant/life threatening deterioration in the patient's condition which required my urgent intervention. CONSULTS:  None    PROCEDURES:  Unless otherwise noted below, none         FINAL IMPRESSION      1. Epistaxis          DISPOSITION/PLAN   DISPOSITION Decision To Discharge 08/20/2020 11:05:10 AM      PATIENT REFERRED TO:  Mitra Stanford MD  [address] [place] [address].   Goldy Zambrano [id number] [phone]    In 3 days  As needed      DISCHARGE MEDICATIONS:  New Prescriptions    No medications on file          (Please note that portions of this note were completed with a voice recognition program.Efforts were made to edit the dictations but occasionally words are mis-transcribed.)    Enriqueta Nelson MD (electronically signed)  Attending Emergency Physician        Enriqueta Nelson MD  08/20/20 8230

## 2020-08-20 NOTE — ED NOTES
Discharge instructions reviewed. Patient verbalized understanding. Patient will follow up with PCP.        Samuel Park RN  08/20/20 6432

## 2020-08-20 NOTE — ED TRIAGE NOTES
Patient came to ER with complaints of nose bleed. Started about 15 mins ago after getting out of the shower.

## 2020-08-21 ENCOUNTER — HOSPITAL ENCOUNTER (EMERGENCY)
Age: 75
Discharge: HOME | End: 2020-08-21
Attending: EMERGENCY MEDICINE
Payer: MEDICARE

## 2020-08-21 VITALS
SYSTOLIC BLOOD PRESSURE: 155 MMHG | BODY MASS INDEX: 27.73 KG/M2 | HEART RATE: 76 BPM | TEMPERATURE: 98.2 F | WEIGHT: 156.53 LBS | OXYGEN SATURATION: 99 % | DIASTOLIC BLOOD PRESSURE: 60 MMHG | HEIGHT: 63 IN | RESPIRATION RATE: 19 BRPM

## 2020-08-21 PROCEDURE — 30901 CONTROL OF NOSEBLEED: CPT

## 2020-08-21 PROCEDURE — 99282 EMERGENCY DEPT VISIT SF MDM: CPT

## 2020-08-22 NOTE — ED PROVIDER NOTES
eMERGENCY dEPARTMENT eNCOUnter        [facility]    Chief Complaint   Patient presents with    Epistaxis     Patient seen yesterday for the same. States she was given afrin and did not bleed again until about 30 minutes prior to presentation. She has soaked cotton balls in afrin and placed in her nose, still continuing to bleed. Patient takes 325 aspirin daily, did not take this morning       HPI    Lashawn Gaines is a 75 y.o. female who presents with epistaxis. She states that this was evaluated yesterday in the emergency department and then it stopped until about 30 minutes prior to arrival today. She has cotton balls soaked with Afrin and she placed them in her nostrils. She states that it continues to bleed. No exacerbating or relieving factor no other associated signs or symptoms    REVIEW OF SYSTEMS    See HPI for further details. Review of systems otherwise negative. 10 point review of systems reviewed and is otherwise negative  PAST MEDICAL HISTORY    Past Medical History:   Diagnosis Date    Allergic rhinitis     Asthma     Bunion, right foot 12/2013    CAD (coronary artery disease) 6/7/2010    CAD (coronary artery disease)     Gout     Hammertoe 12/12/2013    Hyperlipidemia     Hypertension     IBS (irritable bowel syndrome) 6/7/2010    Obstructive chronic bronchitis with acute bronchitis ([facility] [address] 4/1/2019    Pneumonia 2010    Psoriasis        SURGICAL HISTORY    Past Surgical History:   Procedure Laterality Date    BRONCHOSCOPY N/A 4/23/2019    BRONCHOSCOPY WITH BRONCHOALVEOLAR LAVAGE performed by Caitlin Day MD at [address] [facility]  4/23/2019    BRONCHOSCOPY DIAGNOSTIC OR CELL [address] ONLY performed by Caitlin Day MD at [address] [facility] [address] Right 12/12/2013    COLONOSCOPY  2009    neg.     COLONOSCOPY N/A 3/20/2019    [facility]    left    HAMMER TOE SURGERY Right 2013       CURRENT MEDICATIONS    Current Outpatient Rx   Medication Sig Dispense Refill    allopurinol (ZYLOPRIM) 300 MG tablet TAKE 1 TABLET EVERY DAY 90 tablet 1    torsemide (DEMADEX) 5 MG tablet Take 5 mg by mouth      albuterol sulfate HFA (VENTOLIN HFA) 108 (90 Base) MCG/ACT inhaler Inhale 2 puffs into the lungs every 6 hours as needed for Wheezing 18 Inhaler 1    budesonide-formoterol (SYMBICORT) 160-4.5 MCG/ACT AERO Inhale 2 puffs into the lungs 2 times daily Rinse mouth after use. 1 Inhaler 5    aspirin 325 MG EC tablet Take 325 mg by mouth daily       clobetasol (TEMOVATE) 0.05 % cream Apply topically 2 times daily 60 g 1    losartan (COZAAR) 50 MG tablet Take 0.5 tablets by mouth daily 30 tablet 0    atorvastatin (LIPITOR) 40 MG tablet Take 40 mg by mouth daily       Loratadine (CLARITIN PO) Take 10 mg by mouth daily.  atenolol (TENORMIN) 25 MG tablet Take 25 mg by mouth daily.          ALLERGIES    Allergies   Allergen Reactions    Tramadol Swelling    Acetaminophen Swelling    Amoxicillin-Pot Clavulanate [Amoxicillin-Pot Clavulanate] Nausea And Vomiting       FAMILY HISTORY    Family History   Problem Relation Age of Onset    Cancer Father         GI CANCER    Crohn's Disease Sister     Lung Cancer Brother        SOCIAL HISTORY    Social History     Socioeconomic History    Marital status:      Spouse name: None    Number of children: None    Years of education: None    Highest education level: None   Occupational History    None   Social Needs    Financial resource strain: None    Food insecurity     Worry: None     Inability: None    Transportation needs     Medical: None     Non-medical: None   Tobacco Use    Smoking status: Former Smoker     Packs/day: 1.00     Years: 30.00     Pack years: 30.00     Types: Cigarettes     Last attempt to quit: 3/2/1999     Years since quittin.4    Smokeless tobacco: Never Used   Substance and Sexual Activity    Alcohol use: No    Drug use: No    Sexual activity: Not Currently   Lifestyle    Physical activity     Days per week: None     Minutes per session: None    Stress: None   Relationships    Social connections     Talks on phone: None     Gets together: None     Attends Congregation service: None     Active member of club or organization: None     Attends meetings of clubs or organizations: None     Relationship status: None    Intimate partner violence     Fear of current or ex partner: None     Emotionally abused: None     Physically abused: None     Forced sexual activity: None   Other Topics Concern    None   Social History Narrative    ** Merged History Encounter **            PHYSICAL EXAM    VITAL SIGNS: BP (!) 164/43   Pulse 77   Temp 98.2 °F (36.8 °C) (Oral)   Resp 18   Ht 5' 3\" (1.6 m)   Wt 156 lb 8.4 oz (71 kg)   SpO2 99%   BMI 27.73 kg/m²   Constitutional:  Well developed, well nourished, no acute distress, non-toxic appearance   Eyes: PERRL, conjunctiva normal   HENT: Normocephalic. She has some bleeding from the right nostril but unable to tell from where. External nose normal external ears are normal  Respiratory: Clear to auscultation  Cardiovascular:  Normal rate, normal rhythm, no murmurs, no gallops, no rubs   Musculoskeletal:  No edema   Integument:  Well hydrated, no rash     RADIOLOGY/PROCEDURES    Nose was packed with a 5.5 Rhino Rocket    ED COURSE & MEDICAL DECISION MAKING    Pertinent Labs & Imaging studies reviewed. (See chart for details)  This patient tolerated the packing very well. Bleeding was stopped here in the department. She is hemodynamically stable to discharge home    FINAL IMPRESSION    1.  Epistaxis  2.           Endy Holden MD  08/21/20 9830

## 2020-08-23 ENCOUNTER — HOSPITAL ENCOUNTER (EMERGENCY)
Age: 75
Discharge: HOME | End: 2020-08-23
Attending: EMERGENCY MEDICINE
Payer: MEDICARE

## 2020-08-23 VITALS
TEMPERATURE: 98 F | HEIGHT: 63 IN | OXYGEN SATURATION: 95 % | BODY MASS INDEX: 27.62 KG/M2 | SYSTOLIC BLOOD PRESSURE: 126 MMHG | RESPIRATION RATE: 18 BRPM | HEART RATE: 63 BPM | WEIGHT: 155.87 LBS | DIASTOLIC BLOOD PRESSURE: 56 MMHG

## 2020-08-23 PROCEDURE — 99283 EMERGENCY DEPT VISIT LOW MDM: CPT

## 2020-08-23 ASSESSMENT — ENCOUNTER SYMPTOMS
EYE PAIN: 0
SHORTNESS OF BREATH: 0

## 2020-08-23 NOTE — ED TRIAGE NOTES
Patient to EMERGENCY DEPARTMENT ambulatory requesting nasal -right nare rhino rapid tampon like balloon string taped to cheek.  had nose bleed Thursday and Friday with placement on Friday ( 2 days ago).  was instructed by Jefry Deleon MD to return to [facility] and have catheter removed from right nares.  No further bleeding

## 2020-08-23 NOTE — ED PROVIDER NOTES
[address] Aval[address]      Pt Name: Yary Alaniz  MRN: [id number]  [place] 1945  Date of evaluation: 8/23/2020  Provider: Jeffrey Espino MD    CHIEF COMPLAINT       Chief Complaint   Patient presents with    Other     nasal balloon to right nares- placed 2 days ago- states was told by ED MD to come back here for removal     HISTORY OF PRESENT ILLNESS  (Location/Symptom, Timing/Onset,Context/Setting, Quality, Duration, Modifying Factors, Severity). Note limiting factors. Yary Alaniz is a 75 y.o. female who presents to the emergency department to have the nasal packing removed. She states she was here a couple of days ago and was told to return today to have it removed. She states she was here 3 days ago and treated with medications however the nosebleed returned so she came back at which point a Rhino Rocket was placed. She does have a primary care physician she is planning to see tomorrow. She has not had nosebleeds prior to 3 days ago. She states the packing is uncomfortable though bearable. Past medical history noted below, significant for allergic rhinitis, asthma, coronary artery disease, gout, hammertoe, hyperlipidemia, hypertension, IBS, psoriasis. States she quit smoking over 20 years ago. Aside from what is stated above denies any other symptoms or modifying factors. Nursing Notes reviewed. REVIEW OF SYSTEMS  (2-9 systems for level 4, 10 or more for level 5)   Review of Systems   Constitutional: Negative for appetite change, chills and fever. HENT: Negative for ear pain. Eyes: Negative for pain. Respiratory: Negative for shortness of breath. Cardiovascular: Negative for chest pain. Genitourinary: Negative. Neurological: Negative for weakness.        PAST MEDICAL HISTORY     Past Medical History:   Diagnosis Date    Allergic rhinitis     Asthma     Bunion, right foot 12/2013    CAD (coronary artery disease) 6/7/2010  CAD (coronary artery disease)     Gout     Hammertoe 12/12/2013    Hyperlipidemia     Hypertension     IBS (irritable bowel syndrome) 6/7/2010    Obstructive chronic bronchitis with acute bronchitis ([address] 4/1/2019    Pneumonia 2010    Psoriasis        SURGICALHISTORY       Past Surgical History:   Procedure Laterality Date    BRONCHOSCOPY N/A 4/23/2019    BRONCHOSCOPY WITH BRONCHOALVEOLAR LAVAGE performed by Kirsten Crawford MD at [address]  4/23/2019    BRONCHOSCOPY DIAGNOSTIC OR CELL [address] ONLY performed by Kirsten Crawford MD at [address] [place] [address] [address] Right 12/12/2013    COLONOSCOPY  2009    neg.  COLONOSCOPY N/A 3/20/2019    COLONOSCOPY WITH BIOPSY performed by Eduardo Rea MD at [address]  2010    left    HAMMER TOE SURGERY Right 12/12/2013     CURRENT MEDICATIONS       Previous Medications    ALBUTEROL SULFATE HFA (VENTOLIN HFA) 108 (90 BASE) MCG/ACT INHALER    Inhale 2 puffs into the lungs every 6 hours as needed for Wheezing    ALLOPURINOL (ZYLOPRIM) 300 MG TABLET    TAKE 1 TABLET EVERY DAY    ASPIRIN 325 MG EC TABLET    Take 325 mg by mouth daily     ATENOLOL (TENORMIN) 25 MG TABLET    Take 25 mg by mouth daily. ATORVASTATIN (LIPITOR) 40 MG TABLET    Take 40 mg by mouth daily     BUDESONIDE-FORMOTEROL (SYMBICORT) 160-4.5 MCG/ACT AERO    Inhale 2 puffs into the lungs 2 times daily Rinse mouth after use. CLOBETASOL (TEMOVATE) 0.05 % CREAM    Apply topically 2 times daily    LORATADINE (CLARITIN PO)    Take 10 mg by mouth daily.     LOSARTAN (COZAAR) 50 MG TABLET    Take 0.5 tablets by mouth daily    TORSEMIDE (DEMADEX) 5 MG TABLET    Take 5 mg by mouth      ALLERGIES     Tramadol; Acetaminophen; and Amoxicillin-pot clavulanate [amoxicillin-pot clavulanate]  FAMILY HISTORY       Family History   Problem Relation Age of Onset    Cancer Father         GI CANCER    Crohn's Disease Sister     Lung Cancer Brother      SOCIAL HISTORY       Social History     Socioeconomic History    Marital status:      Spouse name: None    Number of children: None    Years of education: None    Highest education level: None   Occupational History    None   Social Needs    Financial resource strain: None    Food insecurity     Worry: None     Inability: None    Transportation needs     Medical: None     Non-medical: None   Tobacco Use    Smoking status: Former Smoker     Packs/day: 1.00     Years: 30.00     Pack years: 30.00     Types: Cigarettes     Last attempt to quit: 3/2/1999     Years since quittin.4    Smokeless tobacco: Never Used   Substance and Sexual Activity    Alcohol use: No    Drug use: No    Sexual activity: Not Currently   Lifestyle    Physical activity     Days per week: None     Minutes per session: None    Stress: None   Relationships    Social connections     Talks on phone: None     Gets together: None     Attends Holiness service: None     Active member of club or organization: None     Attends meetings of clubs or organizations: None     Relationship status: None    Intimate partner violence     Fear of current or ex partner: None     Emotionally abused: None     Physically abused: None     Forced sexual activity: None   Other Topics Concern    None   Social History Narrative    ** Merged History Encounter **          SCREENINGS         PHYSICAL EXAM  (up to 7 for level 4, 8 or more for level 5)   INITIAL VITALS: BP: (!) 133/54, Temp: 98.7 °F (37.1 °C), Pulse: 66, Resp: 18, SpO2: 95 %   Physical Exam  Vitals signs reviewed. Constitutional:       General: She is not in acute distress. Appearance: She is not ill-appearing or toxic-appearing. Comments: Largely well-appearing elderly female who appears younger than stated age. Pleasant and cooperative with exam.  Answers questions in complete sentences.  sitting at the bedside.    HENT: Head: Normocephalic and atraumatic. Right Ear: External ear normal.      Left Ear: External ear normal.      Nose: No rhinorrhea. Comments: Rhino Rocket packing noted in right nare with tail taped to the side of the face     Mouth/Throat:      Mouth: Mucous membranes are moist.      Comments: Posterior oropharynx clear  Eyes:      General: No scleral icterus. Right eye: No discharge. Left eye: No discharge. Extraocular Movements: Extraocular movements intact. Conjunctiva/sclera: Conjunctivae normal.      Pupils: Pupils are equal, round, and reactive to light. Neck:      Musculoskeletal: Normal range of motion. Trachea: No tracheal deviation. Cardiovascular:      Rate and Rhythm: Normal rate. Pulses: Normal pulses. Comments: Radial pulses 2+  Pulmonary:      Effort: Pulmonary effort is normal. No respiratory distress. Skin:     General: Skin is warm and dry. Capillary Refill: Capillary refill takes less than 2 seconds. Neurological:      General: No focal deficit present. Mental Status: She is alert and oriented to person, place, and time. Gait: Gait normal.   Psychiatric:         Attention and Perception: Attention normal.         Mood and Affect: Mood normal.         Speech: Speech normal.         Behavior: Behavior normal.       DIAGNOSTIC RESULTS     Not applicable    EMERGENCY DEPARTMENT COURSE and DIFFERENTIAL DIAGNOSIS/MDM:   Patient was given the following medications:  No orders of the defined types were placed in this encounter. CONSULTS:  None  INITIAL VITALS: BP: (!) 133/54, Temp: 98.7 °F (37.1 °C), Pulse: 66, Resp: 18, SpO2: 95 %   RECENT VITALS:  BP: (!) 129/56,Temp: 98.7 °F (37.1 °C), Pulse: 65, Resp: 18, SpO2: 95 %     Haja Tello is a 75 y.o. female who presents to the emergency department to have her Rhino Rocket removed.       Review of the medical record shows prior physician had recommended patient follow-up with her primary care and does not mention returning to our emergency department for [facility] removal.  She has had it in for 48 hours and we discussed the upper limit of leaving was 72 hours and she is planning to see her primary care physician tomorrow. She and her  discussed and decided to try to have it removed today. This was done in the emergency department without any complications. Reassessment of her nares after removal showed some mild irritation to because of backs plexus with no active bleeding. She was monitored for another 35 minutes without any recurrence. She ate and drank and walked around. We discussed the importance of doing things to decrease pressure to prevent further epistaxis such as sneezing with her mouth open, not bending over, not drinking through a straw. We recommended she still follow-up with her primary care tomorrow and discussed also following up with ENT which she was offered a referral today but stated she would rather talk to her primary care tomorrow. PROCEDURES:  Procedures   Air from [facility] removed with 10ml syringe x 2. Then removed without complication. No bleeding noted. Patient tolerated procedure well    FINAL IMPRESSION      1. Encounter for removal of nasal packing    2. History of epistaxis        DISPOSITION/PLAN   DISPOSITION        PATIENT REFERRED TO:  Catarina Valencia MD  [address] [facility].   [place] [address]  [phone]    Schedule an appointment as soon as possible for a visit   For follow up appointment      DISCHARGE MEDICATIONS:  New Prescriptions    No medications on file            (Please note that portions of this note were completed with a voice recognition program. Efforts were made to edit the dictations but occasionally words are mis-transcribed.)    Emily Mcdonough MD (electronically signed)  Attending Emergency Physician          Emily Mcdonough MD  08/23/20 1777

## 2020-08-23 NOTE — ED NOTES
Dr Jay Raymond and myself at bedside- air removed from balloon and deflated- rhino rapid removed with serosanguinous drainage.  Patient requests to rest quietly for a while then walk     Evonne Fabry, RN  08/23/20 7196

## 2020-08-24 ENCOUNTER — TELEPHONE (OUTPATIENT)
Dept: FAMILY MEDICINE CLINIC | Age: 75
End: 2020-08-24

## 2020-08-31 ENCOUNTER — OFFICE VISIT (OUTPATIENT)
Dept: ENT CLINIC | Age: 75
End: 2020-08-31
Payer: MEDICARE

## 2020-08-31 VITALS
BODY MASS INDEX: 27.28 KG/M2 | HEART RATE: 65 BPM | TEMPERATURE: 96.9 F | OXYGEN SATURATION: 94 % | DIASTOLIC BLOOD PRESSURE: 71 MMHG | SYSTOLIC BLOOD PRESSURE: 153 MMHG | WEIGHT: 154 LBS

## 2020-08-31 PROCEDURE — 31238 NSL/SINS NDSC SRG NSL HEMRRG: CPT | Performed by: OTOLARYNGOLOGY

## 2020-08-31 PROCEDURE — 99203 OFFICE O/P NEW LOW 30 MIN: CPT | Performed by: OTOLARYNGOLOGY

## 2020-08-31 RX ORDER — SULFAMETHOXAZOLE AND TRIMETHOPRIM 800; 160 MG/1; MG/1
1 TABLET ORAL 2 TIMES DAILY
Qty: 14 TABLET | Refills: 0 | Status: SHIPPED | OUTPATIENT
Start: 2020-08-31 | End: 2020-09-07

## 2020-09-08 ENCOUNTER — TELEPHONE (OUTPATIENT)
Dept: ENT CLINIC | Age: 75
End: 2020-09-08

## 2020-09-08 NOTE — TELEPHONE ENCOUNTER
Pt called because she is running a low grade she thinks its because of the sinus infection she has, she wanted to know if she could get some more antibotics? Please advise?

## 2020-09-09 ENCOUNTER — OFFICE VISIT (OUTPATIENT)
Dept: PRIMARY CARE CLINIC | Age: 75
End: 2020-09-09
Payer: MEDICARE

## 2020-09-09 PROCEDURE — G8428 CUR MEDS NOT DOCUMENT: HCPCS | Performed by: NURSE PRACTITIONER

## 2020-09-09 PROCEDURE — G8417 CALC BMI ABV UP PARAM F/U: HCPCS | Performed by: NURSE PRACTITIONER

## 2020-09-09 PROCEDURE — 99211 OFF/OP EST MAY X REQ PHY/QHP: CPT | Performed by: NURSE PRACTITIONER

## 2020-09-10 LAB — SARS-COV-2, NAA: NOT DETECTED

## 2020-09-14 ENCOUNTER — OFFICE VISIT (OUTPATIENT)
Dept: ENT CLINIC | Age: 75
End: 2020-09-14
Payer: MEDICARE

## 2020-09-14 VITALS
BODY MASS INDEX: 27.11 KG/M2 | HEART RATE: 62 BPM | SYSTOLIC BLOOD PRESSURE: 148 MMHG | TEMPERATURE: 96.7 F | HEIGHT: 63 IN | DIASTOLIC BLOOD PRESSURE: 85 MMHG | WEIGHT: 153 LBS

## 2020-09-14 PROCEDURE — 99213 OFFICE O/P EST LOW 20 MIN: CPT | Performed by: OTOLARYNGOLOGY

## 2020-09-14 NOTE — PROGRESS NOTES
Marital status:      Spouse name: Not on file    Number of children: Not on file    Years of education: Not on file    Highest education level: Not on file   Occupational History    Not on file   Social Needs    Financial resource strain: Not on file    Food insecurity     Worry: Not on file     Inability: Not on file    Transportation needs     Medical: Not on file     Non-medical: Not on file   Tobacco Use    Smoking status: Former Smoker     Packs/day: 1.00     Years: 30.00     Pack years: 30.00     Types: Cigarettes     Last attempt to quit: 3/2/1999     Years since quittin.5    Smokeless tobacco: Never Used   Substance and Sexual Activity    Alcohol use: No    Drug use: No    Sexual activity: Not Currently   Lifestyle    Physical activity     Days per week: Not on file     Minutes per session: Not on file    Stress: Not on file   Relationships    Social connections     Talks on phone: Not on file     Gets together: Not on file     Attends Holiness service: Not on file     Active member of club or organization: Not on file     Attends meetings of clubs or organizations: Not on file     Relationship status: Not on file    Intimate partner violence     Fear of current or ex partner: Not on file     Emotionally abused: Not on file     Physically abused: Not on file     Forced sexual activity: Not on file   Other Topics Concern    Not on file   Social History Narrative    ** Merged History Encounter **            DRUG/FOOD ALLERGIES: Tramadol; Acetaminophen; and Amoxicillin-pot clavulanate [amoxicillin-pot clavulanate]    CURRENT MEDICATIONS  Prior to Admission medications    Medication Sig Start Date End Date Taking?  Authorizing Provider   allopurinol (ZYLOPRIM) 300 MG tablet TAKE 1 TABLET EVERY DAY 20   Maame Atkins MD   torsemide (DEMADEX) 5 MG tablet Take 5 mg by mouth    Historical Provider, MD   albuterol sulfate HFA (VENTOLIN HFA) 108 (90 Base) MCG/ACT inhaler Inhale 2 puffs into the lungs every 6 hours as needed for Wheezing 6/14/19   Dora Lynch MD   budesonide-formoterol [facility]) 160-4.5 MCG/ACT AERO Inhale 2 puffs into the lungs 2 times daily Rinse mouth after use. 6/3/19   Dora Lynch MD   aspirin 325 MG EC tablet Take 325 mg by mouth daily     Historical Provider, MD   clobetasol (TEMOVATE) 0.05 % cream Apply topically 2 times daily 6/26/17   Dora Lynch MD   losartan (COZAAR) 50 MG tablet Take 0.5 tablets by mouth daily 3/30/17   Yris Arriaza MD   atorvastatin (LIPITOR) 40 MG tablet Take 40 mg by mouth daily  12/1/14   Historical Provider, MD   Loratadine (CLARITIN PO) Take 10 mg by mouth daily. Historical Provider, MD   atenolol (TENORMIN) 25 MG tablet Take 25 mg by mouth daily. Historical Provider, MD       REVIEW OF SYSTEMS  The following systems were reviewed and revealed the following in addition to any already discussed in the HPI:    CONSTITUTIONAL: no weight loss, no fever, no night sweats, no chills  EYES: no vision changes, no blurry vision  EARS: no changes in hearing, no otalgia  NOSE: Resolved epistaxis  RESPIRATORY: no  Difficulty breathing, no shortness of breath  CV: no chest pain, no Peripheral vascular disease  HEME: No coagulation disorder, no Bleeding disorder  NEURO: no TIA or stroke-like symptoms  SKIN: No new rashes in the head and neck, no recent skin cancers  MOUTH: No new ulcers, no recent teeth infections  GASTROINTESTINAL: No diarrhea, stomach pain  PSYCH: No anxiety, no depression      PHYSICAL EXAM  BP (!) 148/85 (Site: Right Upper Arm, Position: Sitting, Cuff Size: Medium Adult)   Pulse 62   Temp 96.7 °F (35.9 °C) (Temporal)   Ht 5' 3\" (1.6 m)   Wt 153 lb (69.4 kg)   BMI 27.10 kg/m²     GENERAL: No Acute Distress, Alert and Oriented, no Hoarseness, strong voice  EYES: EOMI, Anti-icteric  HENT:   Head: Normocephalic and atraumatic.    Face:  Symmetric, facial nerve intact, no sinus tenderness  Right Ear: Normal external ear, normal external auditory canal, intact tympanic membrane with normal mobility and aerated middle ear  Left Ear: Normal external ear, normal external auditory canal, intact tympanic membrane with normal mobility and aerated middle ear  Mouth/Oral Cavity:  normal lips, Uvula is midline, no mucosal lesions, no trismus, normal dentition, normal salivary quality/flow  Oropharynx/Larynx:  normal oropharynx, normal tonsils; normal larynx/nasopharynx on mirror exam  Nose:Normal external nasal appearance. Anterior rhinoscopy shows some crusting of the right anterior septum that appears to be healing well. Otherwise no evidence of bleeding or sinusitis  NECK: Normal range of motion, no thyromegaly, trachea is midline, no lymphadenopathy, no neck masses, no crepitus  CHEST: Normal respiratory effort, no retractions, breathing comfortably  SKIN: No rashes, normal appearing skin, no evidence of skin lesions/tumors  Neuro:  cranial nerve II-XII intact; normal gait  Cardio:  no edema          ASSESSMENT/PLAN  1. Epistaxis  So for this patient's epistaxis has been controlled. I want her to continue to use nasal saline irrigation. I think she can start blowing her nose. Also think it is okay for her to start a baby aspirin. If she has any additional bleeding I would like for her to call and schedule an appoint with me at the time of the bleeding. If I am in the office I would like to see it when is actually bleeding as it is easier for me to tell where it is coming from. Otherwise she will follow-up as needed. I have performed a head and neck physical exam personally or was physically present during the key or critical portions of the service. Medical Decision Making:   The following items were considered in medical decision making:  Independent review of images  Review / order clinical lab tests  Review / order radiology tests  Decision to obtain old records

## 2020-11-04 ENCOUNTER — IMMUNIZATION (OUTPATIENT)
Dept: FAMILY MEDICINE CLINIC | Age: 75
End: 2020-11-04
Payer: MEDICARE

## 2020-11-04 PROCEDURE — G0008 ADMIN INFLUENZA VIRUS VAC: HCPCS | Performed by: INTERNAL MEDICINE

## 2020-11-04 PROCEDURE — 90694 VACC AIIV4 NO PRSRV 0.5ML IM: CPT | Performed by: INTERNAL MEDICINE

## 2020-11-04 NOTE — PROGRESS NOTES
Vaccine Information Sheet, \"Influenza - Inactivated\"  given to Skylar Madison, or parent/legal guardian of  Skylar Madison and verbalized understanding. Patient responses:    Have you ever had a reaction to a flu vaccine? No  Do you have any current illness? No  Have you ever had [place] Syndrome? No  Do you have a serious allergy to any of the follow: Neomycin, Polymyxin, Thimerosal, eggs or egg products? No    Flu vaccine given per order. Please see immunization tab. Risks and benefits explained. Current VIS given.

## 2020-12-29 RX ORDER — BUDESONIDE AND FORMOTEROL FUMARATE DIHYDRATE 160; 4.5 UG/1; UG/1
2 AEROSOL RESPIRATORY (INHALATION) 2 TIMES DAILY
Qty: 1 INHALER | Refills: 5 | Status: SHIPPED | OUTPATIENT
Start: 2020-12-29 | End: 2022-03-04 | Stop reason: SDUPTHER

## 2021-02-19 RX ORDER — ALLOPURINOL 300 MG/1
TABLET ORAL
Qty: 90 TABLET | Refills: 1 | Status: SHIPPED | OUTPATIENT
Start: 2021-02-19 | End: 2021-08-27 | Stop reason: SDUPTHER

## 2021-02-22 RX ORDER — PROCHLORPERAZINE MALEATE 10 MG
10 TABLET ORAL EVERY 6 HOURS PRN
Qty: 50 TABLET | Refills: 3 | OUTPATIENT
Start: 2021-02-22

## 2021-02-22 RX ORDER — PROCHLORPERAZINE MALEATE 10 MG
10 TABLET ORAL EVERY 6 HOURS PRN
Qty: 100 TABLET | Refills: 3 | Status: SHIPPED | OUTPATIENT
Start: 2021-02-22

## 2021-04-02 ENCOUNTER — OFFICE VISIT (OUTPATIENT)
Dept: FAMILY MEDICINE CLINIC | Age: 76
End: 2021-04-02
Payer: MEDICARE

## 2021-04-02 VITALS
TEMPERATURE: 97.3 F | SYSTOLIC BLOOD PRESSURE: 134 MMHG | BODY MASS INDEX: 28.42 KG/M2 | WEIGHT: 160.4 LBS | HEIGHT: 63 IN | DIASTOLIC BLOOD PRESSURE: 78 MMHG

## 2021-04-02 DIAGNOSIS — R50.9 FEVER, UNSPECIFIED FEVER CAUSE: ICD-10-CM

## 2021-04-02 DIAGNOSIS — J01.90 ACUTE BACTERIAL SINUSITIS: Primary | ICD-10-CM

## 2021-04-02 DIAGNOSIS — B96.89 ACUTE BACTERIAL SINUSITIS: Primary | ICD-10-CM

## 2021-04-02 LAB
BASOPHILS ABSOLUTE: 0.1 K/UL (ref 0–0.2)
BASOPHILS RELATIVE PERCENT: 1.2 %
EOSINOPHILS ABSOLUTE: 0.6 K/UL (ref 0–0.6)
EOSINOPHILS RELATIVE PERCENT: 6.2 %
HCT VFR BLD CALC: 36.6 % (ref 36–48)
HEMOGLOBIN: 12.4 G/DL (ref 12–16)
LYMPHOCYTES ABSOLUTE: 1.4 K/UL (ref 1–5.1)
LYMPHOCYTES RELATIVE PERCENT: 15.6 %
MCH RBC QN AUTO: 30.5 PG (ref 26–34)
MCHC RBC AUTO-ENTMCNC: 33.9 G/DL (ref 31–36)
MCV RBC AUTO: 89.9 FL (ref 80–100)
MONOCYTES ABSOLUTE: 0.9 K/UL (ref 0–1.3)
MONOCYTES RELATIVE PERCENT: 9.5 %
NEUTROPHILS ABSOLUTE: 6.1 K/UL (ref 1.7–7.7)
NEUTROPHILS RELATIVE PERCENT: 67.5 %
PDW BLD-RTO: 15.3 % (ref 12.4–15.4)
PLATELET # BLD: 365 K/UL (ref 135–450)
PMV BLD AUTO: 8 FL (ref 5–10.5)
RBC # BLD: 4.07 M/UL (ref 4–5.2)
WBC # BLD: 9.1 K/UL (ref 4–11)

## 2021-04-02 PROCEDURE — G8427 DOCREV CUR MEDS BY ELIG CLIN: HCPCS | Performed by: INTERNAL MEDICINE

## 2021-04-02 PROCEDURE — 1123F ACP DISCUSS/DSCN MKR DOCD: CPT | Performed by: INTERNAL MEDICINE

## 2021-04-02 PROCEDURE — G8399 PT W/DXA RESULTS DOCUMENT: HCPCS | Performed by: INTERNAL MEDICINE

## 2021-04-02 PROCEDURE — 4040F PNEUMOC VAC/ADMIN/RCVD: CPT | Performed by: INTERNAL MEDICINE

## 2021-04-02 PROCEDURE — 1090F PRES/ABSN URINE INCON ASSESS: CPT | Performed by: INTERNAL MEDICINE

## 2021-04-02 PROCEDURE — 99213 OFFICE O/P EST LOW 20 MIN: CPT | Performed by: INTERNAL MEDICINE

## 2021-04-02 PROCEDURE — 1036F TOBACCO NON-USER: CPT | Performed by: INTERNAL MEDICINE

## 2021-04-02 PROCEDURE — G8417 CALC BMI ABV UP PARAM F/U: HCPCS | Performed by: INTERNAL MEDICINE

## 2021-04-02 RX ORDER — ASPIRIN 81 MG/1
81 TABLET ORAL DAILY
COMMUNITY

## 2021-04-02 RX ORDER — CEFUROXIME AXETIL 250 MG/1
250 TABLET ORAL 2 TIMES DAILY
Qty: 20 TABLET | Refills: 0 | Status: SHIPPED | OUTPATIENT
Start: 2021-04-02 | End: 2021-04-12

## 2021-04-02 ASSESSMENT — ENCOUNTER SYMPTOMS
ABDOMINAL PAIN: 0
SHORTNESS OF BREATH: 0
WHEEZING: 0
RHINORRHEA: 0
COUGH: 0
APNEA: 0
SINUS PAIN: 1
SORE THROAT: 0
BLOOD IN STOOL: 0

## 2021-04-02 ASSESSMENT — PATIENT HEALTH QUESTIONNAIRE - PHQ9
SUM OF ALL RESPONSES TO PHQ9 QUESTIONS 1 & 2: 0
SUM OF ALL RESPONSES TO PHQ QUESTIONS 1-9: 0
2. FEELING DOWN, DEPRESSED OR HOPELESS: 0
1. LITTLE INTEREST OR PLEASURE IN DOING THINGS: 0

## 2021-04-02 NOTE — PROGRESS NOTES
Syed Allen (:  1945) is a 67 y.o. female,Established patient, here for evaluation of the following chief complaint(s):  Fever (patient c/o fever off and on since 3/11/2021; headache. patient denies sore throat, cough, abdominal pain)      ASSESSMENT/PLAN:   Diagnosis Orders   1. Acute bacterial sinusitis     2. Fever, unspecified fever cause  CBC Auto Differential     Outpatient Encounter Medications as of 2021   Medication Sig Dispense Refill    aspirin 81 MG EC tablet Take 81 mg by mouth daily      cefUROXime (CEFTIN) 250 MG tablet Take 1 tablet by mouth 2 times daily for 10 days 20 tablet 0    prochlorperazine (COMPAZINE) 10 MG tablet Take 1 tablet by mouth every 6 hours as needed (nausea.) 100 tablet 3    allopurinol (ZYLOPRIM) 300 MG tablet TAKE 1 TABLET EVERY DAY 90 tablet 1    budesonide-formoterol (SYMBICORT) 160-4.5 MCG/ACT AERO Inhale 2 puffs into the lungs 2 times daily Rinse mouth after use. 1 Inhaler 5    torsemide (DEMADEX) 5 MG tablet Take 5 mg by mouth      albuterol sulfate HFA (VENTOLIN HFA) 108 (90 Base) MCG/ACT inhaler Inhale 2 puffs into the lungs every 6 hours as needed for Wheezing 18 Inhaler 1    clobetasol (TEMOVATE) 0.05 % cream Apply topically 2 times daily 60 g 1    losartan (COZAAR) 50 MG tablet Take 0.5 tablets by mouth daily 30 tablet 0    atorvastatin (LIPITOR) 40 MG tablet Take 40 mg by mouth daily       Loratadine (CLARITIN PO) Take 10 mg by mouth daily.  atenolol (TENORMIN) 25 MG tablet Take 25 mg by mouth daily.  [DISCONTINUED] aspirin 325 MG EC tablet Take 325 mg by mouth daily        No facility-administered encounter medications on file as of 2021. Orders Placed This Encounter   Procedures    CBC Auto Differential     Standing Status:   Future     Standing Expiration Date:   2022       SUBJECTIVE/OBJECTIVE:  HPI   Chief Complaint   Patient presents with    Fever     patient c/o fever off and on since 3/11/2021; headache. patient denies sore throat, cough, abdominal pain     Candi Jama is a 67 y.o. female with the following history as recorded in [facility]:  Patient Active Problem List    Diagnosis Date Noted    Pulmonary emphysema ([facility] 08/01/2019    Acute pancreatitis 07/02/2019    Fever 05/30/2019    Steroid withdrawal syndrome without complication ([facility] 65/72/0362    History of pneumonia, recurrent     Collagenous colitis 04/21/2019    Sepsis ([facility] 04/20/2019    Bunion, right foot 12/12/2013    Hammertoe 12/12/2013    HTN (hypertension) 12/12/2013    Hyperlipidemia 12/12/2013    Gout, arthritis 06/07/2010    IBS (irritable bowel syndrome) 06/07/2010    CAD (coronary artery disease) 06/07/2010     Current Outpatient Medications   Medication Sig Dispense Refill    aspirin 81 MG EC tablet Take 81 mg by mouth daily      prochlorperazine (COMPAZINE) 10 MG tablet Take 1 tablet by mouth every 6 hours as needed (nausea.) 100 tablet 3    allopurinol (ZYLOPRIM) 300 MG tablet TAKE 1 TABLET EVERY DAY 90 tablet 1    budesonide-formoterol (SYMBICORT) 160-4.5 MCG/ACT AERO Inhale 2 puffs into the lungs 2 times daily Rinse mouth after use. 1 Inhaler 5    torsemide (DEMADEX) 5 MG tablet Take 5 mg by mouth      albuterol sulfate HFA (VENTOLIN HFA) 108 (90 Base) MCG/ACT inhaler Inhale 2 puffs into the lungs every 6 hours as needed for Wheezing 18 Inhaler 1    clobetasol (TEMOVATE) 0.05 % cream Apply topically 2 times daily 60 g 1    losartan (COZAAR) 50 MG tablet Take 0.5 tablets by mouth daily 30 tablet 0    atorvastatin (LIPITOR) 40 MG tablet Take 40 mg by mouth daily       Loratadine (CLARITIN PO) Take 10 mg by mouth daily.  atenolol (TENORMIN) 25 MG tablet Take 25 mg by mouth daily. No current facility-administered medications for this visit.       Allergies: Tramadol, Acetaminophen, Amoxicillin-pot clavulanate [amoxicillin-pot clavulanate], and Doxycycline  Past Medical History:   Diagnosis Date    Allergic rhinitis     Asthma     Bunion, right foot 2013    CAD (coronary artery disease) 2010    CAD (coronary artery disease)     Gout     Hammertoe 2013    Hyperlipidemia     Hypertension     IBS (irritable bowel syndrome) 2010    Obstructive chronic bronchitis with acute bronchitis ([facility] 2019    Pneumonia 2010    Psoriasis      Past Surgical History:   Procedure Laterality Date    BRONCHOSCOPY N/A 2019    BRONCHOSCOPY WITH BRONCHOALVEOLAR LAVAGE performed by Ofelia Gallagher MD at [address] [facility]  2019    BRONCHOSCOPY DIAGNOSTIC OR CELL [address] ONLY performed by Ofelia Gallagher MD at [address] [address] [address] Right 2013    COLONOSCOPY  2009    neg.  COLONOSCOPY N/A 3/20/2019    COLONOSCOPY WITH BIOPSY performed by Merary Chamberlain MD at [address]      left    HAMMER TOE SURGERY Right 2013     Family History   Problem Relation Age of Onset    Cancer Father         GI CANCER    Crohn's Disease Sister     Lung Cancer Brother      Social History     Tobacco Use    Smoking status: Former Smoker     Packs/day: 1.00     Years: 30.00     Pack years: 30.00     Types: Cigarettes     Quit date: 3/2/1999     Years since quittin.1    Smokeless tobacco: Never Used   Substance Use Topics    Alcohol use: No       Review of Systems   Constitutional: Positive for fever. HENT: Positive for postnasal drip and sinus pain. Negative for congestion, rhinorrhea and sore throat. Eyes: Negative for visual disturbance. Respiratory: Negative for apnea, cough, shortness of breath and wheezing. Cardiovascular: Negative for chest pain and palpitations. Gastrointestinal: Negative for abdominal pain and blood in stool. Endocrine: Negative for polyuria. Genitourinary: Negative for dysuria and frequency. Neurological: Negative for dizziness, weakness, numbness and headaches. Hematological: Negative for adenopathy. Physical Exam  Vitals signs and nursing note reviewed. Constitutional:       General: She is not in acute distress. Appearance: Normal appearance. She is not ill-appearing, toxic-appearing or diaphoretic. HENT:      Head: Normocephalic and atraumatic. Right Ear: Tympanic membrane and ear canal normal.      Left Ear: Tympanic membrane and ear canal normal.   Cardiovascular:      Rate and Rhythm: Normal rate. Pulmonary:      Effort: Pulmonary effort is normal. No respiratory distress. Breath sounds: Normal breath sounds. No wheezing. Abdominal:      General: There is no distension. Tenderness: There is no abdominal tenderness. There is no guarding. Musculoskeletal:         General: No swelling. Skin:     Coloration: Skin is not jaundiced. Findings: No bruising. Neurological:      Mental Status: She is alert. Cranial Nerves: No cranial nerve deficit. Motor: No weakness. Psychiatric:         Mood and Affect: Mood normal.         Thought Content:  Thought content normal.                 An electronic signature was used to authenticate this note.    --Brit Sahni, DO

## 2021-04-12 ENCOUNTER — HOSPITAL ENCOUNTER (OUTPATIENT)
Dept: GENERAL RADIOLOGY | Age: 76
Discharge: HOME | End: 2021-04-12
Payer: MEDICARE

## 2021-04-12 ENCOUNTER — HOSPITAL ENCOUNTER (OUTPATIENT)
Age: 76
Discharge: HOME | End: 2021-04-12
Payer: MEDICARE

## 2021-04-12 ENCOUNTER — TELEPHONE (OUTPATIENT)
Dept: FAMILY MEDICINE CLINIC | Age: 76
End: 2021-04-12

## 2021-04-12 DIAGNOSIS — R50.9 FEVER, UNSPECIFIED FEVER CAUSE: Primary | ICD-10-CM

## 2021-04-12 DIAGNOSIS — R50.9 FEVER, UNSPECIFIED FEVER CAUSE: ICD-10-CM

## 2021-04-12 LAB
BILIRUBIN URINE: NEGATIVE
BLOOD, URINE: NEGATIVE
CLARITY: CLEAR
COLOR: YELLOW
EPITHELIAL CELLS, UA: 1 /HPF (ref 0–5)
GLUCOSE URINE: NEGATIVE MG/DL
HYALINE CASTS: 0 /LPF (ref 0–8)
KETONES, URINE: NEGATIVE MG/DL
LEUKOCYTE ESTERASE, URINE: NEGATIVE
MICROSCOPIC EXAMINATION: NORMAL
NITRITE, URINE: NEGATIVE
PH UA: 6 (ref 5–8)
PROTEIN UA: NEGATIVE MG/DL
RBC UA: 0 /HPF (ref 0–4)
SPECIFIC GRAVITY UA: 1.01 (ref 1–1.03)
URINE TYPE: NORMAL
UROBILINOGEN, URINE: 0.2 E.U./DL
WBC UA: 0 /HPF (ref 0–5)

## 2021-04-12 PROCEDURE — 71046 X-RAY EXAM CHEST 2 VIEWS: CPT

## 2021-04-12 NOTE — TELEPHONE ENCOUNTER
Pt was seen and was prescribed an abx and its not working,  Bradford Innocent her last pill last night and still has a fever of 100.4  Is there anything else that could be done?     Pl advise   [phone]

## 2021-04-29 ENCOUNTER — TELEPHONE (OUTPATIENT)
Dept: FAMILY MEDICINE CLINIC | Age: 76
End: 2021-04-29

## 2021-04-29 DIAGNOSIS — R50.9 FEVER OF UNKNOWN ORIGIN: Primary | ICD-10-CM

## 2021-04-29 NOTE — TELEPHONE ENCOUNTER
Pt stated that she has had a fever for 40 days, Dr. Laura Gaona did all kind of testing, the fever finally went away for about a week, it just came back again 2 days ago, last night it was 101.00    Pt would like to know what would be the next step?   Pl advise [phone] (home)

## 2021-06-12 NOTE — PROGRESS NOTES
Care facility calling wondering if pt can be seen at [facility] due to insurance not covering transportation and family not being able to due to COVID.     Pt should keep his follow up visit with Ramses on 10/26 at [place].    If he wants his US at [facility] for 11/20 and a telephone visit with Dr. Ortiz that is ok, but will need to change his US order from here at [place] to [facility].   [facility]      Patient Name: Jenny Ramos Record Number:  [id number]  Primary Care Physician:  Murali Stephens MD  Date of Consultation: 8/31/2020    Chief Complaint: Epistaxis        HISTORY OF PRESENT ILLNESS  Tracy Marquez is a(n) 75 y.o. female who presents for evaluation of epistaxis. Reportedly had a Rhino Rocket placed on August 21 and then removed in the emergency room 2 days later. Since that time she is had a couple episodes of bleeding. Patient is normally on 325 of aspirin, but has been off of it for about a week now. She is not had any major episodes of bleeding like she did when she had a Rhino Rocket placed. She denies any history of nosebleeds. Denies any other history of bleeding issues. She is using nasal saline. She has not been blowing her nose. Patient Active Problem List   Diagnosis    Gout, arthritis    IBS (irritable bowel syndrome)    CAD (coronary artery disease)    Bunion, right foot    Hammertoe    HTN (hypertension)    Hyperlipidemia    Sepsis ([address]    Collagenous colitis    History of pneumonia, recurrent    Steroid withdrawal syndrome without complication ([address]    Fever    Acute pancreatitis    Pulmonary emphysema ([address]     Past Surgical History:   Procedure Laterality Date    BRONCHOSCOPY N/A 4/23/2019    BRONCHOSCOPY WITH BRONCHOALVEOLAR LAVAGE performed by Rommel Mccord MD at [address] [facility]  4/23/2019    [facility] ONLY performed by Rommel Mccord MD at [address] [address] [address] Right 12/12/2013    COLONOSCOPY  2009    neg.     COLONOSCOPY N/A 3/20/2019    COLONOSCOPY WITH BIOPSY performed by Kelly Weller MD at [address]  2010    left    HAMMER TOE SURGERY Right 12/12/2013     Family History   Problem Relation Age of Onset    Cancer Father         GI CANCER    Crohn's Disease Sister     Lung Cancer Brother      Social History     Socioeconomic History    Marital status:      Spouse name: Not on file    Number of children: Not on file    Years of education: Not on file    Highest education level: Not on file   Occupational History    Not on file   Social Needs    Financial resource strain: Not on file    Food insecurity     Worry: Not on file     Inability: Not on file    Transportation needs     Medical: Not on file     Non-medical: Not on file   Tobacco Use    Smoking status: Former Smoker     Packs/day: 1.00     Years: 30.00     Pack years: 30.00     Types: Cigarettes     Last attempt to quit: 3/2/1999     Years since quittin.5    Smokeless tobacco: Never Used   Substance and Sexual Activity    Alcohol use: No    Drug use: No    Sexual activity: Not Currently   Lifestyle    Physical activity     Days per week: Not on file     Minutes per session: Not on file    Stress: Not on file   Relationships    Social connections     Talks on phone: Not on file     Gets together: Not on file     Attends Judaism service: Not on file     Active member of club or organization: Not on file     Attends meetings of clubs or organizations: Not on file     Relationship status: Not on file    Intimate partner violence     Fear of current or ex partner: Not on file     Emotionally abused: Not on file     Physically abused: Not on file     Forced sexual activity: Not on file   Other Topics Concern    Not on file   Social History Narrative    ** Merged History Encounter **            DRUG/FOOD ALLERGIES: Tramadol; Acetaminophen; and Amoxicillin-pot clavulanate [amoxicillin-pot clavulanate]    CURRENT MEDICATIONS  Prior to Admission medications    Medication Sig Start Date End Date Taking?  Authorizing Provider   sulfamethoxazole-trimethoprim (BACTRIM DS;SEPTRA DS) 800-160 MG per tablet Take 1 tablet by mouth 2 times daily for 7 days 20 Yes Ameena Bazzi MD oRnak   allopurinol (ZYLOPRIM) 300 MG tablet TAKE 1 TABLET EVERY DAY 8/13/20   Sonali Esparza MD   torsemide (DEMADEX) 5 MG tablet Take 5 mg by mouth    Historical Provider, MD   albuterol sulfate HFA (VENTOLIN HFA) 108 (90 Base) MCG/ACT inhaler Inhale 2 puffs into the lungs every 6 hours as needed for Wheezing 6/14/19   Sonali Esparza MD   budesonide-formoterol (SYMBICORT) 160-4.5 MCG/ACT AERO Inhale 2 puffs into the lungs 2 times daily Rinse mouth after use. 6/3/19   Sonali Esparza MD   aspirin 325 MG EC tablet Take 325 mg by mouth daily     Historical Provider, MD   clobetasol (TEMOVATE) 0.05 % cream Apply topically 2 times daily 6/26/17   Sonali Esparza MD   losartan (COZAAR) 50 MG tablet Take 0.5 tablets by mouth daily 3/30/17   Laura Fang MD   atorvastatin (LIPITOR) 40 MG tablet Take 40 mg by mouth daily  12/1/14   Historical Provider, MD   Loratadine (CLARITIN PO) Take 10 mg by mouth daily. Historical Provider, MD   atenolol (TENORMIN) 25 MG tablet Take 25 mg by mouth daily.     Historical Provider, MD       REVIEW OF SYSTEMS  The following systems were reviewed and revealed the following in addition to any already discussed in the HPI:    CONSTITUTIONAL: no weight loss, no fever, no night sweats, no chills  EYES: no vision changes, no blurry vision  EARS: no changes in hearing, no otalgia  NOSE: Epistaxis  RESPIRATORY: no  Difficulty breathing, no shortness of breath  CV: no chest pain, no Peripheral vascular disease  HEME: No coagulation disorder, no Bleeding disorder  NEURO: no TIA or stroke-like symptoms  SKIN: No new rashes in the head and neck, no recent skin cancers  MOUTH: No new ulcers, no recent teeth infections  GASTROINTESTINAL: No diarrhea, stomach pain  PSYCH: No anxiety, no depression      PHYSICAL EXAM  BP (!) 153/71 (Site: Left Upper Arm, Position: Sitting, Cuff Size: Medium Adult)   Pulse 65   Temp 96.9 °F (36.1 °C) (Temporal)   Wt 154 lb (69.9 kg)   SpO2 94%   BMI 27.28 kg/m² GENERAL: No Acute Distress, Alert and Oriented, no Hoarseness, strong voice  EYES: EOMI, Anti-icteric  HENT:   Head: Normocephalic and atraumatic. Face:  Symmetric, facial nerve intact, no sinus tenderness  Right Ear: Normal external ear, normal external auditory canal, intact tympanic membrane with normal mobility and aerated middle ear  Left Ear: Normal external ear, normal external auditory canal, intact tympanic membrane with normal mobility and aerated middle ear  Mouth/Oral Cavity:  normal lips, Uvula is midline, no mucosal lesions, no trismus, normal dentition, normal salivary quality/flow  Oropharynx/Larynx:  normal oropharynx, normal tonsils; normal larynx/nasopharynx on mirror exam  Nose: See below  NECK: Normal range of motion, no thyromegaly, trachea is midline, no lymphadenopathy, no neck masses, no crepitus  CHEST: Normal respiratory effort, no retractions, breathing comfortably  SKIN: No rashes, normal appearing skin, no evidence of skin lesions/tumors  Neuro:  cranial nerve II-XII intact; normal gait  Cardio:  no edema    PROCEDURE  Nasal endoscopy with control of epistaxis  Afrin and lidocaine were applied the bilateral nasal cavity the left-sided not appreciate any evidence of recent bleeding. The right side there is significant mound of crusting that I debrided from the nose with bayonet forceps and a Pepper suction. This revealed an area on the anterior septum that appeared to have some excoriations and possible recent bleeding. There was also areas on the inferior turbinate as well as the anterior aspect of the middle turbinate. More posteriorly in the nasopharynx I did not appreciate anything that would suggest bleeding, but there was some purulent drainage using the scope for assistance I did cauterize the small areas in the middle turbinate, inferior turbinate and anterior septum with silver nitrate. I then covered the area with Surgicel. ASSESSMENT/PLAN  1. Epistaxis  This patient had several areas in the right nasal cavity that could have been the source of the epistaxis. Unfortunate after having a Rhino Rocket placed, is difficult to tell what caused the original bleeding. I cauterize a couple of areas and then covered that with silver nitrate. I want her to continue use the nasal saline to keep things moist.  I think is best for her to hold off on using aspirin until we know we have this under control. I will see her in 2 weeks for follow-up    2. Acute sinusitis, recurrence not specified, unspecified location  The patient appears to have an obstructive sinusitis on the right side from having the [employer] in place. I am giving her a week of Bactrim for this. I have performed a head and neck physical exam personally or was physically present during the key or critical portions of the service. Medical Decision Making:   The following items were considered in medical decision making:  Independent review of images  Review / order clinical lab tests  Review / order radiology tests  Decision to obtain old records

## 2021-08-11 ENCOUNTER — OFFICE VISIT (OUTPATIENT)
Dept: FAMILY MEDICINE CLINIC | Age: 76
End: 2021-08-11
Payer: MEDICARE

## 2021-08-11 VITALS
TEMPERATURE: 97.7 F | DIASTOLIC BLOOD PRESSURE: 66 MMHG | SYSTOLIC BLOOD PRESSURE: 112 MMHG | HEIGHT: 63 IN | WEIGHT: 155.4 LBS | BODY MASS INDEX: 27.54 KG/M2

## 2021-08-11 DIAGNOSIS — R21 SKIN RASH: Primary | ICD-10-CM

## 2021-08-11 PROCEDURE — 1123F ACP DISCUSS/DSCN MKR DOCD: CPT | Performed by: INTERNAL MEDICINE

## 2021-08-11 PROCEDURE — 1036F TOBACCO NON-USER: CPT | Performed by: INTERNAL MEDICINE

## 2021-08-11 PROCEDURE — 1090F PRES/ABSN URINE INCON ASSESS: CPT | Performed by: INTERNAL MEDICINE

## 2021-08-11 PROCEDURE — 4040F PNEUMOC VAC/ADMIN/RCVD: CPT | Performed by: INTERNAL MEDICINE

## 2021-08-11 PROCEDURE — G8399 PT W/DXA RESULTS DOCUMENT: HCPCS | Performed by: INTERNAL MEDICINE

## 2021-08-11 PROCEDURE — 99213 OFFICE O/P EST LOW 20 MIN: CPT | Performed by: INTERNAL MEDICINE

## 2021-08-11 PROCEDURE — G8417 CALC BMI ABV UP PARAM F/U: HCPCS | Performed by: INTERNAL MEDICINE

## 2021-08-11 PROCEDURE — G8427 DOCREV CUR MEDS BY ELIG CLIN: HCPCS | Performed by: INTERNAL MEDICINE

## 2021-08-11 RX ORDER — METHYLPREDNISOLONE 4 MG/1
TABLET ORAL
Qty: 1 KIT | Refills: 0 | Status: SHIPPED | OUTPATIENT
Start: 2021-08-11 | End: 2021-12-27

## 2021-08-11 SDOH — ECONOMIC STABILITY: FOOD INSECURITY: WITHIN THE PAST 12 MONTHS, THE FOOD YOU BOUGHT JUST DIDN'T LAST AND YOU DIDN'T HAVE MONEY TO GET MORE.: NEVER TRUE

## 2021-08-11 SDOH — ECONOMIC STABILITY: FOOD INSECURITY: WITHIN THE PAST 12 MONTHS, YOU WORRIED THAT YOUR FOOD WOULD RUN OUT BEFORE YOU GOT MONEY TO BUY MORE.: NEVER TRUE

## 2021-08-11 ASSESSMENT — SOCIAL DETERMINANTS OF HEALTH (SDOH): HOW HARD IS IT FOR YOU TO PAY FOR THE VERY BASICS LIKE FOOD, HOUSING, MEDICAL CARE, AND HEATING?: NOT HARD AT ALL

## 2021-08-11 ASSESSMENT — ENCOUNTER SYMPTOMS
SHORTNESS OF BREATH: 0
COUGH: 0
SINUS PAIN: 0
SINUS PRESSURE: 0
RHINORRHEA: 0
APNEA: 0

## 2021-08-11 NOTE — PROGRESS NOTES
Jackie Bullard (:  1945) is a 67 y.o. female,Established patient, here for evaluation of the following chief complaint(s):  Rash (patient c/o rash- hands, abdomen, under arms. ? allergic reaction to Aleve.  patient is currently taking Oxycodone and Ibuprofen for dental procedure)         ASSESSMENT/PLAN:   Diagnosis Orders   1. Skin rash       Outpatient Encounter Medications as of 2021   Medication Sig Dispense Refill    methylPREDNISolone (MEDROL, RODOLFO,) 4 MG tablet Take by mouth. 1 kit 0    aspirin 81 MG EC tablet Take 81 mg by mouth daily      prochlorperazine (COMPAZINE) 10 MG tablet Take 1 tablet by mouth every 6 hours as needed (nausea.) 100 tablet 3    allopurinol (ZYLOPRIM) 300 MG tablet TAKE 1 TABLET EVERY DAY 90 tablet 1    budesonide-formoterol (SYMBICORT) 160-4.5 MCG/ACT AERO Inhale 2 puffs into the lungs 2 times daily Rinse mouth after use. 1 Inhaler 5    torsemide (DEMADEX) 5 MG tablet Take 5 mg by mouth      albuterol sulfate HFA (VENTOLIN HFA) 108 (90 Base) MCG/ACT inhaler Inhale 2 puffs into the lungs every 6 hours as needed for Wheezing 18 Inhaler 1    clobetasol (TEMOVATE) 0.05 % cream Apply topically 2 times daily 60 g 1    losartan (COZAAR) 50 MG tablet Take 0.5 tablets by mouth daily 30 tablet 0    atorvastatin (LIPITOR) 40 MG tablet Take 40 mg by mouth daily       Loratadine (CLARITIN PO) Take 10 mg by mouth daily.  atenolol (TENORMIN) 25 MG tablet Take 25 mg by mouth daily. No facility-administered encounter medications on file as of 2021. No orders of the defined types were placed in this encounter. stop oxycodone         Subjective   SUBJECTIVE/OBJECTIVE:  HPI   Chief Complaint   Patient presents with    Rash     patient c/o rash- hands, abdomen, under arms. ?  allergic reaction to Aleve.  patient is currently taking Oxycodone and Ibuprofen for dental procedure     Jackie Bullard is a 67 y.o. female with the following history as recorded in [employer]:  Patient Active Problem List    Diagnosis Date Noted    Pulmonary emphysema ([facility] 08/01/2019    Acute pancreatitis 07/02/2019    Fever 05/30/2019    Steroid withdrawal syndrome without complication ([facility] 83/38/9888    History of pneumonia, recurrent     Collagenous colitis 04/21/2019    Sepsis ([facility] 04/20/2019    Bunion, right foot 12/12/2013    Hammertoe 12/12/2013    HTN (hypertension) 12/12/2013    Hyperlipidemia 12/12/2013    Gout, arthritis 06/07/2010    IBS (irritable bowel syndrome) 06/07/2010    CAD (coronary artery disease) 06/07/2010     Current Outpatient Medications   Medication Sig Dispense Refill    aspirin 81 MG EC tablet Take 81 mg by mouth daily      prochlorperazine (COMPAZINE) 10 MG tablet Take 1 tablet by mouth every 6 hours as needed (nausea.) 100 tablet 3    allopurinol (ZYLOPRIM) 300 MG tablet TAKE 1 TABLET EVERY DAY 90 tablet 1    budesonide-formoterol (SYMBICORT) 160-4.5 MCG/ACT AERO Inhale 2 puffs into the lungs 2 times daily Rinse mouth after use. 1 Inhaler 5    torsemide (DEMADEX) 5 MG tablet Take 5 mg by mouth      albuterol sulfate HFA (VENTOLIN HFA) 108 (90 Base) MCG/ACT inhaler Inhale 2 puffs into the lungs every 6 hours as needed for Wheezing 18 Inhaler 1    clobetasol (TEMOVATE) 0.05 % cream Apply topically 2 times daily 60 g 1    losartan (COZAAR) 50 MG tablet Take 0.5 tablets by mouth daily 30 tablet 0    atorvastatin (LIPITOR) 40 MG tablet Take 40 mg by mouth daily       Loratadine (CLARITIN PO) Take 10 mg by mouth daily.  atenolol (TENORMIN) 25 MG tablet Take 25 mg by mouth daily. No current facility-administered medications for this visit.      Allergies: Tramadol, Acetaminophen, Amoxicillin-pot clavulanate [amoxicillin-pot clavulanate], and Doxycycline  Past Medical History:   Diagnosis Date    Allergic rhinitis     Asthma     Bunion, right foot 12/2013    CAD (coronary artery disease) 6/7/2010    CAD (coronary artery disease) sounds: No murmur heard. Pulmonary:      Effort: No respiratory distress. Breath sounds: No wheezing. Musculoskeletal:      Cervical back: No rigidity. Lymphadenopathy:      Cervical: No cervical adenopathy. Skin:     Comments: Rash abd arms chest    Neurological:      Mental Status: She is alert.                   An electronic signature was used to authenticate this note.    --Peace Aden, DO

## 2021-08-27 RX ORDER — ALLOPURINOL 300 MG/1
TABLET ORAL
Qty: 90 TABLET | Refills: 2 | Status: SHIPPED | OUTPATIENT
Start: 2021-08-27 | End: 2022-02-23

## 2021-11-01 ENCOUNTER — OFFICE VISIT (OUTPATIENT)
Dept: FAMILY MEDICINE CLINIC | Age: 76
End: 2021-11-01
Payer: MEDICARE

## 2021-11-01 VITALS
BODY MASS INDEX: 28.21 KG/M2 | SYSTOLIC BLOOD PRESSURE: 136 MMHG | TEMPERATURE: 97.3 F | HEIGHT: 63 IN | WEIGHT: 159.2 LBS | DIASTOLIC BLOOD PRESSURE: 70 MMHG

## 2021-11-01 DIAGNOSIS — B02.9 HERPES ZOSTER WITHOUT COMPLICATION: Primary | ICD-10-CM

## 2021-11-01 PROCEDURE — G8417 CALC BMI ABV UP PARAM F/U: HCPCS | Performed by: INTERNAL MEDICINE

## 2021-11-01 PROCEDURE — 4040F PNEUMOC VAC/ADMIN/RCVD: CPT | Performed by: INTERNAL MEDICINE

## 2021-11-01 PROCEDURE — 1090F PRES/ABSN URINE INCON ASSESS: CPT | Performed by: INTERNAL MEDICINE

## 2021-11-01 PROCEDURE — G8484 FLU IMMUNIZE NO ADMIN: HCPCS | Performed by: INTERNAL MEDICINE

## 2021-11-01 PROCEDURE — 99213 OFFICE O/P EST LOW 20 MIN: CPT | Performed by: INTERNAL MEDICINE

## 2021-11-01 PROCEDURE — 1036F TOBACCO NON-USER: CPT | Performed by: INTERNAL MEDICINE

## 2021-11-01 PROCEDURE — G8427 DOCREV CUR MEDS BY ELIG CLIN: HCPCS | Performed by: INTERNAL MEDICINE

## 2021-11-01 PROCEDURE — G8399 PT W/DXA RESULTS DOCUMENT: HCPCS | Performed by: INTERNAL MEDICINE

## 2021-11-01 PROCEDURE — 1123F ACP DISCUSS/DSCN MKR DOCD: CPT | Performed by: INTERNAL MEDICINE

## 2021-11-01 RX ORDER — INDOMETHACIN 50 MG/1
50 CAPSULE ORAL 3 TIMES DAILY
Qty: 60 CAPSULE | Refills: 3 | Status: SHIPPED | OUTPATIENT
Start: 2021-11-01 | End: 2022-01-19 | Stop reason: ALTCHOICE

## 2021-11-01 ASSESSMENT — ENCOUNTER SYMPTOMS
APNEA: 0
COUGH: 0

## 2021-11-01 NOTE — PATIENT INSTRUCTIONS
Thank you for choosing [facility]. Please bring a current list of medications to every appointment. Please contact your pharmacy for any prescription refill(s) that you are requesting.

## 2021-11-01 NOTE — PROGRESS NOTES
Jada Wilkinson (:  1945) is a 67 y.o. female,Established patient, here for evaluation of the following chief complaint(s):  Herpes Zoster (patient c/o ? shingles- waist and back. patient noticed rash last week)         ASSESSMENT/PLAN:   Diagnosis Orders   1. Herpes zoster without complication       Outpatient Encounter Medications as of 2021   Medication Sig Dispense Refill    indomethacin (INDOCIN) 50 MG capsule Take 1 capsule by mouth 3 times daily Prn pain 60 capsule 3    allopurinol (ZYLOPRIM) 300 MG tablet TAKE 1 TABLET EVERY DAY 90 tablet 2    methylPREDNISolone (MEDROL, RODLOFO,) 4 MG tablet Take by mouth. 1 kit 0    aspirin 81 MG EC tablet Take 81 mg by mouth daily      prochlorperazine (COMPAZINE) 10 MG tablet Take 1 tablet by mouth every 6 hours as needed (nausea.) 100 tablet 3    budesonide-formoterol (SYMBICORT) 160-4.5 MCG/ACT AERO Inhale 2 puffs into the lungs 2 times daily Rinse mouth after use. 1 Inhaler 5    torsemide (DEMADEX) 5 MG tablet Take 5 mg by mouth      albuterol sulfate HFA (VENTOLIN HFA) 108 (90 Base) MCG/ACT inhaler Inhale 2 puffs into the lungs every 6 hours as needed for Wheezing 18 Inhaler 1    clobetasol (TEMOVATE) 0.05 % cream Apply topically 2 times daily 60 g 1    losartan (COZAAR) 50 MG tablet Take 0.5 tablets by mouth daily 30 tablet 0    atorvastatin (LIPITOR) 40 MG tablet Take 40 mg by mouth daily       Loratadine (CLARITIN PO) Take 10 mg by mouth daily.  atenolol (TENORMIN) 25 MG tablet Take 25 mg by mouth daily. No facility-administered encounter medications on file as of 2021. No orders of the defined types were placed in this encounter. Subjective   SUBJECTIVE/OBJECTIVE:  HPI   Chief Complaint   Patient presents with    Herpes Zoster     patient c/o ? shingles- waist and back.   patient noticed rash last week     Jada Wilkinson is a 67 y.o. female with the following history as recorded in [facility]:  Patient Active Problem List    Diagnosis Date Noted    Pulmonary emphysema ([facility] 08/01/2019    Acute pancreatitis 07/02/2019    Fever 05/30/2019    Steroid withdrawal syndrome without complication ([facility] 87/99/1243    History of pneumonia, recurrent     Collagenous colitis 04/21/2019    Sepsis ([facility] 04/20/2019    Bunion, right foot 12/12/2013    Hammertoe 12/12/2013    HTN (hypertension) 12/12/2013    Hyperlipidemia 12/12/2013    Gout, arthritis 06/07/2010    IBS (irritable bowel syndrome) 06/07/2010    CAD (coronary artery disease) 06/07/2010     Current Outpatient Medications   Medication Sig Dispense Refill    allopurinol (ZYLOPRIM) 300 MG tablet TAKE 1 TABLET EVERY DAY 90 tablet 2    methylPREDNISolone (MEDROL, RODOLFO,) 4 MG tablet Take by mouth. 1 kit 0    aspirin 81 MG EC tablet Take 81 mg by mouth daily      prochlorperazine (COMPAZINE) 10 MG tablet Take 1 tablet by mouth every 6 hours as needed (nausea.) 100 tablet 3    budesonide-formoterol (SYMBICORT) 160-4.5 MCG/ACT AERO Inhale 2 puffs into the lungs 2 times daily Rinse mouth after use. 1 Inhaler 5    torsemide (DEMADEX) 5 MG tablet Take 5 mg by mouth      albuterol sulfate HFA (VENTOLIN HFA) 108 (90 Base) MCG/ACT inhaler Inhale 2 puffs into the lungs every 6 hours as needed for Wheezing 18 Inhaler 1    clobetasol (TEMOVATE) 0.05 % cream Apply topically 2 times daily 60 g 1    losartan (COZAAR) 50 MG tablet Take 0.5 tablets by mouth daily 30 tablet 0    atorvastatin (LIPITOR) 40 MG tablet Take 40 mg by mouth daily       Loratadine (CLARITIN PO) Take 10 mg by mouth daily.  atenolol (TENORMIN) 25 MG tablet Take 25 mg by mouth daily. No current facility-administered medications for this visit.      Allergies: Tramadol, Acetaminophen, Amoxicillin-pot clavulanate [amoxicillin-pot clavulanate], and Doxycycline  Past Medical History:   Diagnosis Date    Allergic rhinitis     Asthma     Bunion, right foot 12/2013    CAD (coronary artery disease) 2010    CAD (coronary artery disease)     Gout     Hammertoe 2013    Hyperlipidemia     Hypertension     IBS (irritable bowel syndrome) 2010    Obstructive chronic bronchitis with acute bronchitis ([address] 2019    Pneumonia 2010    Psoriasis      Past Surgical History:   Procedure Laterality Date    BRONCHOSCOPY N/A 2019    BRONCHOSCOPY WITH BRONCHOALVEOLAR LAVAGE performed by Anant Raphael MD at [address]  2019    BRONCHOSCOPY DIAGNOSTIC OR CELL [address] ONLY performed by Anant Raphael MD at [address] [place] [address] [address] Right 2013    COLONOSCOPY  2009    neg.  COLONOSCOPY N/A 3/20/2019    COLONOSCOPY WITH BIOPSY performed by Bethany Echavarria MD at [address]      left    HAMMER TOE SURGERY Right 2013     Family History   Problem Relation Age of Onset    Cancer Father         GI CANCER    Crohn's Disease Sister     Lung Cancer Brother      Social History     Tobacco Use    Smoking status: Former Smoker     Packs/day: 1.00     Years: 30.00     Pack years: 30.00     Types: Cigarettes     Quit date: 3/2/1999     Years since quittin.6    Smokeless tobacco: Never Used   Substance Use Topics    Alcohol use: No       Review of Systems   Constitutional: Negative for chills and diaphoresis. HENT: Negative for congestion and postnasal drip. Eyes: Negative for visual disturbance. Respiratory: Negative for apnea and cough. Cardiovascular: Negative for chest pain. Skin:        Patient presents with:  Herpes Zoster: patient c/o ? shingles- waist and back. patient noticed rash last week            Objective   Physical Exam  Vitals and nursing note reviewed. Constitutional:       General: She is not in acute distress. Appearance: Normal appearance. Cardiovascular:      Rate and Rhythm: Normal rate and regular rhythm.       Heart sounds: No murmur heard.     Pulmonary:      Effort: No respiratory distress. Abdominal:      General: There is no distension. Tenderness: There is no abdominal tenderness. Skin:     Comments: Shingles R waist line   Neurological:      Mental Status: She is alert.                   An electronic signature was used to authenticate this note.    --Diane Gomez, DO

## 2021-11-29 ENCOUNTER — TELEPHONE (OUTPATIENT)
Dept: FAMILY MEDICINE CLINIC | Age: 76
End: 2021-11-29

## 2021-11-29 NOTE — TELEPHONE ENCOUNTER
Pt is wanting to know when she could get her covid booster. Pt had shingles, all the scabs are gone but the shingles are still there.      Pl advise [phone] (home)

## 2021-12-13 ENCOUNTER — TELEPHONE (OUTPATIENT)
Dept: FAMILY MEDICINE CLINIC | Age: 76
End: 2021-12-13

## 2021-12-13 NOTE — TELEPHONE ENCOUNTER
Pt has had shingles now for about 6 weeks, they are fading. The scabs fell off about a week ago, but she still has them, they are painful, burn and she feels like something is crawling in them. and would like to know if she could take anything else for this. Pt stated that she had to stop taking the pain meds b/c it was upsetting her stomach.      Pl advise   [phone]

## 2021-12-27 ENCOUNTER — OFFICE VISIT (OUTPATIENT)
Dept: FAMILY MEDICINE CLINIC | Age: 76
End: 2021-12-27
Payer: MEDICARE

## 2021-12-27 VITALS — BODY MASS INDEX: 26.9 KG/M2 | TEMPERATURE: 97.4 F | HEIGHT: 63 IN | WEIGHT: 151.8 LBS

## 2021-12-27 DIAGNOSIS — L98.9 SKIN LESION: Primary | ICD-10-CM

## 2021-12-27 PROCEDURE — 99213 OFFICE O/P EST LOW 20 MIN: CPT | Performed by: INTERNAL MEDICINE

## 2021-12-27 PROCEDURE — 1090F PRES/ABSN URINE INCON ASSESS: CPT | Performed by: INTERNAL MEDICINE

## 2021-12-27 PROCEDURE — G8399 PT W/DXA RESULTS DOCUMENT: HCPCS | Performed by: INTERNAL MEDICINE

## 2021-12-27 PROCEDURE — 4040F PNEUMOC VAC/ADMIN/RCVD: CPT | Performed by: INTERNAL MEDICINE

## 2021-12-27 PROCEDURE — G8417 CALC BMI ABV UP PARAM F/U: HCPCS | Performed by: INTERNAL MEDICINE

## 2021-12-27 PROCEDURE — 1123F ACP DISCUSS/DSCN MKR DOCD: CPT | Performed by: INTERNAL MEDICINE

## 2021-12-27 PROCEDURE — 1036F TOBACCO NON-USER: CPT | Performed by: INTERNAL MEDICINE

## 2021-12-27 PROCEDURE — G8427 DOCREV CUR MEDS BY ELIG CLIN: HCPCS | Performed by: INTERNAL MEDICINE

## 2021-12-27 PROCEDURE — G8484 FLU IMMUNIZE NO ADMIN: HCPCS | Performed by: INTERNAL MEDICINE

## 2021-12-27 RX ORDER — CEPHALEXIN 500 MG/1
500 CAPSULE ORAL 3 TIMES DAILY
Qty: 30 CAPSULE | Refills: 0 | Status: SHIPPED | OUTPATIENT
Start: 2021-12-27 | End: 2022-01-19 | Stop reason: ALTCHOICE

## 2021-12-27 ASSESSMENT — ENCOUNTER SYMPTOMS
APNEA: 0
COUGH: 0

## 2021-12-27 NOTE — PROGRESS NOTES
Nikolai Richey (:  1945) is a 67 y.o. female,Established patient, here for evaluation of the following chief complaint(s):  Skin Problem (patient request to check mole on her back- painful since 2021)         ASSESSMENT/PLAN:     Diagnosis Orders   1. Skin lesion     infected  Outpatient Encounter Medications as of 2021   Medication Sig Dispense Refill    cephALEXin (KEFLEX) 500 MG capsule Take 1 capsule by mouth 3 times daily 30 capsule 0    indomethacin (INDOCIN) 50 MG capsule Take 1 capsule by mouth 3 times daily Prn pain 60 capsule 3    allopurinol (ZYLOPRIM) 300 MG tablet TAKE 1 TABLET EVERY DAY 90 tablet 2    aspirin 81 MG EC tablet Take 81 mg by mouth daily      budesonide-formoterol (SYMBICORT) 160-4.5 MCG/ACT AERO Inhale 2 puffs into the lungs 2 times daily Rinse mouth after use. 1 Inhaler 5    torsemide (DEMADEX) 5 MG tablet Take 5 mg by mouth      albuterol sulfate HFA (VENTOLIN HFA) 108 (90 Base) MCG/ACT inhaler Inhale 2 puffs into the lungs every 6 hours as needed for Wheezing 18 Inhaler 1    clobetasol (TEMOVATE) 0.05 % cream Apply topically 2 times daily 60 g 1    losartan (COZAAR) 50 MG tablet Take 0.5 tablets by mouth daily 30 tablet 0    atorvastatin (LIPITOR) 40 MG tablet Take 40 mg by mouth daily       Loratadine (CLARITIN PO) Take 10 mg by mouth daily.  atenolol (TENORMIN) 25 MG tablet Take 25 mg by mouth daily.  [DISCONTINUED] methylPREDNISolone (MEDROL, RODOLFO,) 4 MG tablet Take by mouth. 1 kit 0    prochlorperazine (COMPAZINE) 10 MG tablet Take 1 tablet by mouth every 6 hours as needed (nausea.) (Patient not taking: Reported on 2021) 100 tablet 3     No facility-administered encounter medications on file as of 2021. No orders of the defined types were placed in this encounter.   refer to dr Mariann Bhatti:  HPI   Chief Complaint   Patient presents with    Skin Problem     patient request to check mole on her back- painful since 12/24/2021     Uche Larson is a 67 y.o. female with the following history as recorded in [facility]:  Patient Active Problem List    Diagnosis Date Noted    Pulmonary emphysema ([facility] 08/01/2019    Acute pancreatitis 07/02/2019    Fever 05/30/2019    Steroid withdrawal syndrome without complication ([facility] 55/74/9230    History of pneumonia, recurrent     Collagenous colitis 04/21/2019    Sepsis ([facility] 04/20/2019    Bunion, right foot 12/12/2013    Hammertoe 12/12/2013    HTN (hypertension) 12/12/2013    Hyperlipidemia 12/12/2013    Gout, arthritis 06/07/2010    IBS (irritable bowel syndrome) 06/07/2010    CAD (coronary artery disease) 06/07/2010     Current Outpatient Medications   Medication Sig Dispense Refill    indomethacin (INDOCIN) 50 MG capsule Take 1 capsule by mouth 3 times daily Prn pain 60 capsule 3    allopurinol (ZYLOPRIM) 300 MG tablet TAKE 1 TABLET EVERY DAY 90 tablet 2    aspirin 81 MG EC tablet Take 81 mg by mouth daily      budesonide-formoterol (SYMBICORT) 160-4.5 MCG/ACT AERO Inhale 2 puffs into the lungs 2 times daily Rinse mouth after use. 1 Inhaler 5    torsemide (DEMADEX) 5 MG tablet Take 5 mg by mouth      albuterol sulfate HFA (VENTOLIN HFA) 108 (90 Base) MCG/ACT inhaler Inhale 2 puffs into the lungs every 6 hours as needed for Wheezing 18 Inhaler 1    clobetasol (TEMOVATE) 0.05 % cream Apply topically 2 times daily 60 g 1    losartan (COZAAR) 50 MG tablet Take 0.5 tablets by mouth daily 30 tablet 0    atorvastatin (LIPITOR) 40 MG tablet Take 40 mg by mouth daily       Loratadine (CLARITIN PO) Take 10 mg by mouth daily.  atenolol (TENORMIN) 25 MG tablet Take 25 mg by mouth daily.  prochlorperazine (COMPAZINE) 10 MG tablet Take 1 tablet by mouth every 6 hours as needed (nausea.) (Patient not taking: Reported on 12/27/2021) 100 tablet 3     No current facility-administered medications for this visit.      Allergies: Tramadol, Acetaminophen, Amoxicillin-pot clavulanate [amoxicillin-pot clavulanate], Aleve [naproxen], and Doxycycline  Past Medical History:   Diagnosis Date    Allergic rhinitis     Asthma     Bunion, right foot 2013    CAD (coronary artery disease) 2010    CAD (coronary artery disease)     Gout     Hammertoe 2013    Hyperlipidemia     Hypertension     IBS (irritable bowel syndrome) 2010    Obstructive chronic bronchitis with acute bronchitis ([address] 2019    Pneumonia 2010    Psoriasis      Past Surgical History:   Procedure Laterality Date    BRONCHOSCOPY N/A 2019    BRONCHOSCOPY WITH BRONCHOALVEOLAR LAVAGE performed by Megan Lezama MD at [address] [facility]  2019    BRONCHOSCOPY DIAGNOSTIC OR CELL [address] ONLY performed by Megan Lezama MD at [address] [place] [address] [address] Right 2013    COLONOSCOPY      neg.  COLONOSCOPY N/A 3/20/2019    COLONOSCOPY WITH BIOPSY performed by Martha Thompson MD at [address]      left    HAMMER TOE SURGERY Right 2013     Family History   Problem Relation Age of Onset    Cancer Father         GI CANCER    Crohn's Disease Sister     Lung Cancer Brother      Social History     Tobacco Use    Smoking status: Former Smoker     Packs/day: 1.00     Years: 30.00     Pack years: 30.00     Types: Cigarettes     Quit date: 3/2/1999     Years since quittin.8    Smokeless tobacco: Never Used   Substance Use Topics    Alcohol use: No       Review of Systems   Constitutional: Negative for chills, diaphoresis and fatigue. HENT: Negative for congestion and postnasal drip. Respiratory: Negative for apnea and cough. Cardiovascular: Negative for chest pain. Skin:        Patient presents with:  Skin Problem: patient request to check mole on her back- painful since 2021            Objective   Physical Exam  Vitals and nursing note reviewed. Constitutional:       Appearance: Normal appearance. HENT:      Head: Normocephalic and atraumatic. Cardiovascular:      Rate and Rhythm: Normal rate and regular rhythm. Pulmonary:      Effort: No respiratory distress. Breath sounds: No wheezing. Abdominal:      General: There is no distension. Tenderness: There is no abdominal tenderness. Skin:     Coloration: Skin is not jaundiced. Comments: Infected skin lesion back at bra line . Neurological:      General: No focal deficit present. Mental Status: She is alert and oriented to person, place, and time.                   An electronic signature was used to authenticate this note.    --Lashay Thomas, DO

## 2022-01-19 NOTE — PROGRESS NOTES
C-Difficile admission screening and protocol:     * Admitted with diarrhea? YES____    NO___X__     *Prior history of C-Diff. In last 3 months? YES____   NO___X__     *Antibiotic use in the past 6-8 weeks? NO______YES__X____                 If yes which  ANTIBIOTIC AND REASON__X--infected mole____     *Prior hospitalization or nursing home in the last month?  YES____   NO__X__

## 2022-01-19 NOTE — PROGRESS NOTES
[facility] [facility] time_____1155_______        Surgery time___1325_________    Take the following medications with a sip of water: Follow your MD/Surgeons pre-procedure instructions regarding your medications    Do not eat or drink anything after 12:00 midnight prior to your surgery. This includes water chewing gum, mints and ice chips. You may brush your teeth and gargle the morning of your surgery, but do not swallow the water     Please see your family doctor/pediatrician for a history and physical and/or concerning medications. Bring any test results/reports from your physicians office. If you are under the care of a heart doctor or specialist doctor, please be aware that you may be asked to them for clearance    You may be asked to stop blood thinners such as Coumadin, Plavix, Fragmin, Lovenox, etc., or any anti-inflammatories such as:  Aspirin, Ibuprofen, Advil, Naproxen prior to your surgery. We also ask that you stop any OTC medications such as fish oil, vitamin E, glucosamine, garlic, Multivitamins, COQ 10, etc.    We ask that you do not smoke 24 hours prior to surgery  We ask that you do not  drink any alcoholic beverages 24 hours prior to surgery     You must make arrangements for a responsible adult to take you home after your surgery. For your safety you will not be allowed to leave alone or drive yourself home. Your surgery will be cancelled if you do not have a ride home. Also for your safety, it is strongly suggested that someone stay with you the first 24 hours after your surgery. A parent or legal guardian must accompany a child scheduled for surgery and plan to stay at the hospital until the child is discharged. Please do not bring other children with you. For your comfort, please wear simple loose fitting clothing to the hospital.  Please do not bring valuables.     Do not wear any make-up or nail polish on your fingers or toes      For your safety, please do not wear any jewelry or body piercing's on the day of surgery. All jewelry must be removed. If you have dentures, they will be removed before going to operating room. For your convenience, we will provide you with a container. If you wear contact lenses or glasses, they will be removed, please bring a case for them. If you have a living will and a durable power of  for healthcare, please bring in a copy. As part of our patient safety program to minimize surgical site infections, we ask you to do the following:    · Please notify your surgeon if you develop any illness between         now and the  day of your surgery. · This includes a cough, cold, fever, sore throat, nausea,         or vomiting, and diarrhea, etc.  ·  Please notify your surgeon if you experience dizziness, shortness         of breath or blurred vision between now and the time of your surgery. Do not shave your operative site 96 hours prior to surgery. For face and neck surgery, men may use an electric razor 48 hours   prior to surgery. You may shower the night before surgery or the morning of   your surgery with an antibacterial soap. You will need to bring a photo ID and insurance card    [facility] has an onsite pharmacy, would you like to utilize our pharmacy     If you will be staying overnight and use a C-pap machine, please bring   your C-pap to hospital     Our goal is to provide you with excellent care, therefore, visitors will be limited to two(2) in the room at a time so that we may focus on providing this care for you. Please contact pre-admission testing if you have any further questions. [facility] phone number:  [phone] [facility] fax number:  [phone]  Please note these are generalized instructions for all surgical cases, you may be provided with more specific instructions according to your surgery.

## 2022-01-25 ENCOUNTER — ANESTHESIA EVENT (OUTPATIENT)
Dept: OPERATING ROOM | Age: 77
End: 2022-01-25
Payer: MEDICARE

## 2022-01-26 ENCOUNTER — HOSPITAL ENCOUNTER (OUTPATIENT)
Age: 77
Setting detail: OUTPATIENT SURGERY
Discharge: HOME | End: 2022-01-26
Attending: FAMILY MEDICINE | Admitting: FAMILY MEDICINE
Payer: MEDICARE

## 2022-01-26 ENCOUNTER — ANESTHESIA (OUTPATIENT)
Dept: OPERATING ROOM | Age: 77
End: 2022-01-26
Payer: MEDICARE

## 2022-01-26 VITALS
HEART RATE: 60 BPM | RESPIRATION RATE: 18 BRPM | SYSTOLIC BLOOD PRESSURE: 134 MMHG | OXYGEN SATURATION: 98 % | BODY MASS INDEX: 26.75 KG/M2 | TEMPERATURE: 97.2 F | DIASTOLIC BLOOD PRESSURE: 64 MMHG | HEIGHT: 63 IN | WEIGHT: 151 LBS

## 2022-01-26 VITALS
SYSTOLIC BLOOD PRESSURE: 99 MMHG | DIASTOLIC BLOOD PRESSURE: 50 MMHG | TEMPERATURE: 95 F | RESPIRATION RATE: 12 BRPM | OXYGEN SATURATION: 100 %

## 2022-01-26 PROCEDURE — 6370000000 HC RX 637 (ALT 250 FOR IP): Performed by: FAMILY MEDICINE

## 2022-01-26 PROCEDURE — 2580000003 HC RX 258: Performed by: FAMILY MEDICINE

## 2022-01-26 PROCEDURE — 6360000002 HC RX W HCPCS: Performed by: NURSE ANESTHETIST, CERTIFIED REGISTERED

## 2022-01-26 PROCEDURE — 3600000012 HC SURGERY LEVEL 2 ADDTL 15MIN: Performed by: FAMILY MEDICINE

## 2022-01-26 PROCEDURE — 6360000002 HC RX W HCPCS: Performed by: FAMILY MEDICINE

## 2022-01-26 PROCEDURE — 7100000011 HC PHASE II RECOVERY - ADDTL 15 MIN: Performed by: FAMILY MEDICINE

## 2022-01-26 PROCEDURE — 2580000003 HC RX 258: Performed by: ANESTHESIOLOGY

## 2022-01-26 PROCEDURE — 2500000003 HC RX 250 WO HCPCS: Performed by: FAMILY MEDICINE

## 2022-01-26 PROCEDURE — 3700000000 HC ANESTHESIA ATTENDED CARE: Performed by: FAMILY MEDICINE

## 2022-01-26 PROCEDURE — 88305 TISSUE EXAM BY PATHOLOGIST: CPT

## 2022-01-26 PROCEDURE — 3600000002 HC SURGERY LEVEL 2 BASE: Performed by: FAMILY MEDICINE

## 2022-01-26 PROCEDURE — 2709999900 HC NON-CHARGEABLE SUPPLY: Performed by: FAMILY MEDICINE

## 2022-01-26 PROCEDURE — 7100000001 HC PACU RECOVERY - ADDTL 15 MIN: Performed by: FAMILY MEDICINE

## 2022-01-26 PROCEDURE — 3700000001 HC ADD 15 MINUTES (ANESTHESIA): Performed by: FAMILY MEDICINE

## 2022-01-26 PROCEDURE — 7100000000 HC PACU RECOVERY - FIRST 15 MIN: Performed by: FAMILY MEDICINE

## 2022-01-26 PROCEDURE — A4217 STERILE WATER/SALINE, 500 ML: HCPCS | Performed by: FAMILY MEDICINE

## 2022-01-26 PROCEDURE — 7100000010 HC PHASE II RECOVERY - FIRST 15 MIN: Performed by: FAMILY MEDICINE

## 2022-01-26 RX ORDER — SODIUM CHLORIDE 0.9 % (FLUSH) 0.9 %
5-40 SYRINGE (ML) INJECTION EVERY 12 HOURS SCHEDULED
Status: DISCONTINUED | OUTPATIENT
Start: 2022-01-26 | End: 2022-01-26 | Stop reason: HOSPADM

## 2022-01-26 RX ORDER — DIPHENHYDRAMINE HYDROCHLORIDE 50 MG/ML
12.5 INJECTION INTRAMUSCULAR; INTRAVENOUS
Status: DISCONTINUED | OUTPATIENT
Start: 2022-01-26 | End: 2022-01-26 | Stop reason: HOSPADM

## 2022-01-26 RX ORDER — MIDAZOLAM HYDROCHLORIDE 1 MG/ML
INJECTION INTRAMUSCULAR; INTRAVENOUS PRN
Status: DISCONTINUED | OUTPATIENT
Start: 2022-01-26 | End: 2022-01-26 | Stop reason: SDUPTHER

## 2022-01-26 RX ORDER — SODIUM CHLORIDE 9 MG/ML
25 INJECTION, SOLUTION INTRAVENOUS PRN
Status: DISCONTINUED | OUTPATIENT
Start: 2022-01-26 | End: 2022-01-26 | Stop reason: HOSPADM

## 2022-01-26 RX ORDER — SODIUM CHLORIDE 9 MG/ML
INJECTION, SOLUTION INTRAVENOUS CONTINUOUS
Status: DISCONTINUED | OUTPATIENT
Start: 2022-01-26 | End: 2022-01-26 | Stop reason: HOSPADM

## 2022-01-26 RX ORDER — LABETALOL HYDROCHLORIDE 5 MG/ML
5 INJECTION, SOLUTION INTRAVENOUS EVERY 10 MIN PRN
Status: DISCONTINUED | OUTPATIENT
Start: 2022-01-26 | End: 2022-01-26 | Stop reason: HOSPADM

## 2022-01-26 RX ORDER — BACITRACIN ZINC AND POLYMYXIN B SULFATE 500; 1000 [USP'U]/G; [USP'U]/G
OINTMENT TOPICAL
Status: COMPLETED | OUTPATIENT
Start: 2022-01-26 | End: 2022-01-26

## 2022-01-26 RX ORDER — FENTANYL CITRATE 50 UG/ML
50 INJECTION, SOLUTION INTRAMUSCULAR; INTRAVENOUS EVERY 5 MIN PRN
Status: DISCONTINUED | OUTPATIENT
Start: 2022-01-26 | End: 2022-01-26 | Stop reason: HOSPADM

## 2022-01-26 RX ORDER — FENTANYL CITRATE 50 UG/ML
INJECTION, SOLUTION INTRAMUSCULAR; INTRAVENOUS PRN
Status: DISCONTINUED | OUTPATIENT
Start: 2022-01-26 | End: 2022-01-26 | Stop reason: SDUPTHER

## 2022-01-26 RX ORDER — PROMETHAZINE HYDROCHLORIDE 25 MG/ML
6.25 INJECTION, SOLUTION INTRAMUSCULAR; INTRAVENOUS
Status: DISCONTINUED | OUTPATIENT
Start: 2022-01-26 | End: 2022-01-26 | Stop reason: HOSPADM

## 2022-01-26 RX ORDER — ONDANSETRON 2 MG/ML
4 INJECTION INTRAMUSCULAR; INTRAVENOUS
Status: DISCONTINUED | OUTPATIENT
Start: 2022-01-26 | End: 2022-01-26 | Stop reason: HOSPADM

## 2022-01-26 RX ORDER — MEPERIDINE HYDROCHLORIDE 25 MG/ML
12.5 INJECTION INTRAMUSCULAR; INTRAVENOUS; SUBCUTANEOUS EVERY 5 MIN PRN
Status: DISCONTINUED | OUTPATIENT
Start: 2022-01-26 | End: 2022-01-26 | Stop reason: HOSPADM

## 2022-01-26 RX ORDER — SODIUM CHLORIDE 0.9 % (FLUSH) 0.9 %
5-40 SYRINGE (ML) INJECTION PRN
Status: DISCONTINUED | OUTPATIENT
Start: 2022-01-26 | End: 2022-01-26 | Stop reason: HOSPADM

## 2022-01-26 RX ORDER — PROPOFOL 10 MG/ML
INJECTION, EMULSION INTRAVENOUS CONTINUOUS PRN
Status: DISCONTINUED | OUTPATIENT
Start: 2022-01-26 | End: 2022-01-26 | Stop reason: SDUPTHER

## 2022-01-26 RX ORDER — FENTANYL CITRATE 50 UG/ML
25 INJECTION, SOLUTION INTRAMUSCULAR; INTRAVENOUS EVERY 5 MIN PRN
Status: DISCONTINUED | OUTPATIENT
Start: 2022-01-26 | End: 2022-01-26 | Stop reason: HOSPADM

## 2022-01-26 RX ADMIN — FENTANYL CITRATE 50 MCG: 50 INJECTION INTRAMUSCULAR; INTRAVENOUS at 12:09

## 2022-01-26 RX ADMIN — SODIUM CHLORIDE: 9 INJECTION, SOLUTION INTRAVENOUS at 13:00

## 2022-01-26 RX ADMIN — MIDAZOLAM 1 MG: 1 INJECTION INTRAMUSCULAR; INTRAVENOUS at 12:09

## 2022-01-26 RX ADMIN — SODIUM CHLORIDE: 9 INJECTION, SOLUTION INTRAVENOUS at 11:06

## 2022-01-26 RX ADMIN — PROPOFOL 300 MCG/KG/MIN: 10 INJECTION, EMULSION INTRAVENOUS at 12:09

## 2022-01-26 ASSESSMENT — PULMONARY FUNCTION TESTS
PIF_VALUE: 1
PIF_VALUE: 2
PIF_VALUE: 1
PIF_VALUE: 0
PIF_VALUE: 1

## 2022-01-26 ASSESSMENT — PAIN SCALES - GENERAL
PAINLEVEL_OUTOF10: 0
PAINLEVEL_OUTOF10: 0

## 2022-01-26 ASSESSMENT — PAIN - FUNCTIONAL ASSESSMENT: PAIN_FUNCTIONAL_ASSESSMENT: 0-10

## 2022-01-26 ASSESSMENT — LIFESTYLE VARIABLES: SMOKING_STATUS: 0

## 2022-01-26 NOTE — H&P
Date of Surgery Update:  Mariaa Martin was seen, history and physical examination reviewed, and patient examined by me today. Sites for surgery are marked, including my initials, ISHAAN. There have been no significant clinical changes since the completion of the above history and physical. The risk, benefits, and alternatives of the proposed procedure have been explained to the patient (or appropriate guardian) and understanding verbalized. All questions answered. Patient wishes to proceed.       Signed By: Natalia Gonzalez MD     January 26, 2022 11:49 AM

## 2022-01-26 NOTE — ANESTHESIA POSTPROCEDURE EVALUATION
Department of Anesthesiology  Postprocedure Note    Patient: Lamont Garibay  MRN: [id number]  YOB: 1945  Date of evaluation: 1/26/2022  Time:  2:14 PM     Procedure Summary     Date: 01/26/22 Room / Location: [address]    Anesthesia Start: 8080 Anesthesia Stop: 9451    Procedure: EXCISION MULTIPLE BACK LESIONS, MULTIPLE LAYERED INTERMEDIATE INCISION REPAIRS (N/A Back) Diagnosis: (MULTIPLE BACK LESIONS)    Surgeons: Arabella Zavala MD Responsible Provider: Bibiana Alexander MD    Anesthesia Type: MAC ASA Status: 3          Anesthesia Type: MAC    Jose Alberto Phase I: Jose Alberto Score: 10    Jose Alberto Phase II: Jose Alberto Score: 10    Last vitals: Reviewed and per EMR flowsheets.        Anesthesia Post Evaluation    Patient location during evaluation: PACU  Level of consciousness: awake and alert  Airway patency: patent  Nausea & Vomiting: no nausea and no vomiting  Complications: no  Cardiovascular status: blood pressure returned to baseline  Respiratory status: acceptable  Hydration status: euvolemic  Comments: Postoperative Anesthesia Note    Name:    Lamont Garibay  MRN:      [id number]    Patient Vitals in the past 12 hrs:  01/26/22 1341, BP:134/64, Temp:97.2 °F (36.2 °C), Temp src:Temporal, Pulse:60, Resp:18, SpO2:98 %  01/26/22 1334, BP:(!) 169/66, Pulse:69, Resp:20, SpO2:99 %  01/26/22 1327, BP:(!) 174/92, Pulse:69, Resp:18, SpO2:97 %  01/26/22 1325, BP:(!) 142/58, Pulse:66, Resp:15, SpO2:100 %  01/26/22 1323, Pulse:59, Resp:16, SpO2:100 %  01/26/22 1318, BP:(!) 86/58, Pulse:69, Resp:21, SpO2:100 %  01/26/22 1315, BP:(!) 131/54, Pulse:64, Resp:15, SpO2:100 %  01/26/22 1311, BP:(!) 124/92, Pulse:66, Resp:15, SpO2:98 %  01/26/22 1307, BP:(!) 124/92, Temp:97 °F (36.1 °C), Temp src:Temporal, Pulse:69, Resp:10, SpO2:100 %  01/26/22 1107, BP:135/75, Temp:97.2 °F (36.2 °C), Temp src:Temporal, Pulse:72, Resp:18, SpO2:96 %     LABS:    CBC  Lab Results       Component Value               Date/Time                  WBC                      9.1                 04/02/2021 02:11 PM        HGB                      12.4                04/02/2021 02:11 PM        HCT                      36.6                04/02/2021 02:11 PM        PLT                      365                 04/02/2021 02:11 PM   RENAL  Lab Results       Component                Value               Date/Time                  NA                       139                 07/04/2019 05:32 AM        K                        3.7                 07/04/2019 05:11 PM        K                        3.8                 07/02/2019 07:27 PM        CL                       105                 07/04/2019 05:32 AM        CO2                      22                  07/04/2019 05:32 AM        BUN                      38 (H)              07/04/2019 05:32 AM        CREATININE               1.1                 07/04/2019 05:32 AM        GLUCOSE                  85                  07/04/2019 05:32 AM   COAGS  No results found for: PROTIME, INR, APTT    Intake & Output:  @24HRIO@    Nausea & Vomiting:  No    Level of Consciousness:  Awake    Pain Assessment:  Adequate analgesia    Anesthesia Complications:  No apparent anesthetic complications    SUMMARY      Vital signs stable  OK to discharge from Stage I post anesthesia care.   Care transferred from Anesthesiology department on discharge from perioperative area

## 2022-01-26 NOTE — H&P
Plastic Surgery Consult    Patient: Gricel Lewis MRN: [id number]     YOB: 1945  Age: 67 y.o. Sex: female    Unit: [facility] OR Room/Bed: OR/NONE Location: [address] [place] [address]     Admitting Physician: Brian Renee     Primary Care Physician: Anibal Dockery DO      Chief Complaint/HPI:   Multiple skin lesions on back. Date of Consultation: 2022    Subjective:      Gricel Lewis is a 67 y.o. female who was referred for evaluation and treatment of multiple skin lesions on back.     Patient Active Problem List    Diagnosis Date Noted    Pulmonary emph[facility]sema ([facility] 2019    Acute pancreatitis 2019    Fever 2019    [facility]     History of pneumonia, recurrent     Collagenous colitis 2019    Sepsis [facility] 2019    Bunion, right foot 2013    Hammertoe 2013    HTN (hypertension) 2013    Hyperlipidemia 2013    Gout, arthritis 2010    IBS (irritable bowel syndrome) 2010    CAD (coronary artery disease) 2010     Family History   Problem Relation Age of Onset    Cancer Father         GI CANCER    No Known Problems Mother     Crohn's Disease Sister     Lung Cancer Brother       Social History     Tobacco Use    Smoking status: Former Smoker     Packs/day: 1.00     Years: 30.00     Pack years: 30.00     Types: Cigarettes     Quit date: 3/2/1999     Years since quittin.9    Smokeless tobacco: Never Used   Substance Use Topics    Alcohol use: No     Past Medical History:   Diagnosis Date    Allergic rhinitis     Arthritis     gout    Asthma     CAD (coronary artery disease) 2010    CAD (coronary artery disease)     Colitis, collagenous     COVID-19 2021    mild symptons    Hepatitis A     in her 29's    History of peptic ulcer     History of pneumonia     Hyperlipidemia     Hypertension     IBS (irritable bowel syndrome) 2010    Obstructive chronic bronchitis with acute bronchitis ([facility] 4/1/2019    Psoriasis       Past Surgical History:   Procedure Laterality Date    BRONCHOSCOPY N/A 4/23/2019    BRONCHOSCOPY WITH BRONCHOALVEOLAR LAVAGE performed by Sabas Langford MD at [address] [facility]  4/23/2019    BRONCHOSCOPY DIAGNOSTIC OR CELL [address] ONLY performed by Sabas Langford MD at [address] [facility] [address] Right 12/12/2013    COLONOSCOPY  2009    neg.  COLONOSCOPY N/A 3/20/2019    COLONOSCOPY WITH BIOPSY performed by Kristan Jacinto MD at [address] Bilateral 2010    cataract removal    HAMMER TOE SURGERY Right 12/12/2013      Prior to Admission medications    Medication Sig Start Date End Date Taking? Authorizing Provider   allopurinol (ZYLOPRIM) 300 MG tablet TAKE 1 TABLET EVERY DAY 8/27/21  Yes Miguel Izquierdo,    aspirin 81 MG EC tablet Take 81 mg by mouth daily   Yes Historical Provider, MD   prochlorperazine (COMPAZINE) 10 MG tablet Take 1 tablet by mouth every 6 hours as needed (nausea.) 2/22/21  Yes Isael Tidwell MD   torsemide (DEMADEX) 5 MG tablet Take 5 mg by mouth daily    Yes Historical Provider, MD   losartan (COZAAR) 50 MG tablet Take 0.5 tablets by mouth daily 3/30/17  Yes Robert Chirinos MD   atorvastatin (LIPITOR) 40 MG tablet Take 40 mg by mouth daily  12/1/14  Yes Historical Provider, MD   Loratadine (CLARITIN PO) Take 10 mg by mouth daily. Yes Historical Provider, MD   atenolol (TENORMIN) 25 MG tablet Take 25 mg by mouth daily. Yes Historical Provider, MD   budesonide-formoterol (SYMBICORT) 160-4.5 MCG/ACT AERO Inhale 2 puffs into the lungs 2 times daily Rinse mouth after use.  12/29/20   Isael Tidwell MD   albuterol sulfate HFA (VENTOLIN HFA) 108 (90 Base) MCG/ACT inhaler Inhale 2 puffs into the lungs every 6 hours as needed for Wheezing 6/14/19   Isael Tidwell MD     Allergies   Allergen Reactions    Doxycycline Other (See Comments)     pancreatitis    Acetaminophen Swelling    Aleve [Naproxen] Hives    Amoxicillin-Pot Clavulanate Nausea And Vomiting    Tramadol Swelling        Review of Systems:   Pertinent items are noted in HPI. Objective:       Vital signs shows that the patient is afebrile and her vital signs are stable. Scheduled Meds:   sodium chloride flush  5-40 mL IntraVENous 2 times per day     Continuous Infusions:   sodium chloride 75 mL/hr at 01/26/22 1106    sodium chloride       PRN Meds:sodium chloride flush, sodium chloride, meperidine, HYDROmorphone, HYDROmorphone, fentanNYL, fentanNYL, ondansetron, promethazine, diphenhydrAMINE, labetalol          Physical Exam:   Constitutional: alert, no acute distress, well hydrated, well developed, well nourished. well groomed appropriate for age  Skin: normal color, no rashes, no unusual bruising. Palpation of scalp skin and inspection of body hair show no clinically significant lesions. Inspection and/or palpation of skin and subcutaneous tissues of head shows no clinically significant lesions. Inspection and/or or palpation of skin and subcutaneous tissues of neck shows no clinically significant lesions. Inspection and/or palpation of skin and subcutaneous tissues of abdomen shows no clinically significant lesions. Inspection and/or palpation of skin and subcutaneous tissues of genitalia, groin, and buttocks declined by patient. Inspection and/or palpation of skin and subcutaneous tissues of back shows multiple skin lesions, otherwise no clinically significant lesions. Inspection and/or palpation of skin and subcutaneous tissues of chest shows no clinically significant lesions. Inspection and/or palpation of skin and subcutaneous tissues of RIGHT upper extremity shows no clinically significant lesions. Inspection and/orpalpation of skin and subcutaneous tissues of LEFT upper extremity shows no clinically significant lesions.   Inspection and/or palpation of skin and subcutaneous tissues of RIGHT lower extremity shows no clinically significant lesions. Inspection and/or palpation of skin and subcutaneous tissues of LEFT lower extremity shows no clinically significant lesions. Apocrine and eccrine sweat gland regions normal with no evidence of hyperhidrosis, chromhidrosis, nor bromhidrosis. No lymphadenopathy in bilateral cervical nor axillary lymph node basins. Skin normal turgor, normal capillary refill, and warm to touch. No cyanosis, clubbing, inflammation,  ischemia, infections, nodes, nor petechiae in digits nor nail beds in four extremties. Eyes: pupils equal, no injection, no icterus. conjunctiva are moist and clear bilaterally, normal lid motility bilaterally, extraoccular muscles intact bilaterally, pupils equal, round & reactive to light bilaterally, normal light reaction, and vision grossly normal  Ears/Nose/Throat: external ears normal, hearing normal, external nose normal, tongue normal. maxillary and mandibular teeth normal upper and lower lips normal maxillary and mandibular gums normal mucous membranes normal hard and soft palates normal tonsils normal posterior pharynx normal.  Neck: Supple, no adenopathy, no masses, no thyromegaly, no thyroid tenderness, nor nodules. Chest: normal chest inspection. Peripheral edema is present in the bilateral lower extremities. Varicose veins in bilateral legs. Pulses intact in four extremities. Skin warm. Abdomen: Anal and genitourinary exams declined by patient. No masses nor organomegaly. Neuro: cranial nerves grossly intact, sensation intact bilaterally. Psych: affect and mood appropriate. normal interaction. oriented to person, place time, and circumstance good eye contact. Assessment:     Active Problems:    * No active hospital problems. *  Resolved Problems:    * No resolved hospital problems.  *        Plan:     Excision multiple skin lesions on back, out-patient, [facility]  Informed operative consent obtained. Follow up in my office one week post-op. Medical Decision Making:  High in severity, high complexity, with decision for surgical versus non-surgical therapy and anesthetic considerations. Records were reviewed. Morbidity and mortality risk is high severity. Counseling: Diagnostic results show prognosis is good. Instructions for Management: Management of chronic diseases by [facility]  Excision multiple skin lesions on back, out-patient, [facility]  Informed operative consent obtained. Follow up in my office one week post-op. Nature of Presenting Problem: High severity.           Carlitos Rojas MD                        Signed By: Carlitos Rojas MD     January 26, 2022

## 2022-01-26 NOTE — PROGRESS NOTES
Pt arrived to the PACU from the OR awake, alert and oriented. Denies pain. Dressing dry and intact. VSS. Will continue to monitor.

## 2022-01-26 NOTE — BRIEF OP NOTE
Brief Postoperative Note      Patient: Kehinde Torres  YOB: 1945  MRN: [id number]    Date of Procedure: 1/26/2022    Pre-Op Diagnosis: MULTIPLE BACK LESIONS    Post-Op Diagnosis: Same       Procedure(s):  EXCISION MULTIPLE BACK LESIONS, MULTIPLE LAYERED INTERMEDIATE INCISION REPAIRS , 1.6 cm Right back, 3 cm Left back, curette one back lesions  Surgeon(s):  Richa Acosta MD    Assistant:  Surgical Assistant: Jenny Arambula    Anesthesia: Monitor Anesthesia Care    Estimated Blood Loss (mL): less than 10 ml    Complications: None    Specimens:   ID Type Source Tests Collected by Time Destination   A : Left back lession Tissue Tissue SURGICAL PATHOLOGY Rank Nedra Acosta MD 1/26/2022 8376    B : Right back leassion Tissue Tissue SURGICAL PATHOLOGY Rank Nedra Acosta MD 1/26/2022 1233        Implants:  * No implants in log *      Drains: * No LDAs found *    Findings: as above    Electronically signed by Mario Ivan MD on 1/26/2022 at 1:02 PM

## 2022-01-26 NOTE — ANESTHESIA PRE PROCEDURE
Department of Anesthesiology  Preprocedure Note       Name:  Adama Rios   Age:  67 y.o.  :  1945                                          MRN:  [id number]         Date:  2022      Surgeon: Alphonso Dong):  Luiza Kiser MD    Procedure: Procedure(s):  EXCISION MULTIPLE BACK LESIONS, MULTIPLE LAYERED INTERMEDIATE INCISION REPAIRS    Medications prior to admission:   Prior to Admission medications    Medication Sig Start Date End Date Taking? Authorizing Provider   allopurinol (ZYLOPRIM) 300 MG tablet TAKE 1 TABLET EVERY DAY 21  Yes Braydon Mendoza DO   aspirin 81 MG EC tablet Take 81 mg by mouth daily   Yes Historical Provider, MD   prochlorperazine (COMPAZINE) 10 MG tablet Take 1 tablet by mouth every 6 hours as needed (nausea.) 21  Yes Roula Carpio MD   torsemide (DEMADEX) 5 MG tablet Take 5 mg by mouth daily    Yes Historical Provider, MD   losartan (COZAAR) 50 MG tablet Take 0.5 tablets by mouth daily 3/30/17  Yes Elise Batista MD   atorvastatin (LIPITOR) 40 MG tablet Take 40 mg by mouth daily  14  Yes Historical Provider, MD   Loratadine (CLARITIN PO) Take 10 mg by mouth daily. Yes Historical Provider, MD   atenolol (TENORMIN) 25 MG tablet Take 25 mg by mouth daily. Yes Historical Provider, MD   budesonide-formoterol (SYMBICORT) 160-4.5 MCG/ACT AERO Inhale 2 puffs into the lungs 2 times daily Rinse mouth after use.  20   Roula Carpio MD   albuterol sulfate HFA (VENTOLIN HFA) 108 (90 Base) MCG/ACT inhaler Inhale 2 puffs into the lungs every 6 hours as needed for Wheezing 19   Roula Carpio MD       Current medications:    Current Facility-Administered Medications   Medication Dose Route Frequency Provider Last Rate Last Admin    0.9 % sodium chloride infusion   IntraVENous Continuous Arnoldo Sidhu MD 75 mL/hr at 22 1106 New Bag at 22 1106    sodium chloride flush 0.9 % injection 5-40 mL  5-40 mL IntraVENous 2 times per day Serene Carrillo You Heath MD        sodium chloride flush 0.9 % injection 5-40 mL  5-40 mL IntraVENous PRN Lola Leiva MD        0.9 % sodium chloride infusion  25 mL IntraVENous PRN Lola Leiva MD           Allergies: Allergies   Allergen Reactions    Doxycycline Other (See Comments)     pancreatitis    Acetaminophen Swelling    Aleve [Naproxen] Hives    Amoxicillin-Pot Clavulanate Nausea And Vomiting    Tramadol Swelling       Problem List:    Patient Active Problem List   Diagnosis Code    Gout, arthritis M10.9    IBS (irritable bowel syndrome) K58.9    CAD (coronary artery disease) I25.10    Bunion, right foot M21.611    Hammertoe M20.40    HTN (hypertension) I10    Hyperlipidemia E78.5    Sepsis ([address] A41.9    Collagenous colitis K52.831    History of pneumonia, recurrent Z87.01    Steroid withdrawal syndrome without complication ([facility] W67.351    Fever R50.9    Acute pancreatitis K85.90    Pulmonary emphysema ([facility] J43.9       Past Medical History:        Diagnosis Date    Allergic rhinitis     Arthritis     gout    Asthma     CAD (coronary artery disease) 6/7/2010    CAD (coronary artery disease)     Colitis, collagenous     COVID-19 12/27/2021    mild symptons    Hepatitis A     in her 28's    History of peptic ulcer     History of pneumonia     Hyperlipidemia     Hypertension     IBS (irritable bowel syndrome) 6/7/2010    Obstructive chronic bronchitis with acute bronchitis ([facility] 4/1/2019    Psoriasis        Past Surgical History:        Procedure Laterality Date    BRONCHOSCOPY N/A 4/23/2019    BRONCHOSCOPY WITH BRONCHOALVEOLAR LAVAGE performed by Ofelia Gallagher MD at [address]  4/23/2019    BRONCHOSCOPY DIAGNOSTIC OR CELL [address] ONLY performed by Ofelia Gallagher MD at [address] [facility] [address] Right 12/12/2013    COLONOSCOPY  2009    neg.     COLONOSCOPY N/A 3/20/2019    COLONOSCOPY WITH BIOPSY performed by Merary [facility]lain MD at [facility] ENDOSCOPY    CORONARY ANGIOPLASTY WITH STENT PLACEMENT          EYE SURGERY Bilateral     cataract removal    HAMMER TOE SURGERY Right 2013       Social History:    Social History     Tobacco Use    Smoking status: Former Smoker     Packs/day: 1.00     Years: 30.00     Pack years: 30.00     Types: Cigarettes     Quit date: 3/2/1999     Years since quittin.9    Smokeless tobacco: Never Used   Substance Use Topics    Alcohol use: No                                Counseling given: Not Answered      Vital Signs (Current):   Vitals:    22 1430 22 1107   BP:  135/75   Pulse:  72   Resp:  18   Temp:  97.2 °F (36.2 °C)   TempSrc:  Temporal   SpO2:  96%   Weight: 151 lb (68.5 kg)    Height: 5' 3\" (1.6 m)                                               BP Readings from Last 3 Encounters:   22 135/75   21 136/70   21 112/66       NPO Status: Time of last liquid consumption:                         Time of last solid consumption:                         Date of last liquid consumption: 22                        Date of last solid food consumption: 22    BMI:   Wt Readings from Last 3 Encounters:   22 151 lb (68.5 kg)   21 151 lb 12.8 oz (68.9 kg)   21 159 lb 3.2 oz (72.2 kg)     Body mass index is 26.75 kg/m².     CBC:   Lab Results   Component Value Date    WBC 9.1 2021    RBC 4.07 2021    HGB 12.4 2021    HCT 36.6 2021    MCV 89.9 2021    RDW 15.3 2021     2021       CMP:   Lab Results   Component Value Date     2019    K 3.7 2019    K 3.8 2019     2019    CO2 22 2019    BUN 38 2019    CREATININE 1.1 2019    GFRAA 59 2019    GFRAA 53 2012    AGRATIO 0.9 2019    LABGLOM 48 2019    GLUCOSE 85 2019    PROT 5.4 2019    PROT 7.2 2013    CALCIUM 9.0 2019    BILITOT 0.3 2019    ALKPHOS 71 07/04/2019    AST 13 07/04/2019    ALT 6 07/04/2019       POC Tests: No results for input(s): POCGLU, POCNA, POCK, POCCL, POCBUN, POCHEMO, POCHCT in the last 72 hours. Coags: No results found for: PROTIME, INR, APTT    HCG (If Applicable): No results found for: PREGTESTUR, PREGSERUM, HCG, HCGQUANT     ABGs: No results found for: PHART, PO2ART, NBR4NVS, INK4BFH, BEART, N8ROQRIN     Type & Screen (If Applicable):  No results found for: LABABO, LABRH    Drug/Infectious Status (If Applicable):  No results found for: HIV, HEPCAB    COVID-19 Screening (If Applicable):   Lab Results   Component Value Date    COVID19 NOT DETECTED 09/09/2020           Anesthesia Evaluation  Patient summary reviewed no history of anesthetic complications:   Airway: Mallampati: III  TM distance: >3 FB   Neck ROM: full  Mouth opening: > = 3 FB Dental: normal exam         Pulmonary:   (+) COPD:      (-) not a current smoker                           Cardiovascular:    (+) hypertension:, CAD:, CABG/stent (Stent 1999):,     (-) past MI and  angina       Beta Blocker:  Dose within 24 Hrs         Neuro/Psych:   (+) neuromuscular disease (Gout):,             GI/Hepatic/Renal:        (-) no renal disease       Endo/Other:        (-) hypothyroidism, hyperthyroidism               Abdominal:             Vascular: negative vascular ROS. Other Findings:             Anesthesia Plan      MAC     ASA 3     (This pre-anesthesia assessment may be used as a history and physical.    DOS STAFF ADDENDUM:    Pt seen and examined, chart reviewed (including anesthesia, drug and allergy history). No interval changes to history and physical examination. Anesthetic plan, risks, benefits, alternatives, and personnel involved discussed with patient. Patient verbalized an understanding and agrees to proceed. Juve Melendez MD  January 26, 2022  11:30 AM    )  Induction: intravenous. Anesthetic plan and risks discussed with patient.       Plan discussed with CRNA.                   Lucy Main MD   1/26/2022

## 2022-01-26 NOTE — PROGRESS NOTES
Pt discharged home with spouse to transport. Discharge instructions given and explained to Pt and spouse with time for questions. All lines removed and belongings sent with Pt at d/c.

## 2022-02-02 NOTE — OP NOTE
[address] [place] [facility]                                OPERATIVE REPORT    PATIENT NAME: Manuela Kenny                        :        1945  MED REC NO:   [id number]                          ROOM:  ACCOUNT NO:   [de-identified]                           ADMIT DATE: 2022  PROVIDER:     Amy Akbar MD      DATE OF PROCEDURE:  2022    PREOPERATIVE DIAGNOSIS:  Multiple skin lesions of the back that  interfere with wearing of her brassiere. POSTOPERATIVE DIAGNOSES:  A 1.6-cm right back lesion, 3.0-cm left back  lesion and one keratotic lesion of the back in line of the brassiere  strap. OPERATION PERFORMED:  Excision of 1.6-cm back lesion and closure of  1.6-cm incision with a 1.6-cm layered intermediate repair, excision of  3.0-cm left back lesion and closure of 3.0-cm incision with a 3.0-cm  layered intermediate repair, and curettage destruction of a single  keratotic lesion of the back. SURGEON:  Amy Akbar., MD, FACS    ASSISTANT SURGEON:  None. SURGICAL FIRST ASSISTANT:  Ms. Italia Yates. ANESTHESIA:  Monitored anesthesia care with local infiltration per the  surgeon with \"local anesthetic\" with epinephrine per the [facility]. FINDINGS:  As above. COMPLICATIONS:  None. ESTIMATED BLOOD LOSS:  Less than 10 mL. DRAINS:  None. CONDITION:  Discharged to the postanesthesia care unit, then ambulatory  surgery center and then home in stable condition having tolerated the  surgery and anesthesia well. Preprinted wound care and discharge  instructions were included in the after-visit summary given to the  patient by the institution.     OPERATIVE PROCEDURE:  After having obtained informed operative consent  on an outpatient basis, which consisted of description of the problem  being multiple skin lesions of the back that interfere with wearing of  her brassiere; description of the planned surgery and anesthesia and any  other procedures indicated at the time of the surgery being local  infiltration, sedation and excision of multiple skin lesions of the back  with anesthesia being local infiltration and sedation; description of  the risks and benefits of the planned surgery and anesthesia including  but not limited to anaphylactic shock, administration of blood and/or  blood products with risk of blood-borne diseases, hypotension, injury to  adjacent vital structures including numbness of the skin in the area of  the incisions, deep venous thrombosis of the legs, increased risk due to  hypertension, hyperlipidemia, gout, and aspirin therapy; pulmonary  emboli with increased risk due to the same list of comorbidities;  partial graft versus complete graft flap loss with increased risk due to  the same list of comorbidities; heart attack with increased risk due to  the same list of comorbidities; coma with increased risk due to the same  list of comorbidities; stroke with increased risk due to the same list  of comorbidities; paralysis with increased risk due to the same list of  comorbidities; weakness with increased risk due to the same list of  comorbidities; death with increased risk due to the same list of  comorbidities; infection with increased risk due to the same list of  comorbidities; bleeding with increased risk due to the same list of  comorbidities; hematoma formation with increased risk due to the same  list of comorbidities; wound dehiscence with increased risk due to the  same list of comorbidities; subtle asymmetry; hepatitis; renal failure;  lung failure; scar hypertrophy; keloid formation; fibrosis; pain for  which medication is not available in this patient's case because she is  allergic to acetaminophen and to nonsteroidal anti-inflammatory  medications; risks associated with the use of a tourniquet.   All the  above risks are increased by poor health circumstances such as obesity,  malnutrition, smoking, and other forms of tobacco use; description of  alternatives to the planned surgery and anesthesia including but not  limited to local infiltration alone, sedation alone, regional block with  tumescent anesthesia and general anesthesia. Surgical alternatives  including curettage destruction and laser photoablation; description of  the consequences of no treatment to the problem including but not  limited to lesions will continue to grow and interfere with wearing of  the brassiere; and statement as to the probability of successful outcome  of planned surgery and anesthesia barring occurrence of major  complications being greater than 85%. The patient was then afforded the  opportunity to ask questions. All questions were answered and the  patient understood the answers to the questions and informed operative  consent. On the day of surgery, the patient was taken to the operating room and  placed on the operating table in supine position. The patient was then  turned into prone position and intravenous sedation anesthesia was  initiated for monitored anesthesia care. The patient's surgical sites  which had been marked preoperatively by the surgeon with lines for  elliptical excision were then cleansed with an alcoholic swab and local  infiltration per the surgeon with the above-named local anesthetic  admixture was carried out. Additional local anesthetic was administered  at the end of the procedure. The surgical sites were then cleansed with surgical skin cleansing  solution after three minutes  by timer, the areas were draped in  a routine sterile fashion. Under 4.5 power loupe magnification, a 1.6-cm right back lesion was  excised in an elliptical fashion with #15 scalpel blade and forceps  dissection. Specimen was submitted to Pathology for microscopic  examination.     The Malis bipolar cautery instrument was used for hemostasis and then  the 1.6-cm incision was closed with 1.6-cm layered-intermediate repair  with interrupted inverted simple sutures with 5-0 Monocryl in the  adipose and the deep dermis. The superficial dermis and epidermis were  closed with a running vertical mattress suture of 4-0 Prolene. The skin  was washed, dried and dressed with antibiotic ointment and a sterile  dressing. A 3.0-cm left back lesion was excised in elliptical fashion and  submitted to Pathology for microscopic examination. The 3.0-cm incision  was then closed with a 3.0-cm layered intermediate repair with  interrupted inverted simple sutures of 5-0 Monocryl in the adipose and  the deep dermis. The superficial dermis and epidermis were closed with  a running vertical mattress suture of 4-0 Prolene. The skin was washed,  dried and dressed with antibiotic ointment and a dry sterile dressing. The keratotic lesion in line with brassiere that interfered with wearing  the brassiere was then destroyed with curettage and electrodessication. Area was then dressed with antibiotic ointment and a dry dressing. The patient was then awakened from sedation anesthesia and discharged to  the postanesthesia care unit, then ambulatory surgery center, and then  home in stable condition having tolerated the surgery and anesthesia  well. The patient is to follow light activities, eat regular diet, and  be seen in followup in my office in one week. She can take whatever she  can take for pain as she is allergic to acetaminophen and to  nonsteroidal anti-inflammatory medications. The preoperative plan for outpatient surgery was carried out. The  patient's social and emotional needs were assessed and met. The  patient's mental status examination at the time of discharge is  awakening from sedation anesthesia. The patient's prognosis is good. The temperature entering and exiting the room was 70 degrees. The patient's VTE risk is high.   The patient did not require chemical  prophylaxis in the operating room today. The patient did receive  mechanical prophylaxis with bilateral sequential compression devices on  the legs, a pillow beneath her feet, and the operating room table was  head down 5 degrees in position.         Alisia Clancy MD    D: 02/01/2022 20:23:40       T: 02/01/2022 22:57:39     [id number]  Job#: [id number]     Doc#: [id number]    CC:  Carolyn Arenas DO

## 2022-02-23 RX ORDER — ALLOPURINOL 300 MG/1
TABLET ORAL
Qty: 90 TABLET | Refills: 2 | Status: SHIPPED | OUTPATIENT
Start: 2022-02-23

## 2022-03-04 RX ORDER — BUDESONIDE AND FORMOTEROL FUMARATE DIHYDRATE 160; 4.5 UG/1; UG/1
2 AEROSOL RESPIRATORY (INHALATION) 2 TIMES DAILY
Qty: 1 EACH | Refills: 5 | Status: SHIPPED | OUTPATIENT
Start: 2022-03-04

## 2022-06-06 ENCOUNTER — OFFICE VISIT (OUTPATIENT)
Dept: FAMILY MEDICINE CLINIC | Age: 77
End: 2022-06-06
Payer: MEDICARE

## 2022-06-06 VITALS
BODY MASS INDEX: 27.29 KG/M2 | WEIGHT: 154 LBS | TEMPERATURE: 97.8 F | DIASTOLIC BLOOD PRESSURE: 64 MMHG | SYSTOLIC BLOOD PRESSURE: 132 MMHG | HEIGHT: 63 IN

## 2022-06-06 DIAGNOSIS — J01.90 ACUTE BACTERIAL SINUSITIS: Primary | ICD-10-CM

## 2022-06-06 DIAGNOSIS — B96.89 ACUTE BACTERIAL SINUSITIS: Primary | ICD-10-CM

## 2022-06-06 PROCEDURE — 1090F PRES/ABSN URINE INCON ASSESS: CPT | Performed by: INTERNAL MEDICINE

## 2022-06-06 PROCEDURE — 1036F TOBACCO NON-USER: CPT | Performed by: INTERNAL MEDICINE

## 2022-06-06 PROCEDURE — 1123F ACP DISCUSS/DSCN MKR DOCD: CPT | Performed by: INTERNAL MEDICINE

## 2022-06-06 PROCEDURE — G8417 CALC BMI ABV UP PARAM F/U: HCPCS | Performed by: INTERNAL MEDICINE

## 2022-06-06 PROCEDURE — G8427 DOCREV CUR MEDS BY ELIG CLIN: HCPCS | Performed by: INTERNAL MEDICINE

## 2022-06-06 PROCEDURE — G8399 PT W/DXA RESULTS DOCUMENT: HCPCS | Performed by: INTERNAL MEDICINE

## 2022-06-06 PROCEDURE — 99213 OFFICE O/P EST LOW 20 MIN: CPT | Performed by: INTERNAL MEDICINE

## 2022-06-06 RX ORDER — AZITHROMYCIN 250 MG/1
250 TABLET, FILM COATED ORAL SEE ADMIN INSTRUCTIONS
Qty: 6 TABLET | Refills: 0 | Status: SHIPPED | OUTPATIENT
Start: 2022-06-06 | End: 2022-06-11

## 2022-06-06 ASSESSMENT — PATIENT HEALTH QUESTIONNAIRE - PHQ9
2. FEELING DOWN, DEPRESSED OR HOPELESS: 0
SUM OF ALL RESPONSES TO PHQ QUESTIONS 1-9: 0
SUM OF ALL RESPONSES TO PHQ QUESTIONS 1-9: 0
SUM OF ALL RESPONSES TO PHQ9 QUESTIONS 1 & 2: 0
1. LITTLE INTEREST OR PLEASURE IN DOING THINGS: 0
SUM OF ALL RESPONSES TO PHQ QUESTIONS 1-9: 0
SUM OF ALL RESPONSES TO PHQ QUESTIONS 1-9: 0

## 2022-06-06 ASSESSMENT — ENCOUNTER SYMPTOMS
COUGH: 1
SHORTNESS OF BREATH: 0
RHINORRHEA: 1
ABDOMINAL PAIN: 0
SINUS PRESSURE: 1
CONSTIPATION: 0
BLOOD IN STOOL: 0
SINUS PAIN: 1

## 2022-06-06 NOTE — PROGRESS NOTES
Italia Otto (:  1945) is a 67 y.o. female,Established patient, here for evaluation of the following chief complaint(s):  Sinusitis (patient c/o headache, productive cough, nasal drainage since 6/3/2022. patient denies sore throat, fever)         ASSESSMENT/PLAN:   Diagnosis Orders   1. Acute bacterial sinusitis  azithromycin (ZITHROMAX) 250 MG tablet     Outpatient Encounter Medications as of 2022   Medication Sig Dispense Refill    azithromycin (ZITHROMAX) 250 MG tablet Take 1 tablet by mouth See Admin Instructions for 5 days 500mg on day 1 followed by 250mg on days 2 - 5 6 tablet 0    budesonide-formoterol (SYMBICORT) 160-4.5 MCG/ACT AERO Inhale 2 puffs into the lungs 2 times daily Rinse mouth after use. 1 each 5    allopurinol (ZYLOPRIM) 300 MG tablet TAKE 1 TABLET EVERY DAY 90 tablet 2    aspirin 81 MG EC tablet Take 81 mg by mouth daily      prochlorperazine (COMPAZINE) 10 MG tablet Take 1 tablet by mouth every 6 hours as needed (nausea.) 100 tablet 3    torsemide (DEMADEX) 5 MG tablet Take 5 mg by mouth daily       albuterol sulfate HFA (VENTOLIN HFA) 108 (90 Base) MCG/ACT inhaler Inhale 2 puffs into the lungs every 6 hours as needed for Wheezing 18 Inhaler 1    losartan (COZAAR) 50 MG tablet Take 0.5 tablets by mouth daily 30 tablet 0    atorvastatin (LIPITOR) 40 MG tablet Take 40 mg by mouth daily       Loratadine (CLARITIN PO) Take 10 mg by mouth daily.  atenolol (TENORMIN) 25 MG tablet Take 25 mg by mouth daily. No facility-administered encounter medications on file as of 2022. No orders of the defined types were placed in this encounter. Subjective   SUBJECTIVE/OBJECTIVE:  HPI   Chief Complaint   Patient presents with    Sinusitis     patient c/o headache, productive cough, nasal drainage since 6/3/2022.   patient denies sore throat, fever     Italia Otto is a 67 y.o. female with the following history as recorded in [facility]:  Patient Active Problem List    Diagnosis Date Noted    Pulmonary emphysema ([facility] 08/01/2019    Acute pancreatitis 07/02/2019    Fever 05/30/2019    Steroid withdrawal syndrome without complication ([facility] 33/92/6360    History of pneumonia, recurrent     Collagenous colitis 04/21/2019    Sepsis ([facility] 04/20/2019    Bunion, right foot 12/12/2013    Hammertoe 12/12/2013    HTN (hypertension) 12/12/2013    Hyperlipidemia 12/12/2013    Gout, arthritis 06/07/2010    IBS (irritable bowel syndrome) 06/07/2010    CAD (coronary artery disease) 06/07/2010     Current Outpatient Medications   Medication Sig Dispense Refill    budesonide-formoterol (SYMBICORT) 160-4.5 MCG/ACT AERO Inhale 2 puffs into the lungs 2 times daily Rinse mouth after use. 1 each 5    allopurinol (ZYLOPRIM) 300 MG tablet TAKE 1 TABLET EVERY DAY 90 tablet 2    aspirin 81 MG EC tablet Take 81 mg by mouth daily      prochlorperazine (COMPAZINE) 10 MG tablet Take 1 tablet by mouth every 6 hours as needed (nausea.) 100 tablet 3    torsemide (DEMADEX) 5 MG tablet Take 5 mg by mouth daily       albuterol sulfate HFA (VENTOLIN HFA) 108 (90 Base) MCG/ACT inhaler Inhale 2 puffs into the lungs every 6 hours as needed for Wheezing 18 Inhaler 1    losartan (COZAAR) 50 MG tablet Take 0.5 tablets by mouth daily 30 tablet 0    atorvastatin (LIPITOR) 40 MG tablet Take 40 mg by mouth daily       Loratadine (CLARITIN PO) Take 10 mg by mouth daily.  atenolol (TENORMIN) 25 MG tablet Take 25 mg by mouth daily. No current facility-administered medications for this visit.      Allergies: Doxycycline, Acetaminophen, Aleve [naproxen], Amoxicillin-pot clavulanate, and Tramadol  Past Medical History:   Diagnosis Date    Allergic rhinitis     Arthritis     gout    Asthma     CAD (coronary artery disease) 6/7/2010    CAD (coronary artery disease)     Colitis, collagenous     COVID-19 12/27/2021    mild symptons    Hepatitis A     in her 28's    History of peptic ulcer  History of pneumonia     Hyperlipidemia     Hypertension     IBS (irritable bowel syndrome) 2010    Obstructive chronic bronchitis with acute bronchitis ([facility] 2019    Psoriasis      Past Surgical History:   Procedure Laterality Date    BACK SURGERY N/A 2022    EXCISION MULTIPLE BACK LESIONS, MULTIPLE LAYERED INTERMEDIATE INCISION REPAIRS performed by Teja Bagley MD at [address] 2019    BRONCHOSCOPY WITH BRONCHOALVEOLAR LAVAGE performed by Casa Beck MD at [address]  2019    BRONCHOSCOPY DIAGNOSTIC OR CELL [address] ONLY performed by Casa Beck MD at [address] [facility] [address] Right 2013    COLONOSCOPY  2009    neg.  COLONOSCOPY N/A 3/20/2019    COLONOSCOPY WITH BIOPSY performed by Yosi Huertas MD at [address] Bilateral 2010    cataract removal    HAMMER TOE SURGERY Right 2013     Family History   Problem Relation Age of Onset    Cancer Father         GI CANCER    No Known Problems Mother     Crohn's Disease Sister     Lung Cancer Brother      Social History     Tobacco Use    Smoking status: Former Smoker     Packs/day: 1.00     Years: 30.00     Pack years: 30.00     Types: Cigarettes     Quit date: 3/2/1999     Years since quittin.2    Smokeless tobacco: Never Used   Substance Use Topics    Alcohol use: No       Review of Systems   Constitutional: Negative for chills, diaphoresis and fatigue. HENT: Positive for congestion, postnasal drip, rhinorrhea, sinus pressure and sinus pain. Respiratory: Positive for cough. Negative for shortness of breath. Cardiovascular: Negative for chest pain and palpitations. Gastrointestinal: Negative for abdominal pain, blood in stool and constipation. Genitourinary: Negative for dysuria and frequency. Objective   Physical Exam  Vitals and nursing note reviewed. Constitutional:       General: She is not in acute distress. HENT:      Head: Normocephalic and atraumatic. Comments: Edema bilat. nasal turbinates with drainage . Cardiovascular:      Rate and Rhythm: Normal rate and regular rhythm. Heart sounds: No murmur heard. Pulmonary:      Effort: No respiratory distress. Breath sounds: No wheezing. Abdominal:      General: There is no distension. Tenderness: There is no abdominal tenderness. There is no rebound. Skin:     Coloration: Skin is not jaundiced. Findings: No bruising. Neurological:      Mental Status: She is alert.                   An electronic signature was used to authenticate this note.    --Quinn Mann DO

## 2022-06-13 ENCOUNTER — HOSPITAL ENCOUNTER (OUTPATIENT)
Dept: MAMMOGRAPHY | Age: 77
Discharge: HOME | End: 2022-06-13
Payer: MEDICARE

## 2022-06-13 VITALS — HEIGHT: 63 IN | BODY MASS INDEX: 26.58 KG/M2 | WEIGHT: 150 LBS

## 2022-06-13 DIAGNOSIS — Z12.31 VISIT FOR SCREENING MAMMOGRAM: ICD-10-CM

## 2022-06-13 PROCEDURE — 77067 SCR MAMMO BI INCL CAD: CPT

## 2022-07-29 ENCOUNTER — OFFICE VISIT (OUTPATIENT)
Dept: FAMILY MEDICINE CLINIC | Age: 77
End: 2022-07-29
Payer: MEDICARE

## 2022-07-29 VITALS
TEMPERATURE: 97.4 F | SYSTOLIC BLOOD PRESSURE: 120 MMHG | WEIGHT: 154 LBS | DIASTOLIC BLOOD PRESSURE: 66 MMHG | BODY MASS INDEX: 27.29 KG/M2 | HEIGHT: 63 IN

## 2022-07-29 DIAGNOSIS — R07.89 CHEST WALL PAIN: Primary | ICD-10-CM

## 2022-07-29 PROCEDURE — 1123F ACP DISCUSS/DSCN MKR DOCD: CPT | Performed by: INTERNAL MEDICINE

## 2022-07-29 PROCEDURE — G8417 CALC BMI ABV UP PARAM F/U: HCPCS | Performed by: INTERNAL MEDICINE

## 2022-07-29 PROCEDURE — 1036F TOBACCO NON-USER: CPT | Performed by: INTERNAL MEDICINE

## 2022-07-29 PROCEDURE — G8427 DOCREV CUR MEDS BY ELIG CLIN: HCPCS | Performed by: INTERNAL MEDICINE

## 2022-07-29 PROCEDURE — G8399 PT W/DXA RESULTS DOCUMENT: HCPCS | Performed by: INTERNAL MEDICINE

## 2022-07-29 PROCEDURE — 99213 OFFICE O/P EST LOW 20 MIN: CPT | Performed by: INTERNAL MEDICINE

## 2022-07-29 PROCEDURE — 1090F PRES/ABSN URINE INCON ASSESS: CPT | Performed by: INTERNAL MEDICINE

## 2022-07-29 RX ORDER — METHYLPREDNISOLONE 4 MG/1
TABLET ORAL
Qty: 1 KIT | Refills: 0 | Status: SHIPPED | OUTPATIENT
Start: 2022-07-29 | End: 2022-10-18

## 2022-07-29 ASSESSMENT — PATIENT HEALTH QUESTIONNAIRE - PHQ9
SUM OF ALL RESPONSES TO PHQ9 QUESTIONS 1 & 2: 0
SUM OF ALL RESPONSES TO PHQ QUESTIONS 1-9: 0
1. LITTLE INTEREST OR PLEASURE IN DOING THINGS: 0
SUM OF ALL RESPONSES TO PHQ QUESTIONS 1-9: 0
2. FEELING DOWN, DEPRESSED OR HOPELESS: 0

## 2022-07-29 ASSESSMENT — ENCOUNTER SYMPTOMS
APNEA: 0
SHORTNESS OF BREATH: 0
SINUS PAIN: 0
COUGH: 0
RHINORRHEA: 0

## 2022-07-29 NOTE — PROGRESS NOTES
Briseida Lion (:  1945) is a 67 y.o. female,Established patient, here for evaluation of the following chief complaint(s):  Pleurisy (Patient c/o ? Pleurisy; sharp left side pain since early this morning. Patient has taken OTC Ibuprofen)         ASSESSMENT/PLAN:   Diagnosis Orders   1. Chest wall pain         Cont. Ibuprofen . Medrol dose pack if pain continues         Subjective   SUBJECTIVE/OBJECTIVE:  HPI. cc   Chief Complaint   Patient presents with    Pleurisy     Patient c/o ? Pleurisy; sharp left side pain since early this morning. Patient has taken OTC Ibuprofen    Briseida Lion is a 67 y.o. female with the following history as recorded in [facility]:  Patient Active Problem List    Diagnosis Date Noted    Pulmonary emphysema ([facility] 2019    Acute pancreatitis 2019    Fever 2019    Steroid withdrawal syndrome without complication ([facility]     History of pneumonia, recurrent     Collagenous colitis 2019    Sepsis ([facility] 2019    Bunion, right foot 2013    Hammertoe 2013    HTN (hypertension) 2013    Hyperlipidemia 2013    Gout, arthritis 2010    IBS (irritable bowel syndrome) 2010    CAD (coronary artery disease) 2010     Current Outpatient Medications   Medication Sig Dispense Refill    budesonide-formoterol (SYMBICORT) 160-4.5 MCG/ACT AERO Inhale 2 puffs into the lungs 2 times daily Rinse mouth after use.  1 each 5    allopurinol (ZYLOPRIM) 300 MG tablet TAKE 1 TABLET EVERY DAY 90 tablet 2    aspirin 81 MG EC tablet Take 81 mg by mouth daily      prochlorperazine (COMPAZINE) 10 MG tablet Take 1 tablet by mouth every 6 hours as needed (nausea.) 100 tablet 3    torsemide (DEMADEX) 5 MG tablet Take 5 mg by mouth daily       albuterol sulfate HFA (VENTOLIN HFA) 108 (90 Base) MCG/ACT inhaler Inhale 2 puffs into the lungs every 6 hours as needed for Wheezing 18 Inhaler 1    losartan (COZAAR) 50 MG tablet Take 0.5 tablets by mouth daily 30 tablet 0    atorvastatin (LIPITOR) 40 MG tablet Take 40 mg by mouth daily       Loratadine (CLARITIN PO) Take 10 mg by mouth daily. atenolol (TENORMIN) 25 MG tablet Take 25 mg by mouth daily. No current facility-administered medications for this visit. Allergies: Doxycycline, Acetaminophen, Aleve [naproxen], Amoxicillin-pot clavulanate, and Tramadol  Past Medical History:   Diagnosis Date    Allergic rhinitis     Arthritis     gout    Asthma     CAD (coronary artery disease) 6/7/2010    CAD (coronary artery disease)     Colitis, collagenous     COVID-19 12/27/2021    mild symptons    Hepatitis A     in her 30's    History of peptic ulcer     History of pneumonia     Hyperlipidemia     Hypertension     IBS (irritable bowel syndrome) 6/7/2010    Obstructive chronic bronchitis with acute bronchitis ([facility] 4/1/2019    Psoriasis      Past Surgical History:   Procedure Laterality Date    BACK SURGERY N/A 1/26/2022    EXCISION MULTIPLE BACK LESIONS, MULTIPLE LAYERED INTERMEDIATE INCISION REPAIRS performed by Edel Seaman MD at [facility] N/A 4/23/2019    BRONCHOSCOPY WITH BRONCHOALVEOLAR LAVAGE performed by Melanie Ramirez MD at [address]  4/23/2019    BRONCHOSCOPY DIAGNOSTIC OR CELL [address] ONLY performed by Melanie Ramirez MD at [facility] Right 12/12/2013    COLONOSCOPY  2009    neg.     COLONOSCOPY N/A 3/20/2019    COLONOSCOPY WITH BIOPSY performed by Bhumika Wong MD at [place] [address] Bilateral 2010    cataract removal    HAMMER TOE SURGERY Right 12/12/2013     Family History   Problem Relation Age of Onset    Cancer Father         GI CANCER    No Known Problems Mother     Crohn's Disease Sister     Lung Cancer Brother      Social History     Tobacco Use    Smoking status: Former     Packs/day: 1.00     Years: 30.00     Pack years: 30.00     Types: Cigarettes     Quit date: 3/2/1999     Years since quittin.4    Smokeless tobacco: Never   Substance Use Topics    Alcohol use: No        Review of Systems   Constitutional:  Negative for chills, diaphoresis and fatigue. HENT:  Negative for congestion, postnasal drip, rhinorrhea and sinus pain. Eyes:  Negative for visual disturbance. Respiratory:  Negative for apnea, cough and shortness of breath. Cardiovascular:  Negative for palpitations. Musculoskeletal:         Patient presents with:  Pleurisy: Patient c/o ? Pleurisy; sharp left side pain since early this morning. Patient has taken OTC Ibuprofen           Objective   Physical Exam  Vitals and nursing note reviewed. Constitutional:       General: She is not in acute distress. HENT:      Head: Normocephalic and atraumatic. Cardiovascular:      Rate and Rhythm: Normal rate and regular rhythm. Heart sounds: No murmur heard. Pulmonary:      Effort: No respiratory distress. Breath sounds: No stridor. No wheezing, rhonchi or rales. Abdominal:      General: There is no distension. Tenderness: There is no abdominal tenderness. There is no rebound. Musculoskeletal:         General: No swelling or deformity. Skin:     Coloration: Skin is not jaundiced. Findings: No erythema. Neurological:      General: No focal deficit present. Mental Status: She is alert and oriented to person, place, and time. Cranial Nerves: No cranial nerve deficit. Motor: No weakness.                 An electronic signature was used to authenticate this note.    --Valencia Sandifer, DO

## 2022-10-18 ENCOUNTER — OFFICE VISIT (OUTPATIENT)
Dept: FAMILY MEDICINE CLINIC | Age: 77
End: 2022-10-18
Payer: MEDICARE

## 2022-10-18 VITALS
WEIGHT: 157 LBS | DIASTOLIC BLOOD PRESSURE: 64 MMHG | BODY MASS INDEX: 27.82 KG/M2 | HEIGHT: 63 IN | SYSTOLIC BLOOD PRESSURE: 134 MMHG | TEMPERATURE: 97 F

## 2022-10-18 DIAGNOSIS — H61.21 IMPACTED CERUMEN OF RIGHT EAR: Primary | ICD-10-CM

## 2022-10-18 PROCEDURE — G8417 CALC BMI ABV UP PARAM F/U: HCPCS | Performed by: INTERNAL MEDICINE

## 2022-10-18 PROCEDURE — G8427 DOCREV CUR MEDS BY ELIG CLIN: HCPCS | Performed by: INTERNAL MEDICINE

## 2022-10-18 PROCEDURE — 1123F ACP DISCUSS/DSCN MKR DOCD: CPT | Performed by: INTERNAL MEDICINE

## 2022-10-18 PROCEDURE — 99213 OFFICE O/P EST LOW 20 MIN: CPT | Performed by: INTERNAL MEDICINE

## 2022-10-18 PROCEDURE — 1036F TOBACCO NON-USER: CPT | Performed by: INTERNAL MEDICINE

## 2022-10-18 PROCEDURE — 1090F PRES/ABSN URINE INCON ASSESS: CPT | Performed by: INTERNAL MEDICINE

## 2022-10-18 PROCEDURE — G8484 FLU IMMUNIZE NO ADMIN: HCPCS | Performed by: INTERNAL MEDICINE

## 2022-10-18 PROCEDURE — G8399 PT W/DXA RESULTS DOCUMENT: HCPCS | Performed by: INTERNAL MEDICINE

## 2022-10-18 SDOH — ECONOMIC STABILITY: FOOD INSECURITY: WITHIN THE PAST 12 MONTHS, YOU WORRIED THAT YOUR FOOD WOULD RUN OUT BEFORE YOU GOT MONEY TO BUY MORE.: NEVER TRUE

## 2022-10-18 SDOH — ECONOMIC STABILITY: FOOD INSECURITY: WITHIN THE PAST 12 MONTHS, THE FOOD YOU BOUGHT JUST DIDN'T LAST AND YOU DIDN'T HAVE MONEY TO GET MORE.: NEVER TRUE

## 2022-10-18 ASSESSMENT — PATIENT HEALTH QUESTIONNAIRE - PHQ9
1. LITTLE INTEREST OR PLEASURE IN DOING THINGS: 0
SUM OF ALL RESPONSES TO PHQ QUESTIONS 1-9: 0
SUM OF ALL RESPONSES TO PHQ QUESTIONS 1-9: 0
SUM OF ALL RESPONSES TO PHQ9 QUESTIONS 1 & 2: 0
SUM OF ALL RESPONSES TO PHQ QUESTIONS 1-9: 0
SUM OF ALL RESPONSES TO PHQ QUESTIONS 1-9: 0
2. FEELING DOWN, DEPRESSED OR HOPELESS: 0

## 2022-10-18 ASSESSMENT — SOCIAL DETERMINANTS OF HEALTH (SDOH): HOW HARD IS IT FOR YOU TO PAY FOR THE VERY BASICS LIKE FOOD, HOUSING, MEDICAL CARE, AND HEATING?: NOT HARD AT ALL

## 2022-10-18 ASSESSMENT — ENCOUNTER SYMPTOMS
APNEA: 0
SHORTNESS OF BREATH: 0
COUGH: 0
ABDOMINAL PAIN: 0

## 2022-10-18 NOTE — PATIENT INSTRUCTIONS
Thank you for choosing [facility]. Please bring a current list of medications to every appointment. Please contact your pharmacy for any prescription refill(s) that you are requesting.

## 2022-10-18 NOTE — PROGRESS NOTES
Marybeth Woo (:  1945) is a 67 y.o. female,Established patient, here for evaluation of the following chief complaint(s):  Cerumen Impaction (Patient c/o ? Cerumen impaction of right ear x 2 weeks. Patient has used OTC Debrox)         ASSESSMENT/PLAN:   Diagnosis Orders   1. Impacted cerumen of right ear         H2O irrigation with good results after multiple attempts . 22 minutes spent on this visit     Subjective   SUBJECTIVE/OBJECTIVE:  HPI   Chief Complaint   Patient presents with    Cerumen Impaction     Patient c/o ? Cerumen impaction of right ear x 2 weeks. Patient has used OTC Debrox    Marybeth Woo is a 67 y.o. female with the following history as recorded in [facility]:  Patient Active Problem List    Diagnosis Date Noted    Pulmonary emphysema ([facility] [address] 2019    Acute pancreatitis 2019    Fever 2019    Steroid withdrawal syndrome without complication ([facility] [address]     History of pneumonia, recurrent     Collagenous colitis 2019    Sepsis ([facility] [address] 2019    Bunion, right foot 2013    Hammertoe 2013    HTN (hypertension) 2013    Hyperlipidemia 2013    Gout, arthritis 2010    IBS (irritable bowel syndrome) 2010    CAD (coronary artery disease) 2010     Current Outpatient Medications   Medication Sig Dispense Refill    budesonide-formoterol (SYMBICORT) 160-4.5 MCG/ACT AERO Inhale 2 puffs into the lungs 2 times daily Rinse mouth after use.  1 each 5    allopurinol (ZYLOPRIM) 300 MG tablet TAKE 1 TABLET EVERY DAY 90 tablet 2    aspirin 81 MG EC tablet Take 81 mg by mouth daily      prochlorperazine (COMPAZINE) 10 MG tablet Take 1 tablet by mouth every 6 hours as needed (nausea.) 100 tablet 3    torsemide (DEMADEX) 5 MG tablet Take 5 mg by mouth daily       albuterol sulfate HFA (VENTOLIN HFA) 108 (90 Base) MCG/ACT inhaler Inhale 2 puffs into the lungs every 6 hours as needed for Wheezing 18 Inhaler 1    losartan (COZAAR) 50 MG tablet Take 0.5 tablets by mouth daily 30 tablet 0    atorvastatin (LIPITOR) 40 MG tablet Take 40 mg by mouth daily       Loratadine (CLARITIN PO) Take 10 mg by mouth daily. atenolol (TENORMIN) 25 MG tablet Take 25 mg by mouth daily. No current facility-administered medications for this visit. Allergies: Doxycycline, Acetaminophen, Aleve [naproxen], Amoxicillin-pot clavulanate, and Tramadol  Past Medical History:   Diagnosis Date    Allergic rhinitis     Arthritis     gout    Asthma     CAD (coronary artery disease) 6/7/2010    CAD (coronary artery disease)     Colitis, collagenous     COVID-19 12/27/2021    mild symptons    Hepatitis A     in her 30's    History of peptic ulcer     History of pneumonia     Hyperlipidemia     Hypertension     IBS (irritable bowel syndrome) 6/7/2010    Obstructive chronic bronchitis with acute bronchitis ([facility] [address] 4/1/2019    Psoriasis      Past Surgical History:   Procedure Laterality Date    BACK SURGERY N/A 1/26/2022    EXCISION MULTIPLE BACK LESIONS, MULTIPLE LAYERED INTERMEDIATE INCISION REPAIRS performed by Denise Quevedo MD at [facility] N/A 4/23/2019    BRONCHOSCOPY WITH BRONCHOALVEOLAR LAVAGE performed by Tres Cross MD at [address]   4/23/2019    BRONCHOSCOPY DIAGNOSTIC OR CELL [address] ONLY performed by Tres Cross MD at [facility] Right 12/12/2013    COLONOSCOPY  2009    neg.     COLONOSCOPY N/A 3/20/2019    COLONOSCOPY WITH BIOPSY performed by Annalee Fierro MD at [place] [address] Bilateral 2010    cataract removal    HAMMER TOE SURGERY Right 12/12/2013     Family History   Problem Relation Age of Onset    Cancer Father         GI CANCER    No Known Problems Mother     Crohn's Disease Sister     Lung Cancer Brother      Social History     Tobacco Use    Smoking status: Former     Packs/day: 1.00     Years: 30.00     Pack years: 30.00 Types: Cigarettes     Quit date: 3/2/1999     Years since quittin.6    Smokeless tobacco: Never   Substance Use Topics    Alcohol use: No        Review of Systems   Constitutional:  Negative for chills, diaphoresis, fatigue and fever. HENT:          Patient presents with:  Cerumen Impaction: Patient c/o ? Cerumen impaction of right ear x 2 weeks. Patient has used OTC Debrox      Respiratory:  Negative for apnea, cough and shortness of breath. Cardiovascular:  Negative for chest pain and palpitations. Gastrointestinal:  Negative for abdominal pain. Neurological:  Negative for dizziness and headaches. Objective   Physical Exam  Vitals and nursing note reviewed. Constitutional:       Appearance: Normal appearance. HENT:      Head: Normocephalic and atraumatic. Ears:      Comments: Cerumen impaction R ear  Cardiovascular:      Rate and Rhythm: Normal rate and regular rhythm. Heart sounds: No murmur heard. Pulmonary:      Effort: No respiratory distress. Breath sounds: No wheezing or rales. Abdominal:      General: There is no distension. Tenderness: There is no abdominal tenderness. There is no guarding. Neurological:      Mental Status: She is alert.                 An electronic signature was used to authenticate this note.    --Nettie Sampson DO

## 2023-01-04 ENCOUNTER — OFFICE VISIT (OUTPATIENT)
Dept: FAMILY MEDICINE CLINIC | Age: 78
End: 2023-01-04
Payer: MEDICARE

## 2023-01-04 VITALS
DIASTOLIC BLOOD PRESSURE: 62 MMHG | TEMPERATURE: 97.3 F | BODY MASS INDEX: 27.82 KG/M2 | WEIGHT: 157 LBS | SYSTOLIC BLOOD PRESSURE: 122 MMHG | HEIGHT: 63 IN

## 2023-01-04 DIAGNOSIS — J01.90 ACUTE BACTERIAL SINUSITIS: Primary | ICD-10-CM

## 2023-01-04 DIAGNOSIS — B96.89 ACUTE BACTERIAL SINUSITIS: Primary | ICD-10-CM

## 2023-01-04 PROCEDURE — 3074F SYST BP LT 130 MM HG: CPT | Performed by: INTERNAL MEDICINE

## 2023-01-04 PROCEDURE — G8417 CALC BMI ABV UP PARAM F/U: HCPCS | Performed by: INTERNAL MEDICINE

## 2023-01-04 PROCEDURE — G8399 PT W/DXA RESULTS DOCUMENT: HCPCS | Performed by: INTERNAL MEDICINE

## 2023-01-04 PROCEDURE — G8427 DOCREV CUR MEDS BY ELIG CLIN: HCPCS | Performed by: INTERNAL MEDICINE

## 2023-01-04 PROCEDURE — 3078F DIAST BP <80 MM HG: CPT | Performed by: INTERNAL MEDICINE

## 2023-01-04 PROCEDURE — G8484 FLU IMMUNIZE NO ADMIN: HCPCS | Performed by: INTERNAL MEDICINE

## 2023-01-04 PROCEDURE — 99213 OFFICE O/P EST LOW 20 MIN: CPT | Performed by: INTERNAL MEDICINE

## 2023-01-04 PROCEDURE — 1090F PRES/ABSN URINE INCON ASSESS: CPT | Performed by: INTERNAL MEDICINE

## 2023-01-04 PROCEDURE — 1123F ACP DISCUSS/DSCN MKR DOCD: CPT | Performed by: INTERNAL MEDICINE

## 2023-01-04 PROCEDURE — 1036F TOBACCO NON-USER: CPT | Performed by: INTERNAL MEDICINE

## 2023-01-04 RX ORDER — AZITHROMYCIN 250 MG/1
250 TABLET, FILM COATED ORAL SEE ADMIN INSTRUCTIONS
Qty: 6 TABLET | Refills: 0 | Status: SHIPPED | OUTPATIENT
Start: 2023-01-04 | End: 2023-01-09

## 2023-01-04 ASSESSMENT — ENCOUNTER SYMPTOMS
ABDOMINAL PAIN: 0
SHORTNESS OF BREATH: 0
RHINORRHEA: 1
COUGH: 0
SINUS PAIN: 1
APNEA: 0
SINUS PRESSURE: 1

## 2023-01-04 ASSESSMENT — PATIENT HEALTH QUESTIONNAIRE - PHQ9
SUM OF ALL RESPONSES TO PHQ QUESTIONS 1-9: 0
1. LITTLE INTEREST OR PLEASURE IN DOING THINGS: 0
2. FEELING DOWN, DEPRESSED OR HOPELESS: 0
SUM OF ALL RESPONSES TO PHQ QUESTIONS 1-9: 0
SUM OF ALL RESPONSES TO PHQ QUESTIONS 1-9: 0
SUM OF ALL RESPONSES TO PHQ9 QUESTIONS 1 & 2: 0
SUM OF ALL RESPONSES TO PHQ QUESTIONS 1-9: 0

## 2023-01-04 NOTE — PROGRESS NOTES
Flint Schilder (:  1945) is a 65 y.o. female,Established patient, here for evaluation of the following chief complaint(s):  Sinusitis (Patient c/o sinus infection since 2023; headache, dizziness, head congestion, productive cough. Patient denies sore throat. Patient tested negative for Covid-19)  Flint Schilder is a 65 y.o. female with the following history as recorded in [facility]:  Patient Active Problem List    Diagnosis Date Noted    Pulmonary emphysema ([facility] 2019    Acute pancreatitis 2019    Fever 2019    Steroid withdrawal syndrome without complication ([facility]     History of pneumonia, recurrent     Collagenous colitis 2019    Sepsis ([facility] 2019    Bunion, right foot 2013    Hammertoe 2013    HTN (hypertension) 2013    Hyperlipidemia 2013    Gout, arthritis 2010    IBS (irritable bowel syndrome) 2010    CAD (coronary artery disease) 2010     Current Outpatient Medications   Medication Sig Dispense Refill    budesonide-formoterol (SYMBICORT) 160-4.5 MCG/ACT AERO Inhale 2 puffs into the lungs 2 times daily Rinse mouth after use. 1 each 5    allopurinol (ZYLOPRIM) 300 MG tablet TAKE 1 TABLET EVERY DAY 90 tablet 2    aspirin 81 MG EC tablet Take 81 mg by mouth daily      prochlorperazine (COMPAZINE) 10 MG tablet Take 1 tablet by mouth every 6 hours as needed (nausea.) 100 tablet 3    torsemide (DEMADEX) 5 MG tablet Take 5 mg by mouth daily       albuterol sulfate HFA (VENTOLIN HFA) 108 (90 Base) MCG/ACT inhaler Inhale 2 puffs into the lungs every 6 hours as needed for Wheezing 18 Inhaler 1    losartan (COZAAR) 50 MG tablet Take 0.5 tablets by mouth daily 30 tablet 0    atorvastatin (LIPITOR) 40 MG tablet Take 40 mg by mouth daily       Loratadine (CLARITIN PO) Take 10 mg by mouth daily. atenolol (TENORMIN) 25 MG tablet Take 25 mg by mouth daily. No current facility-administered medications for this visit. Allergies: Doxycycline, Acetaminophen, Aleve [naproxen], Amoxicillin-pot clavulanate, and Tramadol  Past Medical History:   Diagnosis Date    Allergic rhinitis     Arthritis     gout    Asthma     CAD (coronary artery disease) 2010    CAD (coronary artery disease)     Colitis, collagenous     COVID-19 2021    mild symptons    Hepatitis A     in her 30's    History of peptic ulcer     History of pneumonia     Hyperlipidemia     Hypertension     IBS (irritable bowel syndrome) 2010    Obstructive chronic bronchitis with acute bronchitis ([facility] [address] 2019    Psoriasis      Past Surgical History:   Procedure Laterality Date    BACK SURGERY N/A 2022    EXCISION MULTIPLE BACK LESIONS, MULTIPLE LAYERED INTERMEDIATE INCISION REPAIRS performed by Agueda Peterson MD at [facility]. [address] N/A 2019    BRONCHOSCOPY WITH BRONCHOALVEOLAR LAVAGE performed by Rory Pool MD at [address] [place] [address]  2019    BRONCHOSCOPY DIAGNOSTIC OR CELL [address] ONLY performed by Rory Pool MD at [facility] Right 2013    COLONOSCOPY  2009    neg. COLONOSCOPY N/A 3/20/2019    COLONOSCOPY WITH BIOPSY performed by Guerrero Saleh MD at [place] [address] Bilateral     cataract removal    HAMMER TOE SURGERY Right 2013     Family History   Problem Relation Age of Onset    Cancer Father         GI CANCER    No Known Problems Mother     Crohn's Disease Sister     Lung Cancer Brother      Social History     Tobacco Use    Smoking status: Former     Packs/day: 1.00     Years: 30.00     Pack years: 30.00     Types: Cigarettes     Quit date: 3/2/1999     Years since quittin.8    Smokeless tobacco: Never   Substance Use Topics    Alcohol use: No           ASSESSMENT/PLAN:   Diagnosis Orders   1.  Acute bacterial sinusitis  azithromycin (ZITHROMAX) 250 MG tablet         Outpatient Encounter Medications as of 1/4/2023   Medication Sig Dispense Refill    azithromycin (ZITHROMAX) 250 MG tablet Take 1 tablet by mouth See Admin Instructions for 5 days 500mg on day 1 followed by 250mg on days 2 - 5 6 tablet 0    budesonide-formoterol (SYMBICORT) 160-4.5 MCG/ACT AERO Inhale 2 puffs into the lungs 2 times daily Rinse mouth after use. 1 each 5    allopurinol (ZYLOPRIM) 300 MG tablet TAKE 1 TABLET EVERY DAY 90 tablet 2    aspirin 81 MG EC tablet Take 81 mg by mouth daily      prochlorperazine (COMPAZINE) 10 MG tablet Take 1 tablet by mouth every 6 hours as needed (nausea.) 100 tablet 3    torsemide (DEMADEX) 5 MG tablet Take 5 mg by mouth daily       albuterol sulfate HFA (VENTOLIN HFA) 108 (90 Base) MCG/ACT inhaler Inhale 2 puffs into the lungs every 6 hours as needed for Wheezing 18 Inhaler 1    losartan (COZAAR) 50 MG tablet Take 0.5 tablets by mouth daily 30 tablet 0    atorvastatin (LIPITOR) 40 MG tablet Take 40 mg by mouth daily       Loratadine (CLARITIN PO) Take 10 mg by mouth daily. atenolol (TENORMIN) 25 MG tablet Take 25 mg by mouth daily. No facility-administered encounter medications on file as of 1/4/2023. No orders of the defined types were placed in this encounter. Subjective   SUBJECTIVE/OBJECTIVE:  HPI    Review of Systems   Constitutional:  Negative for chills, diaphoresis, fatigue and fever. HENT:  Positive for congestion, postnasal drip, rhinorrhea, sinus pressure and sinus pain. Eyes:  Negative for visual disturbance. Respiratory:  Negative for apnea, cough and shortness of breath. Cardiovascular:  Negative for chest pain. Gastrointestinal:  Negative for abdominal pain. Genitourinary:  Negative for dysuria and frequency. Objective   Physical Exam  Constitutional:       Appearance: Normal appearance. HENT:      Head: Normocephalic. Comments: Edema bilat. nasal turbinates with drainage .    Cardiovascular:      Rate and Rhythm: Normal rate and regular rhythm. Heart sounds: No murmur heard. Pulmonary:      Effort: No respiratory distress. Breath sounds: No rhonchi. Abdominal:      General: There is no distension. Tenderness: There is no abdominal tenderness. There is no rebound. Musculoskeletal:         General: No swelling or deformity. Skin:     Coloration: Skin is not jaundiced. Neurological:      Mental Status: She is alert.                 An electronic signature was used to authenticate this note.    --Temo Zuluaga,

## 2023-02-14 RX ORDER — ALLOPURINOL 300 MG/1
TABLET ORAL
Qty: 90 TABLET | Refills: 2 | Status: SHIPPED | OUTPATIENT
Start: 2023-02-14

## 2023-03-22 PROBLEM — A41.9 SEPSIS (HCC): Status: RESOLVED | Noted: 2019-04-20 | Resolved: 2023-03-22

## 2023-03-22 PROBLEM — F19.930 STEROID WITHDRAWAL SYNDROME WITHOUT COMPLICATION (HCC): Status: RESOLVED | Noted: 2019-05-13 | Resolved: 2023-03-22

## 2023-03-27 ENCOUNTER — TELEPHONE (OUTPATIENT)
Dept: FAMILY MEDICINE CLINIC | Age: 78
End: 2023-03-27

## 2023-03-29 ENCOUNTER — OFFICE VISIT (OUTPATIENT)
Dept: FAMILY MEDICINE CLINIC | Age: 78
End: 2023-03-29

## 2023-03-29 VITALS
TEMPERATURE: 97.2 F | HEIGHT: 63 IN | DIASTOLIC BLOOD PRESSURE: 74 MMHG | BODY MASS INDEX: 27.78 KG/M2 | WEIGHT: 156.8 LBS | SYSTOLIC BLOOD PRESSURE: 132 MMHG

## 2023-03-29 VITALS
DIASTOLIC BLOOD PRESSURE: 74 MMHG | WEIGHT: 156.8 LBS | HEIGHT: 63 IN | BODY MASS INDEX: 27.78 KG/M2 | TEMPERATURE: 97.2 F | OXYGEN SATURATION: 96 % | HEART RATE: 56 BPM | SYSTOLIC BLOOD PRESSURE: 132 MMHG

## 2023-03-29 DIAGNOSIS — H61.22 IMPACTED CERUMEN OF LEFT EAR: Primary | ICD-10-CM

## 2023-03-29 DIAGNOSIS — Z00.00 INITIAL MEDICARE ANNUAL WELLNESS VISIT: Primary | ICD-10-CM

## 2023-03-29 PROBLEM — J43.9 PULMONARY EMPHYSEMA (HCC): Status: RESOLVED | Noted: 2019-08-01 | Resolved: 2023-03-29

## 2023-03-29 RX ORDER — CLOBETASOL PROPIONATE 0.5 MG/G
CREAM TOPICAL
Qty: 60 G | Refills: 3 | Status: SHIPPED | OUTPATIENT
Start: 2023-03-29

## 2023-03-29 SDOH — ECONOMIC STABILITY: INCOME INSECURITY: HOW HARD IS IT FOR YOU TO PAY FOR THE VERY BASICS LIKE FOOD, HOUSING, MEDICAL CARE, AND HEATING?: NOT HARD AT ALL

## 2023-03-29 SDOH — ECONOMIC STABILITY: HOUSING INSECURITY
IN THE LAST 12 MONTHS, WAS THERE A TIME WHEN YOU DID NOT HAVE A STEADY PLACE TO SLEEP OR SLEPT IN A SHELTER (INCLUDING NOW)?: NO

## 2023-03-29 SDOH — ECONOMIC STABILITY: FOOD INSECURITY: WITHIN THE PAST 12 MONTHS, THE FOOD YOU BOUGHT JUST DIDN'T LAST AND YOU DIDN'T HAVE MONEY TO GET MORE.: NEVER TRUE

## 2023-03-29 SDOH — ECONOMIC STABILITY: FOOD INSECURITY: WITHIN THE PAST 12 MONTHS, YOU WORRIED THAT YOUR FOOD WOULD RUN OUT BEFORE YOU GOT MONEY TO BUY MORE.: NEVER TRUE

## 2023-03-29 ASSESSMENT — PATIENT HEALTH QUESTIONNAIRE - PHQ9
SUM OF ALL RESPONSES TO PHQ9 QUESTIONS 1 & 2: 0
SUM OF ALL RESPONSES TO PHQ QUESTIONS 1-9: 0
2. FEELING DOWN, DEPRESSED OR HOPELESS: 0
SUM OF ALL RESPONSES TO PHQ QUESTIONS 1-9: 0
1. LITTLE INTEREST OR PLEASURE IN DOING THINGS: 0
SUM OF ALL RESPONSES TO PHQ QUESTIONS 1-9: 0
SUM OF ALL RESPONSES TO PHQ QUESTIONS 1-9: 0

## 2023-03-29 ASSESSMENT — ENCOUNTER SYMPTOMS
APNEA: 0
COUGH: 0
WHEEZING: 0
ABDOMINAL PAIN: 0
SHORTNESS OF BREATH: 0

## 2023-03-29 ASSESSMENT — LIFESTYLE VARIABLES
HOW OFTEN DO YOU HAVE A DRINK CONTAINING ALCOHOL: NEVER
HOW MANY STANDARD DRINKS CONTAINING ALCOHOL DO YOU HAVE ON A TYPICAL DAY: 1 OR 2

## 2023-03-29 NOTE — PROGRESS NOTES
Medicare Annual Wellness Visit    Viral Cruz is here for Medicare AWV    Assessment & Plan   Initial Medicare annual wellness visit      Recommendations for Preventive Services Due: see orders and patient instructions/AVS.  Recommended screening schedule for the next 5-10 years is provided to the patient in written form: see Patient Instructions/AVS.     No follow-ups on file. Subjective   The following acute and/or chronic problems were also addressed today:  Advised pt to get shingles vaccine at the pharmacy, pt agreed. Added Budesonide per patient request to the medication chart. Patient's complete Health Risk Assessment and screening values have been reviewed and are found in Flowsheets. The following problems were reviewed today and where indicated follow up appointments were made and/or referrals ordered. Positive Risk Factor Screenings with Interventions:                  Dentist Screen:  Have you seen the dentist within the past year?: (!) No (pt will schedule an appt, now has insurance)    Intervention:  Advised to schedule with their dentist    Hearing Screen:  Do you or your family notice any trouble with your hearing that hasn't been managed with hearing aids?: (!) Yes (pt has ear wax in left ear)    Interventions:  Patient declines any further evaluation or treatment  Patient has ear wax and has been seen by the PCP     Vision Screen:  Do you have difficulty driving, watching TV, or doing any of your daily activities because of your eyesight?: No  Have you had an eye exam within the past year?: (!) No (advised pt to schedule appt)  No results found.     Interventions:   Patient encouraged to make appointment with their eye specialist      Advanced Directives:  Do you have a Living Will?: (!) No (provided pt with paperwork)    Intervention:  has NO advanced directive - information provided              Objective   Vitals:    03/29/23 1052   BP: 132/74   Site: Right Upper Arm   Position:

## 2023-03-29 NOTE — PATIENT INSTRUCTIONS
Personalized Preventive Plan for Wayne Taylor - 3/29/2023  Medicare offers a range of preventive health benefits. Some of the tests and screenings are paid in full while other may be subject to a deductible, co-insurance, and/or copay. Some of these benefits include a comprehensive review of your medical history including lifestyle, illnesses that may run in your family, and various assessments and screenings as appropriate. After reviewing your medical record and screening and assessments performed today your provider may have ordered immunizations, labs, imaging, and/or referrals for you. A list of these orders (if applicable) as well as your Preventive Care list are included within your After Visit Summary for your review. Other Preventive Recommendations:    A preventive eye exam performed by an eye specialist is recommended every 1-2 years to screen for glaucoma; cataracts, macular degeneration, and other eye disorders. A preventive dental visit is recommended every 6 months. Try to get at least 150 minutes of exercise per week or 10,000 steps per day on a pedometer . Order or download the FREE \"Exercise & Physical Activity: Your Everyday Guide\" from [employer]. Call 8-633.925.5653 or search [employer] online. You need 1853-7811 mg of calcium and 8468-2886 IU of vitamin D per day. It is possible to meet your calcium requirement with diet alone, but a vitamin D supplement is usually necessary to meet this goal.  When exposed to the sun, use a sunscreen that protects against both UVA and UVB radiation with an SPF of 30 or greater. Reapply every 2 to 3 hours or after sweating, drying off with a towel, or swimming. Always wear a seat belt when traveling in a car. Always wear a helmet when riding a bicycle or motorcycle.

## 2023-04-03 ENCOUNTER — OFFICE VISIT (OUTPATIENT)
Dept: FAMILY MEDICINE CLINIC | Age: 78
End: 2023-04-03

## 2023-04-03 VITALS
BODY MASS INDEX: 27.78 KG/M2 | TEMPERATURE: 97.2 F | DIASTOLIC BLOOD PRESSURE: 70 MMHG | HEIGHT: 63 IN | SYSTOLIC BLOOD PRESSURE: 126 MMHG | WEIGHT: 156.8 LBS

## 2023-04-03 DIAGNOSIS — H61.23 BILATERAL IMPACTED CERUMEN: Primary | ICD-10-CM

## 2023-04-03 ASSESSMENT — ENCOUNTER SYMPTOMS
ABDOMINAL PAIN: 0
COUGH: 0
APNEA: 0
SHORTNESS OF BREATH: 0

## 2023-04-03 NOTE — PROGRESS NOTES
fatigue. HENT:  Negative for congestion. Patient presents with: Follow-up: Follow up from 3/29/2023- cerumen impaction of left ear. Patient has used Debrox as instructed       Respiratory:  Negative for apnea, cough and shortness of breath. Cardiovascular:  Negative for chest pain and palpitations. Gastrointestinal:  Negative for abdominal pain. Objective   Physical Exam  Vitals and nursing note reviewed. Constitutional:       Appearance: Normal appearance. HENT:      Head:      Comments: Bilat cerumen impaction  Cardiovascular:      Rate and Rhythm: Normal rate and regular rhythm. Pulmonary:      Effort: No respiratory distress. Breath sounds: No wheezing. Abdominal:      General: There is no distension. Tenderness: There is no abdominal tenderness. Neurological:      Mental Status: She is alert. On this date 4/3/2023 I have spent 37 minutes reviewing previous notes, test results and face to face with the patient discussing the diagnosis and importance of compliance with the treatment plan as well as documenting on the day of the visit.       An electronic signature was used to authenticate this note.    --Liat Brown, DO

## 2023-06-23 ENCOUNTER — OFFICE VISIT (OUTPATIENT)
Dept: FAMILY MEDICINE CLINIC | Age: 78
End: 2023-06-23

## 2023-06-23 VITALS
TEMPERATURE: 98.2 F | SYSTOLIC BLOOD PRESSURE: 124 MMHG | BODY MASS INDEX: 27.46 KG/M2 | DIASTOLIC BLOOD PRESSURE: 80 MMHG | WEIGHT: 155 LBS | HEIGHT: 63 IN

## 2023-06-23 DIAGNOSIS — J01.90 ACUTE BACTERIAL SINUSITIS: Primary | ICD-10-CM

## 2023-06-23 DIAGNOSIS — B96.89 ACUTE BACTERIAL SINUSITIS: Primary | ICD-10-CM

## 2023-06-23 DIAGNOSIS — J02.9 SORETHROAT: ICD-10-CM

## 2023-06-23 RX ORDER — AZITHROMYCIN 250 MG/1
250 TABLET, FILM COATED ORAL SEE ADMIN INSTRUCTIONS
Qty: 6 TABLET | Refills: 0 | Status: SHIPPED | OUTPATIENT
Start: 2023-06-23 | End: 2023-06-28

## 2023-06-23 ASSESSMENT — ENCOUNTER SYMPTOMS
COUGH: 0
SHORTNESS OF BREATH: 0
ABDOMINAL PAIN: 0

## 2023-06-23 NOTE — PROGRESS NOTES
Ginny Goetz (:  1945) is a 65 y.o. female,Established patient, here for evaluation of the following chief complaint(s):  Congestion (Sore throat, headache, trouble swallowing x 2 days  - Negative COVID yesterday )  Ginny Goetz is a 65 y.o. female with the following history as recorded in [facility]:  Patient Active Problem List    Diagnosis Date Noted    Acute pancreatitis 2019    Fever 2019    History of pneumonia, recurrent     Collagenous colitis 2019    Bunion, right foot 2013    Hammertoe 2013    HTN (hypertension) 2013    Hyperlipidemia 2013    Gout, arthritis 2010    IBS (irritable bowel syndrome) 2010    CAD (coronary artery disease) 2010     Current Outpatient Medications   Medication Sig Dispense Refill    clobetasol (TEMOVATE) 0.05 % cream Apply topically 2 times daily. 60 g 3    Budesonide (ENTOCORT EC PO) Take 3 mg by mouth as needed      allopurinol (ZYLOPRIM) 300 MG tablet TAKE 1 TABLET EVERY DAY 90 tablet 2    budesonide-formoterol (SYMBICORT) 160-4.5 MCG/ACT AERO Inhale 2 puffs into the lungs 2 times daily Rinse mouth after use. 1 each 5    aspirin 81 MG EC tablet Take 1 tablet by mouth daily      prochlorperazine (COMPAZINE) 10 MG tablet Take 1 tablet by mouth every 6 hours as needed (nausea.) 100 tablet 3    torsemide (DEMADEX) 5 MG tablet Take 1 tablet by mouth daily      albuterol sulfate HFA (VENTOLIN HFA) 108 (90 Base) MCG/ACT inhaler Inhale 2 puffs into the lungs every 6 hours as needed for Wheezing 18 Inhaler 1    losartan (COZAAR) 50 MG tablet Take 0.5 tablets by mouth daily 30 tablet 0    atorvastatin (LIPITOR) 40 MG tablet Take 1 tablet by mouth daily      Loratadine (CLARITIN PO) Take 10 mg by mouth daily. atenolol (TENORMIN) 25 MG tablet Take 1 tablet by mouth daily       No current facility-administered medications for this visit.      Allergies: Doxycycline, Acetaminophen, Aleve [naproxen], Amoxicillin-pot

## 2023-07-10 ENCOUNTER — OFFICE VISIT (OUTPATIENT)
Dept: FAMILY MEDICINE CLINIC | Age: 78
End: 2023-07-10

## 2023-07-10 ENCOUNTER — HOSPITAL ENCOUNTER (OUTPATIENT)
Age: 78
Discharge: HOME | End: 2023-07-10
Payer: MEDICARE

## 2023-07-10 ENCOUNTER — HOSPITAL ENCOUNTER (OUTPATIENT)
Dept: GENERAL RADIOLOGY | Age: 78
Discharge: HOME | End: 2023-07-10
Payer: MEDICARE

## 2023-07-10 VITALS
DIASTOLIC BLOOD PRESSURE: 76 MMHG | SYSTOLIC BLOOD PRESSURE: 134 MMHG | BODY MASS INDEX: 27.73 KG/M2 | TEMPERATURE: 98.3 F | HEIGHT: 63 IN | WEIGHT: 156.5 LBS

## 2023-07-10 DIAGNOSIS — J01.90 ACUTE BACTERIAL SINUSITIS: Primary | ICD-10-CM

## 2023-07-10 DIAGNOSIS — R05.1 ACUTE COUGH: ICD-10-CM

## 2023-07-10 DIAGNOSIS — B96.89 ACUTE BACTERIAL SINUSITIS: Primary | ICD-10-CM

## 2023-07-10 PROCEDURE — 71046 X-RAY EXAM CHEST 2 VIEWS: CPT

## 2023-07-10 RX ORDER — SULFAMETHOXAZOLE AND TRIMETHOPRIM 800; 160 MG/1; MG/1
1 TABLET ORAL 2 TIMES DAILY
Qty: 20 TABLET | Refills: 0 | Status: SHIPPED | OUTPATIENT
Start: 2023-07-10 | End: 2023-07-20

## 2023-07-10 ASSESSMENT — ENCOUNTER SYMPTOMS
SINUS PAIN: 1
SINUS PRESSURE: 1
ABDOMINAL PAIN: 0

## 2023-07-10 NOTE — PATIENT INSTRUCTIONS
Thank you for choosing [facility]. Please bring a current list of medications to every appointment. Please contact your pharmacy for any prescription refill(s) that you are requesting.

## 2023-07-10 NOTE — PROGRESS NOTES
Teresa Pierce (:  1945) is a 65 y.o. female,Established patient, here for evaluation of the following chief complaint(s):  Follow-up (Follow up from 2023- sinusitis, sore throat. Patient given Zithromax and continues with productive cough, wheezing, head congestion, post nasal drip. Patient denies sore throat, fever)    Teresa Pierce is a 65 y.o. female with the following history as recorded in [facility]:  Patient Active Problem List    Diagnosis Date Noted    Acute pancreatitis 2019    Fever 2019    History of pneumonia, recurrent     Collagenous colitis 2019    Bunion, right foot 2013    Hammertoe 2013    HTN (hypertension) 2013    Hyperlipidemia 2013    Gout, arthritis 2010    IBS (irritable bowel syndrome) 2010    CAD (coronary artery disease) 2010     Current Outpatient Medications   Medication Sig Dispense Refill    clobetasol (TEMOVATE) 0.05 % cream Apply topically 2 times daily. 60 g 3    Budesonide (ENTOCORT EC PO) Take 3 mg by mouth as needed      allopurinol (ZYLOPRIM) 300 MG tablet TAKE 1 TABLET EVERY DAY 90 tablet 2    budesonide-formoterol (SYMBICORT) 160-4.5 MCG/ACT AERO Inhale 2 puffs into the lungs 2 times daily Rinse mouth after use. 1 each 5    aspirin 81 MG EC tablet Take 1 tablet by mouth daily      prochlorperazine (COMPAZINE) 10 MG tablet Take 1 tablet by mouth every 6 hours as needed (nausea.) 100 tablet 3    torsemide (DEMADEX) 5 MG tablet Take 1 tablet by mouth daily      albuterol sulfate HFA (VENTOLIN HFA) 108 (90 Base) MCG/ACT inhaler Inhale 2 puffs into the lungs every 6 hours as needed for Wheezing 18 Inhaler 1    losartan (COZAAR) 50 MG tablet Take 0.5 tablets by mouth daily 30 tablet 0    atorvastatin (LIPITOR) 40 MG tablet Take 1 tablet by mouth daily      Loratadine (CLARITIN PO) Take 10 mg by mouth daily.       atenolol (TENORMIN) 25 MG tablet Take 1 tablet by mouth daily       No current facility-administered

## 2023-07-11 ENCOUNTER — TELEPHONE (OUTPATIENT)
Dept: FAMILY MEDICINE CLINIC | Age: 78
End: 2023-07-11

## 2023-07-11 RX ORDER — CEFUROXIME AXETIL 250 MG/1
250 TABLET ORAL 2 TIMES DAILY
Qty: 20 TABLET | Refills: 0 | Status: SHIPPED | OUTPATIENT
Start: 2023-07-11 | End: 2023-07-21

## 2023-07-11 NOTE — TELEPHONE ENCOUNTER
Patient was seen yesterday for a cough and the antibiotic given is making her very nauseated. Since her xray was normal, she wants to know if she can stop the antibiotic and if not can she get a different antibiotic?     Please advise

## 2023-07-17 NOTE — PROGRESS NOTES
"Interval HPI:   NAEON. BG stable on low doses of insulin  Eatin%  Nausea: No  Hypoglycemia and intervention: No  Fever: No  TPN and/or TF: No      /80 (BP Location: Left arm, Patient Position: Lying)   Pulse 65   Temp 97.9 °F (36.6 °C) (Oral)   Resp 18   Ht 5' 11" (1.803 m)   Wt 100.9 kg (222 lb 7.1 oz)   SpO2 99%   BMI 31.02 kg/m²     Labs Reviewed and Include    Recent Labs   Lab 23  0504   *   CALCIUM 8.9   ALBUMIN 3.3*      K 4.1   CO2 24      BUN 56*   CREATININE 8.0*     Lab Results   Component Value Date    WBC 5.49 2023    HGB 8.7 (L) 2023    HCT 28.1 (L) 2023    MCV 85 2023     (L) 2023     No results for input(s): TSH, FREET4 in the last 168 hours.  Lab Results   Component Value Date    HGBA1C 6.4 (H) 2023       Nutritional status:   Body mass index is 31.02 kg/m².  Lab Results   Component Value Date    ALBUMIN 3.3 (L) 2023    ALBUMIN 3.3 (L) 07/15/2023    ALBUMIN 3.4 (L) 2023     No results found for: PREALBUMIN    Estimated Creatinine Clearance: 13.1 mL/min (A) (based on SCr of 8 mg/dL (H)).    Accu-Checks  Recent Labs     07/15/23  0753 07/15/23  1237 07/15/23  1459 07/15/23  1925 23  0814 23  1130 23  1545 23  1919 23  0709 23  1043   POCTGLUCOSE 139* 140* 103 146* 91 151* 100 127* 121* 148*       Current Medications and/or Treatments Impacting Glycemic Control  Immunotherapy:    Immunosuppressants       None          Steroids:   Hormones (From admission, onward)      Start     Stop Route Frequency Ordered    23 1415  melatonin tablet 6 mg         -- Oral Nightly PRN 23 1319          Pressors:    Autonomic Drugs (From admission, onward)      None          Hyperglycemia/Diabetes Medications:   Antihyperglycemics (From admission, onward)      Start     Stop Route Frequency Ordered    23  insulin detemir U-100 (Levemir) pen 2 Units         -- SubQ " Subjective:      Patient ID: Adithya Devine is a 75 y.o. female. Patient presents with:  Fever: fevers up to 102, chills, sweats, just feels bad.since more than 1 m ago. Tired and night sweating.   Adithya Devine is a 75 y.o. female with the following history as recorded in [facility]:  Patient Active Problem List    Steroid withdrawal syndrome without complication ([facility]         Date Noted: 05/13/2019      History of pneumonia, recurrent      Collagenous colitis         Date Noted: 04/21/2019      Sepsis ([facility]         Date Noted: 04/20/2019      Obstructive chronic bronchitis with acute bronchitis ([facility]         Date Noted: 04/01/2019      Bunion, right foot         Date Noted: 12/12/2013      Hammertoe         Date Noted: 12/12/2013      HTN (hypertension)         Date Noted: 12/12/2013      Hyperlipidemia         Date Noted: 12/12/2013      IBS (irritable bowel syndrome)         Date Noted: 06/07/2010      CAD (coronary artery disease)         Date Noted: 06/07/2010      Gout, arthritis         Date Noted: 06/07/2010      IBS (irritable bowel syndrome)         Date Noted: 06/07/2010      CAD (coronary artery disease)         Date Noted: 06/07/2010      Current Outpatient Medications:  budesonide (ENTOCORT EC) 3 MG extended release capsule, Take 1 capsule by mouth 3 times daily, Disp: 90 capsule, Rfl: 3  aspirin 325 MG tablet, Take 325 mg by mouth daily, Disp: , Rfl:   triamterene-hydrochlorothiazide (MAXZIDE-25) 37.5-25 MG per tablet, Take 1 tablet by mouth daily, Disp: , Rfl:   allopurinol (ZYLOPRIM) 300 MG tablet, TAKE 1 TABLET EVERY DAY, Disp: 90 tablet, Rfl: 3  VENTOLIN  (90 Base) MCG/ACT inhaler, INHALE 2 PUFFS INTO THE LUNGS EVERY 6 HOURS AS NEEDED FOR WHEEZING, Disp: 18 Inhaler, Rfl: 1  clobetasol (TEMOVATE) 0.05 % cream, Apply topically 2 times daily, Disp: 60 g, Rfl: 1  losartan (COZAAR) 50 MG tablet, Take 0.5 tablets by mouth daily, Disp: 30 tablet, Rfl: 0  prochlorperazine (COMPAZINE) 10 MG tablet, Take 2 times daily 07/16/23 0906    07/16/23 1130  insulin aspart U-100 pen 2 Units         -- SubQ 3 times daily with meals 07/16/23 0906    07/16/23 1022  insulin aspart U-100 pen 0-5 Units         -- SubQ Before meals & nightly PRN 07/16/23 0922           Tobacco Use      Smoking status: Former Smoker        Packs/day: 1.00        Years: 30.00        Pack years: 30        Types: Cigarettes        Quit date: 3/2/1999        Years since quittin.2      Smokeless tobacco: Never Used    Alcohol use: No      Fever    This is a new problem. The current episode started more than 1 month ago. The problem occurs intermittently. The problem has been unchanged. The maximum temperature noted was 100 to 100.9 F. Associated symptoms include coughing and nausea. Pertinent negatives include no sore throat or urinary pain. Treatments tried: admitted and treated for pneumonia. The treatment provided no relief. Review of Systems   Constitutional: Positive for chills, diaphoresis, fatigue and fever. HENT: Negative for sore throat. Respiratory: Positive for cough. Negative for shortness of breath. Cardiovascular: Negative. Gastrointestinal: Positive for nausea. Genitourinary: Negative. Negative for dysuria. Musculoskeletal: Negative. Skin: Negative. Objective:   Physical Exam   Constitutional: She is oriented to person, place, and time. She appears well-developed and well-nourished. No distress. HENT:   Head: Normocephalic and atraumatic. Right Ear: External ear normal.   Left Ear: External ear normal.   Nose: Nose normal.   Mouth/Throat: Oropharynx is clear and moist. No oropharyngeal exudate. Eyes: Pupils are equal, round, and reactive to light. Conjunctivae and EOM are normal. Right eye exhibits no discharge. Left eye exhibits no discharge. No scleral icterus. Neck: Normal range of motion. Neck supple. No JVD present. No tracheal deviation present. No thyromegaly present. Cardiovascular: Normal rate, regular rhythm, normal heart sounds and intact distal pulses. Exam reveals no gallop and no friction rub. No murmur heard. Pulmonary/Chest: Effort normal. No stridor. No respiratory distress.  She has decreased breath sounds in the right lower field. She has no wheezes. She has no rales. She exhibits no tenderness. Abdominal: Soft. She exhibits no distension and no mass. There is no tenderness. There is no rebound and no guarding. Soft. No organomegaly. Genitourinary: Vagina normal. Rectal exam shows guaiac negative stool. No vaginal discharge found. Musculoskeletal: Normal range of motion. She exhibits no edema or tenderness. No joint pain. Test for Autoimmune disease showed all neg. Lymphadenopathy:     She has no cervical adenopathy. Neurological: She is alert and oriented to person, place, and time. She has normal reflexes. She displays normal reflexes. No cranial nerve deficit. She exhibits normal muscle tone. Coordination normal.   Skin: Skin is warm and dry. No rash noted. She is not diaphoretic. No erythema. No pallor. Psychiatric: She has a normal mood and affect. Her behavior is normal. Judgment and thought content normal.       Assessment:      Encounter Diagnoses   Name Primary?  Fever, unspecified fever cause Yes    Anemia, unspecified type     Obstructive chronic bronchitis with acute bronchitis ([address]     Colitis            Plan:      Gil Gaona was seen today for fever. Diagnoses and all orders for this visit:    Fever, unspecified fever cause  -     CBC    Anemia, unspecified type  -     Iron and TIBC  -     CBC  -     Reticulocytes    Obstructive chronic bronchitis with acute bronchitis ([address]    Colitis      Has appointment with her lung doctor this week.         Lyle Elliott MD

## 2023-07-25 RX ORDER — TORSEMIDE 5 MG/1
5 TABLET ORAL DAILY
Qty: 90 TABLET | Refills: 2 | Status: SHIPPED | OUTPATIENT
Start: 2023-07-25

## 2023-12-05 RX ORDER — ALLOPURINOL 300 MG/1
TABLET ORAL
Qty: 90 TABLET | Refills: 3 | Status: SHIPPED | OUTPATIENT
Start: 2023-12-05

## 2023-12-15 ENCOUNTER — OFFICE VISIT (OUTPATIENT)
Dept: FAMILY MEDICINE CLINIC | Age: 78
End: 2023-12-15

## 2023-12-15 VITALS
BODY MASS INDEX: 28.14 KG/M2 | WEIGHT: 158.8 LBS | TEMPERATURE: 97.6 F | DIASTOLIC BLOOD PRESSURE: 80 MMHG | HEIGHT: 63 IN | SYSTOLIC BLOOD PRESSURE: 134 MMHG

## 2023-12-15 DIAGNOSIS — B96.89 ACUTE BACTERIAL SINUSITIS: Primary | ICD-10-CM

## 2023-12-15 DIAGNOSIS — J01.90 ACUTE BACTERIAL SINUSITIS: Primary | ICD-10-CM

## 2023-12-15 RX ORDER — AZITHROMYCIN 250 MG/1
250 TABLET, FILM COATED ORAL SEE ADMIN INSTRUCTIONS
Qty: 6 TABLET | Refills: 0 | Status: SHIPPED | OUTPATIENT
Start: 2023-12-15 | End: 2023-12-20

## 2023-12-15 NOTE — PROGRESS NOTES
1. Acute bacterial sinusitis           Outpatient Encounter Medications as of 12/15/2023   Medication Sig Dispense Refill    azithromycin (ZITHROMAX) 250 MG tablet Take 1 tablet by mouth See Admin Instructions for 5 days 500mg on day 1 followed by 250mg on days 2 - 5 6 tablet 0    allopurinol (ZYLOPRIM) 300 MG tablet TAKE 1 TABLET EVERY DAY 90 tablet 3    torsemide (DEMADEX) 5 MG tablet Take 1 tablet by mouth daily 90 tablet 2    clobetasol (TEMOVATE) 0.05 % cream Apply topically 2 times daily. 60 g 3    Budesonide (ENTOCORT EC PO) Take 3 mg by mouth as needed      budesonide-formoterol (SYMBICORT) 160-4.5 MCG/ACT AERO Inhale 2 puffs into the lungs 2 times daily Rinse mouth after use. 1 each 5    aspirin 81 MG EC tablet Take 1 tablet by mouth daily      prochlorperazine (COMPAZINE) 10 MG tablet Take 1 tablet by mouth every 6 hours as needed (nausea.) 100 tablet 3    albuterol sulfate HFA (VENTOLIN HFA) 108 (90 Base) MCG/ACT inhaler Inhale 2 puffs into the lungs every 6 hours as needed for Wheezing 18 Inhaler 1    losartan (COZAAR) 50 MG tablet Take 0.5 tablets by mouth daily 30 tablet 0    atorvastatin (LIPITOR) 40 MG tablet Take 1 tablet by mouth daily      Loratadine (CLARITIN PO) Take 10 mg by mouth daily. atenolol (TENORMIN) 25 MG tablet Take 1 tablet by mouth daily       No facility-administered encounter medications on file as of 12/15/2023. No orders of the defined types were placed in this encounter. Subjective   SUBJECTIVE/OBJECTIVE:  HPI    Review of Systems   Constitutional:  Negative for chills, diaphoresis and fatigue. HENT:  Positive for congestion, postnasal drip, rhinorrhea and sinus pressure. Respiratory:  Positive for cough. Negative for shortness of breath. Cardiovascular:  Negative for chest pain and palpitations. Gastrointestinal:  Negative for abdominal pain. Genitourinary:  Negative for dysuria and frequency.           Objective   Physical Exam  Vitals and

## 2024-01-30 ENCOUNTER — OFFICE VISIT (OUTPATIENT)
Dept: FAMILY MEDICINE CLINIC | Age: 79
End: 2024-01-30
Payer: MEDICARE

## 2024-01-30 VITALS
WEIGHT: 158 LBS | BODY MASS INDEX: 28 KG/M2 | DIASTOLIC BLOOD PRESSURE: 70 MMHG | TEMPERATURE: 97 F | HEIGHT: 63 IN | SYSTOLIC BLOOD PRESSURE: 130 MMHG

## 2024-01-30 DIAGNOSIS — B96.89 ACUTE BACTERIAL SINUSITIS: Primary | ICD-10-CM

## 2024-01-30 DIAGNOSIS — J01.90 ACUTE BACTERIAL SINUSITIS: Primary | ICD-10-CM

## 2024-01-30 PROCEDURE — 1123F ACP DISCUSS/DSCN MKR DOCD: CPT | Performed by: INTERNAL MEDICINE

## 2024-01-30 PROCEDURE — 3078F DIAST BP <80 MM HG: CPT | Performed by: INTERNAL MEDICINE

## 2024-01-30 PROCEDURE — G8484 FLU IMMUNIZE NO ADMIN: HCPCS | Performed by: INTERNAL MEDICINE

## 2024-01-30 PROCEDURE — G8427 DOCREV CUR MEDS BY ELIG CLIN: HCPCS | Performed by: INTERNAL MEDICINE

## 2024-01-30 PROCEDURE — G8399 PT W/DXA RESULTS DOCUMENT: HCPCS | Performed by: INTERNAL MEDICINE

## 2024-01-30 PROCEDURE — 3075F SYST BP GE 130 - 139MM HG: CPT | Performed by: INTERNAL MEDICINE

## 2024-01-30 PROCEDURE — 1090F PRES/ABSN URINE INCON ASSESS: CPT | Performed by: INTERNAL MEDICINE

## 2024-01-30 PROCEDURE — G8417 CALC BMI ABV UP PARAM F/U: HCPCS | Performed by: INTERNAL MEDICINE

## 2024-01-30 PROCEDURE — 1036F TOBACCO NON-USER: CPT | Performed by: INTERNAL MEDICINE

## 2024-01-30 PROCEDURE — 99213 OFFICE O/P EST LOW 20 MIN: CPT | Performed by: INTERNAL MEDICINE

## 2024-01-30 RX ORDER — AZITHROMYCIN 250 MG/1
250 TABLET, FILM COATED ORAL SEE ADMIN INSTRUCTIONS
Qty: 6 TABLET | Refills: 0 | Status: SHIPPED | OUTPATIENT
Start: 2024-01-30 | End: 2024-02-04

## 2024-01-30 ASSESSMENT — ENCOUNTER SYMPTOMS
SHORTNESS OF BREATH: 0
WHEEZING: 0
ABDOMINAL PAIN: 0
RHINORRHEA: 1
COUGH: 1

## 2024-01-30 ASSESSMENT — PATIENT HEALTH QUESTIONNAIRE - PHQ9
SUM OF ALL RESPONSES TO PHQ QUESTIONS 1-9: 0
2. FEELING DOWN, DEPRESSED OR HOPELESS: 0
SUM OF ALL RESPONSES TO PHQ QUESTIONS 1-9: 0
1. LITTLE INTEREST OR PLEASURE IN DOING THINGS: 0
SUM OF ALL RESPONSES TO PHQ QUESTIONS 1-9: 0
SUM OF ALL RESPONSES TO PHQ9 QUESTIONS 1 & 2: 0
SUM OF ALL RESPONSES TO PHQ QUESTIONS 1-9: 0

## 2024-01-30 NOTE — PROGRESS NOTES
Raquel Dos Santos (:  1945) is a 78 y.o. female,Established patient, here for evaluation of the following chief complaint(s):  Sinusitis (Patient c/o non-productive cough, headache x 4 days; post nasal drip, nasal drainage, sinus pressure.  Patient has taken OTC NyQuil)  Raquel Dos Santos is a 78 y.o. female with the following history as recorded in [facility]:  Patient Active Problem List    Diagnosis Date Noted    Acute pancreatitis 2019    Fever 2019    History of pneumonia, recurrent     Collagenous colitis 2019    Bunion, right foot 2013    Hammertoe 2013    HTN (hypertension) 2013    Hyperlipidemia 2013    Gout, arthritis 2010    IBS (irritable bowel syndrome) 2010    CAD (coronary artery disease) 2010     Current Outpatient Medications   Medication Sig Dispense Refill    allopurinol (ZYLOPRIM) 300 MG tablet TAKE 1 TABLET EVERY DAY 90 tablet 3    torsemide (DEMADEX) 5 MG tablet Take 1 tablet by mouth daily 90 tablet 2    clobetasol (TEMOVATE) 0.05 % cream Apply topically 2 times daily. 60 g 3    Budesonide (ENTOCORT EC PO) Take 3 mg by mouth as needed      budesonide-formoterol (SYMBICORT) 160-4.5 MCG/ACT AERO Inhale 2 puffs into the lungs 2 times daily Rinse mouth after use. 1 each 5    aspirin 81 MG EC tablet Take 1 tablet by mouth daily      prochlorperazine (COMPAZINE) 10 MG tablet Take 1 tablet by mouth every 6 hours as needed (nausea.) 100 tablet 3    albuterol sulfate HFA (VENTOLIN HFA) 108 (90 Base) MCG/ACT inhaler Inhale 2 puffs into the lungs every 6 hours as needed for Wheezing 18 Inhaler 1    losartan (COZAAR) 50 MG tablet Take 0.5 tablets by mouth daily 30 tablet 0    atorvastatin (LIPITOR) 40 MG tablet Take 1 tablet by mouth daily      Loratadine (CLARITIN PO) Take 10 mg by mouth daily.      atenolol (TENORMIN) 25 MG tablet Take 1 tablet by mouth daily       No current facility-administered medications for this visit.     Allergies:

## 2024-01-30 NOTE — PATIENT INSTRUCTIONS
Thank you for choosing [facility].    Please bring a current list of medications to every appointment.    Please contact your pharmacy for any prescription refill(s) that you are requesting.

## 2024-02-14 RX ORDER — BUDESONIDE AND FORMOTEROL FUMARATE DIHYDRATE 160; 4.5 UG/1; UG/1
2 AEROSOL RESPIRATORY (INHALATION) 2 TIMES DAILY
Qty: 1 EACH | Refills: 5 | Status: SHIPPED | OUTPATIENT
Start: 2024-02-14

## 2024-03-13 RX ORDER — TORSEMIDE 5 MG/1
5 TABLET ORAL DAILY
Qty: 90 TABLET | Refills: 1 | Status: SHIPPED | OUTPATIENT
Start: 2024-03-13

## 2024-03-13 NOTE — TELEPHONE ENCOUNTER
Pt has not had annual exam/lab work in a few years, please have pt schedule appt in the next month or two

## 2024-04-16 NOTE — TELEPHONE ENCOUNTER
Pt has been experiencing, coughing and low grade fever x4 days. Wanting to be seen today but no availability. Please follow up. SUBJECTIVE:   Dory is a 42 year old female who presents today for follow up.    1) vertigo: lately has been noticing a lot of vertigo.  Had gone away completely and back 2 mo ago. Not sick when it comes back. Does feel like she has a lot of phlegm in her throat.   Went to ER 2/24/2024 - thought it was vertigo  Seeing ENT 5/1  Some ringing in one ear.  Did go to therapy for this  If she does a feast movement, doesn't feel normal.  If someone pushes her it feels like she is going to fall.   Not every day, some days.  When she was in ER - told her they should check electrolytes  Meclizine takes her really sleepy.     2) Type II DM:   Last checked 3/2023 - 5.7. Had done good lifestyle changes for this.  But doesn't know where it is now  Saw regular eye doctor - usually goes at the end of the year, has to ask about diabetic eye exam.   Lipid panel 11/2022 - not on statin    3) Hypothyroidism: taking 1.5 tabs of synthroid in the morning  Finally had normalized 4/2023    4) Vit D: takes 2 a day     5) Health maintenance:  -Immunizations: declines Tdap or covid booster.    -Heart health: paternal GF had a heart attack, both parents have elevated cholesterol. No DM in family. Lipid and DM screening as above  -Cancer screening:             -Family history of early cancers: maternal GF with prostate cancer.              -Breast cancer screening: mammo 12/2022             -Cervical cancer screening: doesn't know when last PAP was, but no history of abnormals  -Menstrual and pregnancy history: gets regular periods  -Sexual activity/prior STD screening/contraception: no concerns  -Bone health: vit d as above  -Smoking, alcohol: alcohol very rarely, no smoking  -Exercise: nothing right now  -Mental health:  things are going okay, feels like vertigo is really limiting.     Recent PHQ 2/9 Score    PHQ 2:  PHQ 2 Score Adult PHQ 2 Score Adult PHQ 2 Interpretation Little interest or pleasure in activity?   4/16/2024   1:34 PM 0 No  further screening needed 0       PHQ 9:            6) Social:  -Employment: Works for [employer]  -Marriage status/family information: lives on her own.     I have reviewed the patient's medications and allergies, past medical, surgical, social and family history, updating these as appropriate.  See History section of the EMR for a display of this information.    ROS: The patient denies symptoms of:    GENERAL: fever, fatigue, weight loss, and weight gain  SKIN: rash and lumps or bumps or moles that are changing  EYES: visual blurring  EARS: pain in the ears  MOUTH: abnormality of the teeth or gums  NECK: swelling or masses in the neck  CARDIORESPIRATORY: chest pain, cough, and shortness of breath  GASTROINTESTINAL: abdominal pain and change in the bowel habits  GENITOURINARY/FEMALE: frequency and dysuria  NEUROLOGIC: headache, weakness, and numbness  MUSCULOSKELETAL: joint stiffness and joint pain. +vertigo as above  PSYCHIATRIC: symptoms of depression, feeling sad or blue    OBJECTIVE: Examination of the patient reveals:     Visit Vitals  /72   Pulse 89   Ht 5' 1\" (1.549 m)   Wt 83.8 kg (184 lb 13.7 oz)   LMP 04/01/2024 (Exact Date)   SpO2 94%   BMI 34.93 kg/m²         GENERAL: appears stated age, is in no apparent distress, and is well developed and well nourished  LYMPH NODES: no cervical adenopathy  SKIN no skin rashes  HEAD: normocephalic  EYES: pupils are equal and reactive to light and accomodation, sclera and conjunctiva are normal, and lids and lashes are normal  EARS: pinna and external ear is normal bilaterally, external auditory canals are normal, and tympanic membranes are normal, ?some clear fluid noted behind  MOUTH/THROAT: oropharynx appears normal, soft palate and uvula are normal, and oral mucosa normal  NECK: neck is supple, no thyromegaly, no anterior cervical adenopathy, and no posterior cervical adenopathy  LUNGS: lungs are clear to auscultation with normal inspiratory/expiratory sounds, no  rales, no rhonchi, and no wheezes  HEART: normal rate and rhythm, S1 and S2 normal, no S3 or S4, no murmurs, and no extra heart sounds  ABDOMEN: abdomen is soft, normal active bowel sounds, and nontender  BACK: no discomfort to palpation of the midline and no discomfort to palpation of the paraspinal musculature  NEUROLOGIC: motor strength normal, gait and station normal, coordination normal, DTR's normal and symmetric, and no tremor noted  EXTREMITIES: no edema and normal muscle tone and development bilaterally               Diabetic Foot Exam Documentation     Normal Bilateral Foot Exam:  Skin integrity is normal. Dorsalis pedis and posterior tibial pulses are present.  Pressure sensation using the [place] monofilament is present.            ASSESSMENT / PLAN:     1) vertigo: ears with some mild effusion, potentially related to seasonal allergies. Also previously had significant metabolic disturbances with diagnosis of diabetes as well as hypothyroidism - will recheck labs today and plan on follow up with ENT and audiology as scheduled.       2) Type II DM:   Recheck A1C today - was just into border of diagnosis for DM previously  Encouraged her to ask about diabetic eye exam  Update CMP/microalbumin today  Update cholesterol today - should likely be on statin for ASVCD protection     3) Hypothyroidism: check TSH today     4) Vit D: check vit d today     5) Health maintenance:   -Immunizations: Declines updating any vaccines today  -Heart health: cholesterol and DM screening ordered  -Cancer screening:             -Breast cancer screening: mammo ordered             -Cervical cancer screening: she wants to come back for PAP  -Menstrual and pregnancy history: gets regular periods  -Sexual activity/prior STD screening/contraception: no concerns  -Bone health: Adequate calcium and vitamin D intake were reviewed today. We also discussed the importance of weight bearing exercise.  Check vit d today  -Lifestyle:  healthy diet and regular CV exercise were reviewed with a goal of 150min per week of moderate intensity exersice             -Tobacco: denies             -Alcohol: counseled to drink within acceptable gender amounts             -Routine dental care was encouraged  -Mental health: doing well     Return in about 6 months (around 10/16/2024) for follow up .    In addition to discussion and management of preventative health topics, we discussed the additional problems, as outlined above.    This includes chart review, documenting, and discussing plan of care with pt .       Margarette Browning MD  Internal Medicine - Pediatrics

## 2024-04-22 ENCOUNTER — TELEPHONE (OUTPATIENT)
Dept: FAMILY MEDICINE CLINIC | Age: 79
End: 2024-04-22

## 2024-04-22 NOTE — TELEPHONE ENCOUNTER
LM for patient to ask if they want to establish Jackie as her PCP. Patient will need to reschedule her AWV.

## 2024-08-07 RX ORDER — TORSEMIDE 5 MG/1
5 TABLET ORAL DAILY
Qty: 90 TABLET | Refills: 1 | Status: SHIPPED | OUTPATIENT
Start: 2024-08-07

## 2024-09-01 ENCOUNTER — HOSPITAL ENCOUNTER (INPATIENT)
Age: 79
LOS: 3 days | Discharge: HOME | End: 2024-09-04
Attending: INTERNAL MEDICINE | Admitting: INTERNAL MEDICINE
Payer: MEDICARE

## 2024-09-01 ENCOUNTER — HOSPITAL ENCOUNTER (EMERGENCY)
Age: 79
Discharge: ANOTHER HOSPITAL | End: 2024-09-01
Attending: EMERGENCY MEDICINE
Payer: MEDICARE

## 2024-09-01 ENCOUNTER — APPOINTMENT (OUTPATIENT)
Dept: GENERAL RADIOLOGY | Age: 79
End: 2024-09-01
Payer: MEDICARE

## 2024-09-01 VITALS
RESPIRATION RATE: 20 BRPM | OXYGEN SATURATION: 97 % | SYSTOLIC BLOOD PRESSURE: 192 MMHG | DIASTOLIC BLOOD PRESSURE: 80 MMHG | HEART RATE: 82 BPM | BODY MASS INDEX: 27.45 KG/M2 | TEMPERATURE: 97.6 F | WEIGHT: 154.98 LBS

## 2024-09-01 DIAGNOSIS — R04.0 EPISTAXIS: Primary | ICD-10-CM

## 2024-09-01 DIAGNOSIS — J18.9 PNEUMONIA OF RIGHT LOWER LOBE DUE TO INFECTIOUS ORGANISM: ICD-10-CM

## 2024-09-01 DIAGNOSIS — R05.2 SUBACUTE COUGH: ICD-10-CM

## 2024-09-01 DIAGNOSIS — R09.02 HYPOXEMIA: ICD-10-CM

## 2024-09-01 LAB
ALBUMIN SERPL-MCNC: 3.9 G/DL (ref 3.4–5)
ALBUMIN/GLOB SERPL: 1.2 {RATIO} (ref 1.1–2.2)
ALP SERPL-CCNC: 128 U/L (ref 40–129)
ALT SERPL-CCNC: 8 U/L (ref 10–40)
ANION GAP SERPL CALCULATED.3IONS-SCNC: 9 MMOL/L (ref 3–16)
APTT BLD: 23.7 SEC (ref 22.1–36.4)
AST SERPL-CCNC: 17 U/L (ref 15–37)
BASE EXCESS BLDV CALC-SCNC: 0.7 MMOL/L (ref -3–3)
BASOPHILS # BLD: 0 K/UL (ref 0–0.2)
BASOPHILS # BLD: 0.1 K/UL (ref 0–0.2)
BASOPHILS NFR BLD: 0.3 %
BASOPHILS NFR BLD: 0.7 %
BILIRUB SERPL-MCNC: <0.2 MG/DL (ref 0–1)
BUN SERPL-MCNC: 22 MG/DL (ref 7–20)
CALCIUM SERPL-MCNC: 9.6 MG/DL (ref 8.3–10.6)
CHLORIDE SERPL-SCNC: 107 MMOL/L (ref 99–110)
CO2 BLDV-SCNC: 66 MMOL/L
CO2 SERPL-SCNC: 27 MMOL/L (ref 21–32)
COHGB MFR BLDV: 3.2 % (ref 0–1.5)
CREAT SERPL-MCNC: 1 MG/DL (ref 0.6–1.2)
DEPRECATED RDW RBC AUTO: 14.2 % (ref 12.4–15.4)
DEPRECATED RDW RBC AUTO: 15.2 % (ref 12.4–15.4)
DO-HGB MFR BLDV: 12 %
EOSINOPHIL # BLD: 0.1 K/UL (ref 0–0.6)
EOSINOPHIL # BLD: 0.4 K/UL (ref 0–0.6)
EOSINOPHIL NFR BLD: 0.4 %
EOSINOPHIL NFR BLD: 3.5 %
GFR SERPLBLD CREATININE-BSD FMLA CKD-EPI: 57 ML/MIN/{1.73_M2}
GLUCOSE SERPL-MCNC: 112 MG/DL (ref 70–99)
HCO3 BLDV-SCNC: 27.6 MMOL/L (ref 23–29)
HCT VFR BLD AUTO: 37.3 % (ref 36–48)
HCT VFR BLD AUTO: 37.9 % (ref 36–48)
HCT VFR BLD AUTO: 38.1 % (ref 36–48)
HGB BLD-MCNC: 11.8 G/DL (ref 12–16)
HGB BLD-MCNC: 12.1 G/DL (ref 12–16)
HGB BLD-MCNC: 12.2 G/DL (ref 12–16)
IMM GRANULOCYTES # BLD: 0 K/UL (ref 0–0.2)
IMM GRANULOCYTES NFR BLD: 0.1 %
INR PPP: 0.89 (ref 0.85–1.15)
LACTATE BLDV-SCNC: 1.8 MMOL/L (ref 0.4–1.9)
LACTATE BLDV-SCNC: 4.3 MMOL/L (ref 0.4–1.9)
LYMPHOCYTES # BLD: 1.3 K/UL (ref 1–5.1)
LYMPHOCYTES # BLD: 2.1 K/UL (ref 1–5.1)
LYMPHOCYTES NFR BLD: 10.8 %
LYMPHOCYTES NFR BLD: 20 %
MCH RBC QN AUTO: 29.4 PG (ref 26–34)
MCH RBC QN AUTO: 29.5 PG (ref 26–34)
MCHC RBC AUTO-ENTMCNC: 31.9 G/DL (ref 32–36.4)
MCHC RBC AUTO-ENTMCNC: 32.1 G/DL (ref 31–36)
MCV RBC AUTO: 91.9 FL (ref 80–100)
MCV RBC AUTO: 92.2 FL (ref 80–100)
METHGB MFR BLDV: 0.7 %
MONOCYTES # BLD: 0.5 K/UL (ref 0–1.3)
MONOCYTES # BLD: 0.7 K/UL (ref 0–1.3)
MONOCYTES NFR BLD: 4.3 %
MONOCYTES NFR BLD: 6.8 %
NEUTROPHILS # BLD: 10 K/UL (ref 1.7–7.7)
NEUTROPHILS # BLD: 7.4 K/UL (ref 1.7–7.7)
NEUTROPHILS NFR BLD: 69.3 %
NEUTROPHILS NFR BLD: 83.8 %
O2 CT VFR BLDV CALC: 15 VOL %
O2 THERAPY: ABNORMAL
PCO2 BLDV: 53.2 MMHG (ref 40–50)
PH BLDV: 7.32 [PH] (ref 7.35–7.45)
PLATELET # BLD AUTO: 307 K/UL (ref 135–450)
PLATELET # BLD AUTO: 325 K/UL (ref 135–450)
PMV BLD AUTO: 8.1 FL (ref 5–10.5)
PMV BLD AUTO: 9.3 FL (ref 5–10.5)
PO2 BLDV: 57.8 MMHG (ref 25–40)
POTASSIUM SERPL-SCNC: 3.6 MMOL/L (ref 3.5–5.1)
PROCALCITONIN SERPL IA-MCNC: 0.04 NG/ML (ref 0–0.15)
PROT SERPL-MCNC: 7.2 G/DL (ref 6.4–8.2)
PROTHROMBIN TIME: 12.3 SEC (ref 11.9–14.9)
RBC # BLD AUTO: 4.11 M/UL (ref 4–5.2)
RBC # BLD AUTO: 4.14 M/UL (ref 4–5.2)
SAO2 % BLDV: 88 %
SODIUM SERPL-SCNC: 143 MMOL/L (ref 136–145)
WBC # BLD AUTO: 10.7 K/UL (ref 4–11)
WBC # BLD AUTO: 11.9 K/UL (ref 4–11)

## 2024-09-01 PROCEDURE — 2580000003 HC RX 258: Performed by: INTERNAL MEDICINE

## 2024-09-01 PROCEDURE — 6370000000 HC RX 637 (ALT 250 FOR IP): Performed by: INTERNAL MEDICINE

## 2024-09-01 PROCEDURE — 87040 BLOOD CULTURE FOR BACTERIA: CPT

## 2024-09-01 PROCEDURE — 99285 EMERGENCY DEPT VISIT HI MDM: CPT

## 2024-09-01 PROCEDURE — 99223 1ST HOSP IP/OBS HIGH 75: CPT | Performed by: OTOLARYNGOLOGY

## 2024-09-01 PROCEDURE — 82803 BLOOD GASES ANY COMBINATION: CPT

## 2024-09-01 PROCEDURE — 94760 N-INVAS EAR/PLS OXIMETRY 1: CPT

## 2024-09-01 PROCEDURE — 6370000000 HC RX 637 (ALT 250 FOR IP): Performed by: PHYSICIAN ASSISTANT

## 2024-09-01 PROCEDURE — 83605 ASSAY OF LACTIC ACID: CPT

## 2024-09-01 PROCEDURE — 6360000002 HC RX W HCPCS: Performed by: EMERGENCY MEDICINE

## 2024-09-01 PROCEDURE — 85610 PROTHROMBIN TIME: CPT

## 2024-09-01 PROCEDURE — 6360000002 HC RX W HCPCS: Performed by: INTERNAL MEDICINE

## 2024-09-01 PROCEDURE — 6360000002 HC RX W HCPCS: Performed by: PHYSICIAN ASSISTANT

## 2024-09-01 PROCEDURE — 2580000003 HC RX 258: Performed by: PHYSICIAN ASSISTANT

## 2024-09-01 PROCEDURE — 2060000000 HC ICU INTERMEDIATE R&B

## 2024-09-01 PROCEDURE — 30905 CONTROL OF NOSEBLEED: CPT

## 2024-09-01 PROCEDURE — 36415 COLL VENOUS BLD VENIPUNCTURE: CPT

## 2024-09-01 PROCEDURE — 80053 COMPREHEN METABOLIC PANEL: CPT

## 2024-09-01 PROCEDURE — 6370000000 HC RX 637 (ALT 250 FOR IP): Performed by: EMERGENCY MEDICINE

## 2024-09-01 PROCEDURE — 96374 THER/PROPH/DIAG INJ IV PUSH: CPT

## 2024-09-01 PROCEDURE — 2500000003 HC RX 250 WO HCPCS: Performed by: EMERGENCY MEDICINE

## 2024-09-01 PROCEDURE — 85014 HEMATOCRIT: CPT

## 2024-09-01 PROCEDURE — 85018 HEMOGLOBIN: CPT

## 2024-09-01 PROCEDURE — 30905 CONTROL OF NOSEBLEED: CPT | Performed by: OTOLARYNGOLOGY

## 2024-09-01 PROCEDURE — 30901 CONTROL OF NOSEBLEED: CPT

## 2024-09-01 PROCEDURE — 85025 COMPLETE CBC W/AUTO DIFF WBC: CPT

## 2024-09-01 PROCEDURE — 84145 PROCALCITONIN (PCT): CPT

## 2024-09-01 PROCEDURE — 94640 AIRWAY INHALATION TREATMENT: CPT

## 2024-09-01 PROCEDURE — 71045 X-RAY EXAM CHEST 1 VIEW: CPT

## 2024-09-01 PROCEDURE — 6370000000 HC RX 637 (ALT 250 FOR IP): Performed by: NURSE PRACTITIONER

## 2024-09-01 PROCEDURE — 093K7ZZ CONTROL BLEEDING IN NASAL MUCOSA AND SOFT TISSUE, VIA NATURAL OR ARTIFICIAL OPENING: ICD-10-PCS | Performed by: INTERNAL MEDICINE

## 2024-09-01 PROCEDURE — 85730 THROMBOPLASTIN TIME PARTIAL: CPT

## 2024-09-01 RX ORDER — ONDANSETRON 4 MG/1
4 TABLET, ORALLY DISINTEGRATING ORAL EVERY 8 HOURS PRN
Status: DISCONTINUED | OUTPATIENT
Start: 2024-09-01 | End: 2024-09-04 | Stop reason: HOSPADM

## 2024-09-01 RX ORDER — IPRATROPIUM BROMIDE AND ALBUTEROL SULFATE 2.5; .5 MG/3ML; MG/3ML
1 SOLUTION RESPIRATORY (INHALATION) ONCE
Status: COMPLETED | OUTPATIENT
Start: 2024-09-01 | End: 2024-09-01

## 2024-09-01 RX ORDER — TRANEXAMIC ACID 100 MG/ML
100 INJECTION, SOLUTION INTRAVENOUS ONCE
Status: DISCONTINUED | OUTPATIENT
Start: 2024-09-01 | End: 2024-09-01 | Stop reason: HOSPADM

## 2024-09-01 RX ORDER — ATORVASTATIN CALCIUM 80 MG/1
40 TABLET, FILM COATED ORAL NIGHTLY
Status: DISCONTINUED | OUTPATIENT
Start: 2024-09-01 | End: 2024-09-04 | Stop reason: HOSPADM

## 2024-09-01 RX ORDER — SODIUM CHLORIDE 9 MG/ML
INJECTION, SOLUTION INTRAVENOUS CONTINUOUS
Status: DISCONTINUED | OUTPATIENT
Start: 2024-09-01 | End: 2024-09-03

## 2024-09-01 RX ORDER — HYDROMORPHONE HYDROCHLORIDE 1 MG/ML
1 INJECTION, SOLUTION INTRAMUSCULAR; INTRAVENOUS; SUBCUTANEOUS ONCE
Status: COMPLETED | OUTPATIENT
Start: 2024-09-01 | End: 2024-09-01

## 2024-09-01 RX ORDER — SODIUM CHLORIDE 9 MG/ML
INJECTION, SOLUTION INTRAVENOUS PRN
Status: DISCONTINUED | OUTPATIENT
Start: 2024-09-01 | End: 2024-09-04 | Stop reason: HOSPADM

## 2024-09-01 RX ORDER — HYDRALAZINE HYDROCHLORIDE 20 MG/ML
10 INJECTION INTRAMUSCULAR; INTRAVENOUS EVERY 8 HOURS PRN
Status: DISCONTINUED | OUTPATIENT
Start: 2024-09-01 | End: 2024-09-04 | Stop reason: HOSPADM

## 2024-09-01 RX ORDER — ONDANSETRON 2 MG/ML
4 INJECTION INTRAMUSCULAR; INTRAVENOUS EVERY 6 HOURS PRN
Status: DISCONTINUED | OUTPATIENT
Start: 2024-09-01 | End: 2024-09-04 | Stop reason: HOSPADM

## 2024-09-01 RX ORDER — POLYETHYLENE GLYCOL 3350 17 G/17G
17 POWDER, FOR SOLUTION ORAL DAILY PRN
Status: DISCONTINUED | OUTPATIENT
Start: 2024-09-01 | End: 2024-09-04 | Stop reason: HOSPADM

## 2024-09-01 RX ORDER — IPRATROPIUM BROMIDE AND ALBUTEROL SULFATE 2.5; .5 MG/3ML; MG/3ML
1 SOLUTION RESPIRATORY (INHALATION)
Status: DISCONTINUED | OUTPATIENT
Start: 2024-09-01 | End: 2024-09-04 | Stop reason: HOSPADM

## 2024-09-01 RX ORDER — OXYMETAZOLINE HYDROCHLORIDE 0.05 G/100ML
SPRAY NASAL
Status: DISPENSED
Start: 2024-09-01 | End: 2024-09-02

## 2024-09-01 RX ORDER — ATENOLOL 50 MG/1
25 TABLET ORAL DAILY
Status: DISCONTINUED | OUTPATIENT
Start: 2024-09-01 | End: 2024-09-04 | Stop reason: HOSPADM

## 2024-09-01 RX ORDER — SODIUM CHLORIDE 0.9 % (FLUSH) 0.9 %
5-40 SYRINGE (ML) INJECTION PRN
Status: DISCONTINUED | OUTPATIENT
Start: 2024-09-01 | End: 2024-09-04 | Stop reason: HOSPADM

## 2024-09-01 RX ORDER — SODIUM CHLORIDE 0.9 % (FLUSH) 0.9 %
5-40 SYRINGE (ML) INJECTION EVERY 12 HOURS SCHEDULED
Status: DISCONTINUED | OUTPATIENT
Start: 2024-09-01 | End: 2024-09-04 | Stop reason: HOSPADM

## 2024-09-01 RX ORDER — LOSARTAN POTASSIUM 25 MG/1
50 TABLET ORAL DAILY
Status: DISCONTINUED | OUTPATIENT
Start: 2024-09-01 | End: 2024-09-04 | Stop reason: HOSPADM

## 2024-09-01 RX ORDER — LANOLIN ALCOHOL/MO/W.PET/CERES
3 CREAM (GRAM) TOPICAL NIGHTLY PRN
Status: DISCONTINUED | OUTPATIENT
Start: 2024-09-01 | End: 2024-09-04 | Stop reason: HOSPADM

## 2024-09-01 RX ORDER — ALBUTEROL SULFATE 0.83 MG/ML
2.5 SOLUTION RESPIRATORY (INHALATION) EVERY 6 HOURS PRN
Status: DISCONTINUED | OUTPATIENT
Start: 2024-09-01 | End: 2024-09-04 | Stop reason: HOSPADM

## 2024-09-01 RX ORDER — MORPHINE SULFATE 2 MG/ML
2 INJECTION, SOLUTION INTRAMUSCULAR; INTRAVENOUS ONCE
Status: COMPLETED | OUTPATIENT
Start: 2024-09-01 | End: 2024-09-01

## 2024-09-01 RX ORDER — NEOMYCIN/BACITRACIN/POLYMYXINB 3.5-400-5K
OINTMENT (GRAM) TOPICAL
Status: DISPENSED
Start: 2024-09-01 | End: 2024-09-02

## 2024-09-01 RX ORDER — IPRATROPIUM BROMIDE AND ALBUTEROL SULFATE 2.5; .5 MG/3ML; MG/3ML
1 SOLUTION RESPIRATORY (INHALATION) EVERY 4 HOURS PRN
Status: DISCONTINUED | OUTPATIENT
Start: 2024-09-01 | End: 2024-09-01

## 2024-09-01 RX ORDER — OXYMETAZOLINE HYDROCHLORIDE 0.05 G/100ML
2 SPRAY NASAL ONCE
Status: DISCONTINUED | OUTPATIENT
Start: 2024-09-01 | End: 2024-09-01 | Stop reason: HOSPADM

## 2024-09-01 RX ADMIN — HYDRALAZINE HYDROCHLORIDE 10 MG: 20 INJECTION INTRAMUSCULAR; INTRAVENOUS at 22:25

## 2024-09-01 RX ADMIN — MORPHINE SULFATE 2 MG: 2 INJECTION, SOLUTION INTRAMUSCULAR; INTRAVENOUS at 07:50

## 2024-09-01 RX ADMIN — SODIUM CHLORIDE: 9 INJECTION, SOLUTION INTRAVENOUS at 18:39

## 2024-09-01 RX ADMIN — IPRATROPIUM BROMIDE AND ALBUTEROL SULFATE 1 DOSE: .5; 3 SOLUTION RESPIRATORY (INHALATION) at 19:54

## 2024-09-01 RX ADMIN — CEFEPIME 2000 MG: 2 INJECTION, POWDER, FOR SOLUTION INTRAVENOUS at 22:29

## 2024-09-01 RX ADMIN — ATENOLOL 25 MG: 50 TABLET ORAL at 18:39

## 2024-09-01 RX ADMIN — HYDRALAZINE HYDROCHLORIDE 10 MG: 20 INJECTION INTRAMUSCULAR; INTRAVENOUS at 11:59

## 2024-09-01 RX ADMIN — ONDANSETRON 4 MG: 2 INJECTION INTRAMUSCULAR; INTRAVENOUS at 15:10

## 2024-09-01 RX ADMIN — MELATONIN TAB 3 MG 3 MG: 3 TAB at 22:25

## 2024-09-01 RX ADMIN — IPRATROPIUM BROMIDE AND ALBUTEROL SULFATE 1 DOSE: .5; 3 SOLUTION RESPIRATORY (INHALATION) at 10:54

## 2024-09-01 RX ADMIN — VANCOMYCIN HYDROCHLORIDE 1000 MG: 1 INJECTION, POWDER, LYOPHILIZED, FOR SOLUTION INTRAVENOUS at 18:44

## 2024-09-01 RX ADMIN — LIDOCAINE HYDROCHLORIDE 10 ML: 10; .005 INJECTION, SOLUTION EPIDURAL; INFILTRATION; INTRACAUDAL; PERINEURAL at 06:26

## 2024-09-01 RX ADMIN — LOSARTAN POTASSIUM 50 MG: 25 TABLET, FILM COATED ORAL at 18:39

## 2024-09-01 RX ADMIN — HYDROMORPHONE HYDROCHLORIDE 1 MG: 1 INJECTION, SOLUTION INTRAMUSCULAR; INTRAVENOUS; SUBCUTANEOUS at 12:01

## 2024-09-01 RX ADMIN — IPRATROPIUM BROMIDE AND ALBUTEROL SULFATE 1 DOSE: .5; 3 SOLUTION RESPIRATORY (INHALATION) at 07:17

## 2024-09-01 RX ADMIN — ATORVASTATIN CALCIUM 40 MG: 80 TABLET, FILM COATED ORAL at 22:25

## 2024-09-01 ASSESSMENT — PAIN DESCRIPTION - ORIENTATION
ORIENTATION: LEFT
ORIENTATION: LEFT

## 2024-09-01 ASSESSMENT — PAIN DESCRIPTION - LOCATION
LOCATION: FACE
LOCATION: NOSE
LOCATION: HEAD

## 2024-09-01 ASSESSMENT — PAIN SCALES - GENERAL
PAINLEVEL_OUTOF10: 10
PAINLEVEL_OUTOF10: 5
PAINLEVEL_OUTOF10: 5

## 2024-09-01 ASSESSMENT — PAIN - FUNCTIONAL ASSESSMENT
PAIN_FUNCTIONAL_ASSESSMENT: NONE - DENIES PAIN
PAIN_FUNCTIONAL_ASSESSMENT: NONE - DENIES PAIN

## 2024-09-01 ASSESSMENT — LIFESTYLE VARIABLES: HOW OFTEN DO YOU HAVE A DRINK CONTAINING ALCOHOL: NEVER

## 2024-09-01 ASSESSMENT — PAIN DESCRIPTION - DESCRIPTORS
DESCRIPTORS: DISCOMFORT
DESCRIPTORS: ACHING;DISCOMFORT
DESCRIPTORS: ACHING

## 2024-09-01 NOTE — PROGRESS NOTES
09/01/24 1300   RT Protocol   History Pulmonary Disease 1   Respiratory pattern 0   Breath sounds 2   Cough 1   Bronchodilator Assessment Score 4

## 2024-09-01 NOTE — ED TRIAGE NOTES
Pt ambulatory to ED with complaint of nosebleed. Pt states she awoke about 30 minutes ago and her nose was bleeding. States she's not sure which nare. States it is running down the back of her throat. Pt has nose clip in place from a previous nose bleed. No active anterior bleeding noted with clip in place. Pt states she had similar episode yesterday around 5pm which spontaneously subsided.

## 2024-09-01 NOTE — CONSULTS
[facility] ENT       Otolaryngology Consultation      Requesting Physician / Provider:   Harmony Pascal MD / Valerie Wang PA-C    Date of Consultation:  9/01/2024        Date of Admission:   09/01/2024      Admission diagnosis:  Epistaxis [R04.0]      History Obtained From:  patient, electronic medical record        IMPRESSION:   Epistaxis, left, anterior versus posterior, secondary to dryness of mucosa possible atherosclerosis of nasal mucosa blood vessels, and aspirin therapy.  Adverse effect of anticoagulant therapy, aspirin      RECOMMENDATIONS:   Continue Merocel nasal pack for 72 hours and then remove.  Further measures for epistaxis as needed.    May be discharged with the nasal pack in place if medically stable, with follow up in office after 72 hours for removal of nasal pack.        CHIEF COMPLAINT / Reason for Consultation:  \"epistaxis\"      HISTORY OF PRESENT ILLNESS:    Raquel Dos Santos is a 78 y.o. female is a new patient to me.  An otolaryngologic consultation was requested for evaluation and management of epistaxis.      Nosebleed started this AM and was profuse prompting her to go to the [facility] ED.  Multipl treatments were rendered culminating in placement of a 5.5 Rapid Rhino balloon epistat.  Bleeding was slowed with their measures but continued.  In addition, she was noted to have hypoxia and pneumonia.  I advised transport to [facility] ER for management of her epistaxis and possible admission.  She stated that she has not had a nosebleed since the most recent prior episode about 3 years ago, treated by Dr. Hicks with cauterization.  She is on daily 81 mg aspirin therapy.        REVIEW OF SYSTEMS:    I reviewed the ROS in the admission history and physical.      Past Medical History:       Diagnosis Date    Allergic rhinitis     Arthritis     gout    Asthma     CAD (coronary artery disease) 6/7/2010    CAD (coronary artery disease)     Colitis,  Daily PRN  albuterol (PROVENTIL) (2.5 MG/3ML) 0.083% nebulizer solution 2.5 mg, 2.5 mg, Nebulization, Q6H PRN  hydrALAZINE (APRESOLINE) injection 10 mg, 10 mg, IntraVENous, Q8H PRN  ipratropium 0.5 mg-albuterol 2.5 mg (DUONEB) nebulizer solution 1 Dose, 1 Dose, Inhalation, BID RT  neomycin-bacitracin-polymyxin (NEOSPORIN) 5-400-5000 ointment, , ,   oxymetazoline (AFRIN) 0.05 % nasal spray, , ,     Allergies:    Allergies   Allergen Reactions    Doxycycline Other (See Comments)     pancreatitis    Acetaminophen Swelling    Aleve [Naproxen] Hives    Amoxicillin-Pot Clavulanate Nausea And Vomiting    Tramadol Swelling    Bactrim Ds [Sulfamethoxazole-Trimethoprim] Nausea Only     dizziness        Social History:    TOBACCO:   reports that she quit smoking about 25 years ago. Her smoking use included cigarettes. She started smoking about 55 years ago. She has a 30 pack-year smoking history. She has never been exposed to tobacco smoke. She has never used smokeless tobacco.  ETOH:   reports no history of alcohol use.    Family History:       Problem Relation Age of Onset    Cancer Father         GI CANCER    No Known Problems Mother     Crohn's Disease Sister     Lung Cancer Brother          PHYSICAL EXAM:      Constitutional:    Vitals:    Vitals:    09/01/24 1302   BP: 135/60   Pulse:    Resp:    Temp:    SpO2: 93%      General appearance:  WDWN, NAD, awake and alert.    Voice:  normal with no hot potato quality or hoarseness.  Nose:  there was a 5.5 anterior Rapid Rhino epipstat in place, with no evidence of active bleeding.  The external nose, nasal mucosa and secretions, inferior nasal turbinates, nasal septum, and nasal vestibules appeared to be normal.  Mouth and Throat:  There was minimal clotted blood on the left posterior oropharyngeal wall with no evidence of active bleeding.  Otherwise, the oral cavity and oropharynx appeared to be normal.    Neck:  Unremarkable    I performed this head and neck physical exam  qualified healthcare professional/appropriate source (not separately reported)  I discussed this case and medical management and need for admission with the two ED providers.  I also discussed the case with Dr. Wing, hospitalist.          Risk of Complications and /or Morbidity or Mortality of Patient Management    60720 - Moderate  Prescription drug management    [id number] - High  Decision regarding hospitalization

## 2024-09-01 NOTE — H&P
HOSPITALISTS HISTORY AND PHYSICAL    9/1/2024 11:32 AM    Patient Information:  JOAN SZYMANSKI is a 78 y.o. female [id number]  PCP:  No primary care provider on file. (Tel: None )    Chief complaint:    Chief Complaint   Patient presents with    Epistaxis     Pt arrives via private transport as a transfer from [facility] to [facility] for ENT services. Currently has rhino rocket in place.         History of Present Illness:  Joan Szymanski is a 78 y.o. female  with PMHx of hypertension, hyperlipidemia, asthma, CAD, collagenous colitis, IBS, COVID-19 and obstructive chronic bronchitis who was transferred from [facility] ED for management of epistaxis.  Per patient report, nosebleed started 5 AM this morning; went to [facility] ED where they were unable to control nosebleed after trying several methods and transferred to [facility] after discussing with ENT Dr.Dr Gaxiola.  Of note, pt was hospitalized for acute hypoxic respiratory failure 2/2 multifocal pneumonia and treated with with azithromycin, cefepime and Zyvox.  Patient had elevated BP on admission.  Afebrile with WBCs 11.1 K.  Hgb stable around 12.  CXR showed right lower lobe pneumonia.      REVIEW OF SYSTEMS:   Detailed 12 point ROS obtained which were negative except what mentioned above in HPI    Past Medical History:   has a past medical history of Allergic rhinitis, Arthritis, Asthma, CAD (coronary artery disease), CAD (coronary artery disease), Colitis, collagenous, COVID-19, Hepatitis A, History of peptic ulcer, History of pneumonia, Hyperlipidemia, Hypertension, IBS (irritable bowel syndrome), Obstructive chronic bronchitis with acute bronchitis (HCC), and Psoriasis.     Past Surgical History:   has a past surgical history that includes Coronary angioplasty with stent; Colonoscopy (2009); Bunionectomy (Right, 12/12/2013); Hammer  tobacco. She reports that she does not drink alcohol and does not use drugs.     Family History:  family history includes Cancer in her father; Crohn's Disease in her sister; Lung Cancer in her brother; No Known Problems in her mother.     Physical Exam:  BP (!) 201/81   Pulse 81   Temp 97.5 °F (36.4 °C)   Resp 18   Ht 1.6 m (5' 3\")   Wt 69.4 kg (153 lb)   SpO2 100%   BMI 27.10 kg/m²     General appearance: No apparent distress appears stated age and cooperative.  HEENT Normal cephalic, atraumatic.  PERRLA.  Nasal balloon in left nostril  Neck: Supple, No jugular venous distention/bruits.  Trachea midline without thyromegaly   Lungs: Clear to auscultation, bilaterally without Rales/Wheezes/Rhonchi   Heart: Regular rate and rhythm with Normal S1/S2 without murmurs  Abdomen: Soft, non-tender, non-distended. Positive bowel sounds all four quadrants.  Extremities: No clubbing, cyanosis, or edema bilaterally.   Neurologic: CN 2-12 grossly intact. No speech or motor deficits  Psychiatry: Appropriate affect. Not agitated  Skin: Warm, dry, normal turgor, no rash  Brisk capillary refill, peripheral pulses palpable     Labs:  CBC:   Lab Results   Component Value Date/Time    WBC 11.9 09/01/2024 10:19 AM    RBC 4.14 09/01/2024 10:19 AM    HGB 12.2 09/01/2024 10:19 AM    HCT 38.1 09/01/2024 10:19 AM    MCV 91.9 09/01/2024 10:19 AM    MCH 29.5 09/01/2024 10:19 AM    MCHC 32.1 09/01/2024 10:19 AM    RDW 15.2 09/01/2024 10:19 AM     09/01/2024 10:19 AM    MPV 8.1 09/01/2024 10:19 AM     BMP:    Lab Results   Component Value Date/Time     09/01/2024 07:25 AM    K 3.6 09/01/2024 07:25 AM     09/01/2024 07:25 AM    CO2 27 09/01/2024 07:25 AM    BUN 22 09/01/2024 07:25 AM    CREATININE 1.0 09/01/2024 07:25 AM    CALCIUM 9.6 09/01/2024 07:25 AM    GFRAA 59 07/04/2019 05:32 AM    GFRAA 53 07/17/2012 10:40 AM    LABGLOM 57 09/01/2024 07:25 AM    GLUCOSE 112 09/01/2024 07:25 AM     XR CHEST PORTABLE   Final

## 2024-09-01 NOTE — ED PROVIDER NOTES
[facility] EMERGENCY DEPARTMENT  EMERGENCY DEPARTMENT ENCOUNTER        Pt Name: Raquel Dos Santos  MRN: [id number]  Birthdate 1945  Date of evaluation: 9/1/2024  Provider: Valerie Wang PA-C  PCP: No primary care provider on file.  Note Started: 10:22 AM EDT 9/1/24      MARTY. I have evaluated this patient.        CHIEF COMPLAINT       Chief Complaint   Patient presents with    Epistaxis     Pt arrives via private transport as a transfer from [facility] to [facility] for ENT services. Currently has rhino rocket in place.        HISTORY OF PRESENT ILLNESS: 1 or more Elements     History from : Patient    Limitations to history : None    Raquel Dos Santos is a 78 y.o. female who  has a past medical history of Allergic rhinitis, Arthritis, Asthma, CAD (coronary artery disease), CAD (coronary artery disease), Colitis, collagenous, COVID-19, Hepatitis A, History of peptic ulcer, History of pneumonia, Hyperlipidemia, Hypertension, IBS (irritable bowel syndrome), Obstructive chronic bronchitis with acute bronchitis (HCC), and Psoriasis.   who presents to the ED with a left sided nose bleed since 5 am this morning. She is not on any blood thinners other than aspirin.  She has had a history of nosebleeds in the past.  However, none for the past 2 years.  She is an ex-smoker with history of COPD. She was diagnosed with pneumonia in June.  She went to [facility] ED and they tried several methods of controlling the epistaxis that did eventually start to come out of the right nostril. Ultimately a 5.5 cm left nasal balloon was placed in the left side and it has slowed down. It no longer is coming out of the right nostril. She feels it dripping down the back of her throat. She is slightly hypoxic on room air, therefore, the doctor at [facility] did order some labs. They did consult with ENT specialist, Dr Gaxiola, who advised that the patient be transferred to [facility] for ENT services. She  treatment  Universal protocol:     Procedure explained and questions answered to patient or proxy's satisfaction: yes      Patient identity confirmed:  Verbally with patient  Anesthesia:     Anesthesia method:  None  Procedure details:     Treatment site:  Unable to specify    Treatment complexity:  Extensive    Treatment episode: initial    Post-procedure details:     Assessment:  Bleeding decreased    Procedure completion:  Tolerated  Comments:      Previous ED attending placed a 5.5 cm nasal balloon in left nostril. Per ENT instruction, I placed additional 2 cc of air into the existing nasal balloon.      CRITICAL CARE TIME (.cctime)   There was a high probability of life-threatening clinical deterioration in the patient's condition requiring my urgent intervention.  I personally saw the patient and independently provided 40 minutes of non-concurrent critical care out of the total shared critical care time provided, excluding separately reportable procedures.         PAST MEDICAL HISTORY         Chronic Conditions affecting Care:  has a past medical history of Allergic rhinitis, Arthritis, Asthma, CAD (coronary artery disease) (6/7/2010), CAD (coronary artery disease), Colitis, collagenous, COVID-19 (12/27/2021), Hepatitis A, History of peptic ulcer, History of pneumonia, Hyperlipidemia, Hypertension, IBS (irritable bowel syndrome) (6/7/2010), Obstructive chronic bronchitis with acute bronchitis (HCC) (4/1/2019), and Psoriasis.     EMERGENCY DEPARTMENT COURSE and DIFFERENTIAL DIAGNOSIS/MDM:   Vitals:    Vitals:    09/01/24 0950 09/01/24 1032   BP: (!) 191/79 (!) 209/68   Pulse: 81    Resp: 18    Temp: 97.5 °F (36.4 °C)    SpO2: (!) 89% 94%   Weight: 69.4 kg (153 lb)    Height: 1.6 m (5' 3\")        Patient was given the following medications:  Medications   ipratropium 0.5 mg-albuterol 2.5 mg (DUONEB) nebulizer solution 1 Dose (has no administration in time range)   HYDROmorphone HCl PF (DILAUDID) injection 1 mg  (has no administration in time range)   ceFEPIme (MAXIPIME) 2,000 mg in sodium chloride 0.9 % 100 mL IVPB (mini-bag) (has no administration in time range)   vancomycin (VANCOCIN) 1,000 mg in sodium chloride 0.9 % 250 mL IVPB ([id number]) (has no administration in time range)             Is this patient to be included in the SEP-1 Core Measure due to severe sepsis or septic shock?   No   Exclusion criteria - the patient is NOT to be included for SEP-1 Core Measure due to:  May have criteria for sepsis, but does not meet criteria for severe sepsis or septic shock    CONSULTS: (Who and What was discussed)  IP CONSULT TO OTOLARYNGOLOGY  IP CONSULT TO HOSPITALIST  Discussion with Other Profesionals : Admitting Team hospitalist paged for admission 1048 and Consultant I spoke with Dr. Gaxiola, ENT specialist, at 1038. He recommends placing 2cc or more air into the nasal balloon that is in the left nostril at this time. He will come to ED around noon to reassess and determine whether patient needs posterior packing.    Social Determinants : None    Records Reviewed : Outpatient Notes 7/8/24 pcp office visit and Other [facility] ED note today    CC/HPI Summary, DDx, ED Course, and Reassessment: This patient presents with epistaxis.  She currently has a nasal packing but is still bleeding around the packing.  I did insert 2 cc of air into the packing.  This did help decrease the bleeding.  I did give her Dilaudid for pain.  She was hypoxic on arrival.  She has a subacute cough, history of COPD and a recent pneumonia diagnosis several months ago.  She tells me that it was double pneumonia.  However the chest x-ray today reveals right lower lobe pneumonia.  We are giving her supplemental oxygen through her mouth.  I did order breathing treatment for wheezing on exam.  I did consult with ENT specialist who will manage epistaxis further.  Patient and family understand and agree with plan for admission.    My suspicion is low for

## 2024-09-01 NOTE — ED NOTES
Pt sitting in bed, states packing in nares is uncomfortable. Dr Gamble at bedside. No bleeding noted from anterior nares. Pt reports no further bleeding down back of throat.

## 2024-09-01 NOTE — ACP (ADVANCE CARE PLANNING)
Advance Care Planning Note       Purpose of Encounter: Advanced care planning in light of hospitalization for epistaxis  Parties in attendance: :Raquel Dos Santos, DARIEL BAILEY MD   Decisional Capacity:Yes  Objective: This 78 y.o. female  with PMHx of hypertension, hyperlipidemia, asthma, CAD, collagenous colitis, IBS, COVID-19 and obstructive chronic bronchitis who presented with epistaxis   Goals of Care Determinations: Pt wants measures including CPR, intubation, trach, PEG tube, dialysis   Code Status: Full  Time Spent on Advanced Planning Documents: 16 mins.  Advanced Care Planning Documents:  Completed advances directives, advanced directives in chart.      Electronically signed by DARIEL BAILEY MD on 9/1/2024 at 1:46 PM

## 2024-09-01 NOTE — ED NOTES
CMT here at this time to transport patient to [facility] ED. All patient belongings and paperwork sent with patient.

## 2024-09-01 NOTE — ED PROVIDER NOTES
Patient signed out to me.     78yoF here with epistaxis out of the left nostril. Dr. Gamble evaluated the patient. She received lidocaine with epinephrine and posterior packing. She then started bleeding again and had blood draining down her posterior pharynx as well as slight oozing down the right nostril.     She is now wheezing. She is a heavy smoker. He ordered IV, blood work and breathing treatment. She will likely not tolerate bilateral packing well given her breathing at this time though her vitals are stable. Her blood pressure was initially around 140s systolic but increased after having the packing placed.     I perfectserved Dr. Broderick Gaxiola from ENT. He requests that we send the patient to the [facility] ED for evaluation and possible admission. Patient and her  agreeable with this plan.     I have spoken with Dr. House from the [facility] ED who accepts this patient for transfer and transfer center will work on ambulance transfer    Final Impression  Epistaxis     Harmony Birmingham MD  09/01/24 0728    
disease.  She did get admitted to the hospital with pneumonia back in June of this year and was admitted for 4 days.  She smoked for about 30 years and I do suspect she has some underlying COPD.      Is this patient to be included in the SEP-1 Core Measure?  No     Exclusion criteria - the patient is NOT to be included for SEP-1 Core Measure due to:  Infection is not suspected    I, Rory Gamble MD, am the primary clinician of record.     During the patient's ED course, the patient was given:  Medications   oxymetazoline (AFRIN) 0.05 % nasal spray 2 spray (has no administration in time range)   tranexamic acid (CYKLOKAPRON) injection 100 mg (has no administration in time range)   ipratropium 0.5 mg-albuterol 2.5 mg (DUONEB) nebulizer solution 1 Dose (has no administration in time range)   lidocaine-EPINEPHrine 1 percent-[id number] injection 10 mL (10 mLs IntraDERmal Given 9/1/24 0626)        Patient was given prescriptions for the following medications. I counseled the patient as to how to take these medications.  New Prescriptions    No medications on file       Patient instructed to follow up with:   No follow-up provider specified.    All questions were answered and the patient/family expressed understanding and agreement with the plan.     CRITICAL CARE  N/A    CLINICAL IMPRESSION  1. Epistaxis        DISPOSITION   09/01/2024 07:16:25 AM     Rory Gamble MD    Note: This chart was created using voice recognition dictation software. Efforts were made by me to ensure accuracy, however some errors may be present due to limitations of this technology and occasionally words are not transcribed correctly.        Rory Gamble MD  09/01/24 0724

## 2024-09-02 LAB
ANION GAP SERPL CALCULATED.3IONS-SCNC: 12 MMOL/L (ref 3–16)
BASOPHILS # BLD: 0.1 K/UL (ref 0–0.2)
BASOPHILS NFR BLD: 0.6 %
BUN SERPL-MCNC: 17 MG/DL (ref 7–20)
CALCIUM SERPL-MCNC: 9 MG/DL (ref 8.3–10.6)
CHLORIDE SERPL-SCNC: 109 MMOL/L (ref 99–110)
CO2 SERPL-SCNC: 19 MMOL/L (ref 21–32)
CREAT SERPL-MCNC: 0.8 MG/DL (ref 0.6–1.2)
DEPRECATED RDW RBC AUTO: 15.4 % (ref 12.4–15.4)
EOSINOPHIL # BLD: 0.1 K/UL (ref 0–0.6)
EOSINOPHIL NFR BLD: 0.7 %
GFR SERPLBLD CREATININE-BSD FMLA CKD-EPI: 75 ML/MIN/{1.73_M2}
GLUCOSE SERPL-MCNC: 109 MG/DL (ref 70–99)
HCT VFR BLD AUTO: 34.5 % (ref 36–48)
HGB BLD-MCNC: 11.2 G/DL (ref 12–16)
LACTATE BLDV-SCNC: 1.4 MMOL/L (ref 0.4–2)
LACTATE BLDV-SCNC: 1.7 MMOL/L (ref 0.4–2)
LACTATE BLDV-SCNC: 1.8 MMOL/L (ref 0.4–2)
LACTATE BLDV-SCNC: 2.2 MMOL/L (ref 0.4–2)
LYMPHOCYTES # BLD: 1.7 K/UL (ref 1–5.1)
LYMPHOCYTES NFR BLD: 11.4 %
MCH RBC QN AUTO: 29.5 PG (ref 26–34)
MCHC RBC AUTO-ENTMCNC: 32.6 G/DL (ref 31–36)
MCV RBC AUTO: 90.7 FL (ref 80–100)
MONOCYTES # BLD: 0.8 K/UL (ref 0–1.3)
MONOCYTES NFR BLD: 5.2 %
NEUTROPHILS # BLD: 12.2 K/UL (ref 1.7–7.7)
NEUTROPHILS NFR BLD: 82.1 %
PLATELET # BLD AUTO: 319 K/UL (ref 135–450)
PMV BLD AUTO: 7.8 FL (ref 5–10.5)
POTASSIUM SERPL-SCNC: 3.8 MMOL/L (ref 3.5–5.1)
RBC # BLD AUTO: 3.8 M/UL (ref 4–5.2)
SODIUM SERPL-SCNC: 140 MMOL/L (ref 136–145)
WBC # BLD AUTO: 14.8 K/UL (ref 4–11)

## 2024-09-02 PROCEDURE — 94761 N-INVAS EAR/PLS OXIMETRY MLT: CPT

## 2024-09-02 PROCEDURE — 6360000002 HC RX W HCPCS: Performed by: INTERNAL MEDICINE

## 2024-09-02 PROCEDURE — 85025 COMPLETE CBC W/AUTO DIFF WBC: CPT

## 2024-09-02 PROCEDURE — 83605 ASSAY OF LACTIC ACID: CPT

## 2024-09-02 PROCEDURE — 2580000003 HC RX 258: Performed by: PHYSICIAN ASSISTANT

## 2024-09-02 PROCEDURE — 94640 AIRWAY INHALATION TREATMENT: CPT

## 2024-09-02 PROCEDURE — 6370000000 HC RX 637 (ALT 250 FOR IP): Performed by: NURSE PRACTITIONER

## 2024-09-02 PROCEDURE — 6360000002 HC RX W HCPCS: Performed by: PHYSICIAN ASSISTANT

## 2024-09-02 PROCEDURE — 2060000000 HC ICU INTERMEDIATE R&B

## 2024-09-02 PROCEDURE — 2580000003 HC RX 258: Performed by: INTERNAL MEDICINE

## 2024-09-02 PROCEDURE — 80048 BASIC METABOLIC PNL TOTAL CA: CPT

## 2024-09-02 PROCEDURE — 6370000000 HC RX 637 (ALT 250 FOR IP): Performed by: INTERNAL MEDICINE

## 2024-09-02 PROCEDURE — 36415 COLL VENOUS BLD VENIPUNCTURE: CPT

## 2024-09-02 RX ORDER — OXYCODONE HYDROCHLORIDE 5 MG/1
5 TABLET ORAL EVERY 8 HOURS PRN
Status: DISCONTINUED | OUTPATIENT
Start: 2024-09-02 | End: 2024-09-04 | Stop reason: HOSPADM

## 2024-09-02 RX ORDER — LEVOFLOXACIN 5 MG/ML
750 INJECTION, SOLUTION INTRAVENOUS EVERY 24 HOURS
Status: DISCONTINUED | OUTPATIENT
Start: 2024-09-02 | End: 2024-09-03

## 2024-09-02 RX ADMIN — SODIUM CHLORIDE, PRESERVATIVE FREE 10 ML: 5 INJECTION INTRAVENOUS at 08:21

## 2024-09-02 RX ADMIN — LEVOFLOXACIN 750 MG: 5 INJECTION, SOLUTION INTRAVENOUS at 11:00

## 2024-09-02 RX ADMIN — ATENOLOL 25 MG: 50 TABLET ORAL at 08:21

## 2024-09-02 RX ADMIN — CEFEPIME 2000 MG: 2 INJECTION, POWDER, FOR SOLUTION INTRAVENOUS at 08:30

## 2024-09-02 RX ADMIN — IPRATROPIUM BROMIDE AND ALBUTEROL SULFATE 1 DOSE: .5; 3 SOLUTION RESPIRATORY (INHALATION) at 19:39

## 2024-09-02 RX ADMIN — IPRATROPIUM BROMIDE AND ALBUTEROL SULFATE 1 DOSE: .5; 3 SOLUTION RESPIRATORY (INHALATION) at 09:08

## 2024-09-02 RX ADMIN — MELATONIN TAB 3 MG 3 MG: 3 TAB at 23:09

## 2024-09-02 RX ADMIN — ATORVASTATIN CALCIUM 40 MG: 80 TABLET, FILM COATED ORAL at 21:29

## 2024-09-02 RX ADMIN — CEFEPIME 2000 MG: 2 INJECTION, POWDER, FOR SOLUTION INTRAVENOUS at 23:14

## 2024-09-02 RX ADMIN — SODIUM CHLORIDE, PRESERVATIVE FREE 10 ML: 5 INJECTION INTRAVENOUS at 21:30

## 2024-09-02 RX ADMIN — OXYCODONE HYDROCHLORIDE 5 MG: 5 TABLET ORAL at 23:09

## 2024-09-02 RX ADMIN — LOSARTAN POTASSIUM 50 MG: 25 TABLET, FILM COATED ORAL at 08:21

## 2024-09-02 RX ADMIN — OXYCODONE HYDROCHLORIDE 5 MG: 5 TABLET ORAL at 15:18

## 2024-09-02 ASSESSMENT — PAIN DESCRIPTION - ORIENTATION
ORIENTATION: LEFT

## 2024-09-02 ASSESSMENT — PAIN SCALES - GENERAL
PAINLEVEL_OUTOF10: 0
PAINLEVEL_OUTOF10: 7
PAINLEVEL_OUTOF10: 7
PAINLEVEL_OUTOF10: 4
PAINLEVEL_OUTOF10: 0

## 2024-09-02 ASSESSMENT — PAIN SCALES - WONG BAKER: WONGBAKER_NUMERICALRESPONSE: NO HURT

## 2024-09-02 ASSESSMENT — PAIN DESCRIPTION - LOCATION
LOCATION: HEAD
LOCATION: HEAD;FACE
LOCATION: FACE

## 2024-09-02 ASSESSMENT — PAIN DESCRIPTION - DESCRIPTORS
DESCRIPTORS: ACHING

## 2024-09-02 NOTE — PROGRESS NOTES
HOSPITALISTS PROGRESS NOTE    9/2/2024 8:48 AM        Name: Raquel Dos Santos .              Admitted: 9/1/2024  Primary Care Provider: No primary care provider on file. (Tel: None)      Brief Course: This 78 y.o. female  with PMHx of hypertension, hyperlipidemia, asthma, CAD, collagenous colitis, IBS, COVID-19 and obstructive chronic bronchitis who presented epistaxis.     Interval history:   Pt seen and examined today   Overnight events noted and interval ancillary notes reviewed.  On RA satting well.  Afebrile overnight, WBCs trended up to 14.8 K, blood cultures NGTD.  Hgb 11.2 this morning  Pt up in bed; reported that he is very uncomfortable with nasal packing in place; no emerald bleeding but she gets watery discharge when wipes her nose with a tissue.    Reported she is feeling a little hungry and wants to eat as her vomiting has resolved      Assessment & Plan:     Epistaxis; nasal pack in place  ENT consulted; rapid Rhino balloon Epistat removed and anterior-posterior nasal pack placed left nasal cavity     RLL pneumonia; likely aspiration?  Afebrile with WBCs 11.9 K on admission.  CXR with right lower lobe pneumonia.  Continue Levaquin  Check Pro-Montrell, MRSA nares, strep pneumo and Legionella antigen    Lactic acidosis; likely secondary dehydration/pneumonia and continue IV fluids    Hypertension; uncontrolled.  Continue atenolol.  IV hydralazine added  Monitor BP closely.  Will adjust medication dose if needed     Hyperlipidemia; continue statins.     Asthma, CAD, collagenous colitis, IBS, COVID-19 and obstructive chronic bronchitis       DVT PPX: SCDs  Code:Full Code    Disposition: Once acute medical issues have resolved    Current Medications  ceFEPIme (MAXIPIME) 2,000 mg in sodium chloride 0.9 % 100 mL IVPB (mini-bag), Q12H  atenolol (TENORMIN) tablet 25 mg, Daily  atorvastatin (LIPITOR) tablet 40 mg, Nightly  losartan (COZAAR) tablet 50 mg,

## 2024-09-03 ENCOUNTER — TELEPHONE (OUTPATIENT)
Dept: ENT CLINIC | Age: 79
End: 2024-09-03

## 2024-09-03 LAB
ANION GAP SERPL CALCULATED.3IONS-SCNC: 12 MMOL/L (ref 3–16)
BASOPHILS # BLD: 0 K/UL (ref 0–0.2)
BASOPHILS NFR BLD: 0.4 %
BETA-HYDROXYBUTYRATE: 0.1 MMOL/L (ref 0–0.27)
BUN SERPL-MCNC: 14 MG/DL (ref 7–20)
CALCIUM SERPL-MCNC: 8.7 MG/DL (ref 8.3–10.6)
CHLORIDE SERPL-SCNC: 107 MMOL/L (ref 99–110)
CO2 SERPL-SCNC: 21 MMOL/L (ref 21–32)
CREAT SERPL-MCNC: 0.9 MG/DL (ref 0.6–1.2)
DEPRECATED RDW RBC AUTO: 15.1 % (ref 12.4–15.4)
EOSINOPHIL # BLD: 0.3 K/UL (ref 0–0.6)
EOSINOPHIL NFR BLD: 2.7 %
GFR SERPLBLD CREATININE-BSD FMLA CKD-EPI: 65 ML/MIN/{1.73_M2}
GLUCOSE SERPL-MCNC: 98 MG/DL (ref 70–99)
HCT VFR BLD AUTO: 30.4 % (ref 36–48)
HGB BLD-MCNC: 10.2 G/DL (ref 12–16)
LACTATE BLDV-SCNC: 2.1 MMOL/L (ref 0.4–2)
LACTATE BLDV-SCNC: 2.8 MMOL/L (ref 0.4–2)
LYMPHOCYTES # BLD: 1.3 K/UL (ref 1–5.1)
LYMPHOCYTES NFR BLD: 12.1 %
MAGNESIUM SERPL-MCNC: 1.7 MG/DL (ref 1.8–2.4)
MCH RBC QN AUTO: 30.5 PG (ref 26–34)
MCHC RBC AUTO-ENTMCNC: 33.6 G/DL (ref 31–36)
MCV RBC AUTO: 90.9 FL (ref 80–100)
MONOCYTES # BLD: 0.8 K/UL (ref 0–1.3)
MONOCYTES NFR BLD: 7.5 %
NEUTROPHILS # BLD: 8.1 K/UL (ref 1.7–7.7)
NEUTROPHILS NFR BLD: 77.3 %
PLATELET # BLD AUTO: 251 K/UL (ref 135–450)
PMV BLD AUTO: 7.9 FL (ref 5–10.5)
POTASSIUM SERPL-SCNC: 3.5 MMOL/L (ref 3.5–5.1)
RBC # BLD AUTO: 3.35 M/UL (ref 4–5.2)
SODIUM SERPL-SCNC: 140 MMOL/L (ref 136–145)
WBC # BLD AUTO: 10.5 K/UL (ref 4–11)

## 2024-09-03 PROCEDURE — 97116 GAIT TRAINING THERAPY: CPT

## 2024-09-03 PROCEDURE — 83735 ASSAY OF MAGNESIUM: CPT

## 2024-09-03 PROCEDURE — 36415 COLL VENOUS BLD VENIPUNCTURE: CPT

## 2024-09-03 PROCEDURE — 80048 BASIC METABOLIC PNL TOTAL CA: CPT

## 2024-09-03 PROCEDURE — 2580000003 HC RX 258: Performed by: INTERNAL MEDICINE

## 2024-09-03 PROCEDURE — 6370000000 HC RX 637 (ALT 250 FOR IP): Performed by: INTERNAL MEDICINE

## 2024-09-03 PROCEDURE — 97161 PT EVAL LOW COMPLEX 20 MIN: CPT

## 2024-09-03 PROCEDURE — 6360000002 HC RX W HCPCS: Performed by: INTERNAL MEDICINE

## 2024-09-03 PROCEDURE — 97530 THERAPEUTIC ACTIVITIES: CPT

## 2024-09-03 PROCEDURE — 97165 OT EVAL LOW COMPLEX 30 MIN: CPT

## 2024-09-03 PROCEDURE — 94761 N-INVAS EAR/PLS OXIMETRY MLT: CPT

## 2024-09-03 PROCEDURE — 2060000000 HC ICU INTERMEDIATE R&B

## 2024-09-03 PROCEDURE — 94640 AIRWAY INHALATION TREATMENT: CPT

## 2024-09-03 PROCEDURE — 83605 ASSAY OF LACTIC ACID: CPT

## 2024-09-03 PROCEDURE — 85025 COMPLETE CBC W/AUTO DIFF WBC: CPT

## 2024-09-03 PROCEDURE — 82010 KETONE BODYS QUAN: CPT

## 2024-09-03 PROCEDURE — 6370000000 HC RX 637 (ALT 250 FOR IP): Performed by: NURSE PRACTITIONER

## 2024-09-03 RX ORDER — MICONAZOLE NITRATE 2 %
1 CREAM WITH APPLICATOR VAGINAL NIGHTLY
Status: DISCONTINUED | OUTPATIENT
Start: 2024-09-03 | End: 2024-09-04 | Stop reason: HOSPADM

## 2024-09-03 RX ORDER — MAGNESIUM SULFATE 1 G/100ML
1000 INJECTION INTRAVENOUS ONCE
Status: COMPLETED | OUTPATIENT
Start: 2024-09-03 | End: 2024-09-03

## 2024-09-03 RX ORDER — LEVOFLOXACIN 500 MG/1
750 TABLET, FILM COATED ORAL
Status: DISCONTINUED | OUTPATIENT
Start: 2024-09-04 | End: 2024-09-04

## 2024-09-03 RX ORDER — SODIUM CHLORIDE 9 MG/ML
INJECTION, SOLUTION INTRAVENOUS CONTINUOUS
Status: ACTIVE | OUTPATIENT
Start: 2024-09-03 | End: 2024-09-03

## 2024-09-03 RX ADMIN — SODIUM CHLORIDE: 9 INJECTION, SOLUTION INTRAVENOUS at 10:09

## 2024-09-03 RX ADMIN — HYDRALAZINE HYDROCHLORIDE 10 MG: 20 INJECTION INTRAMUSCULAR; INTRAVENOUS at 19:35

## 2024-09-03 RX ADMIN — MAGNESIUM SULFATE HEPTAHYDRATE 1000 MG: 1 INJECTION, SOLUTION INTRAVENOUS at 10:12

## 2024-09-03 RX ADMIN — MELATONIN TAB 3 MG 3 MG: 3 TAB at 22:23

## 2024-09-03 RX ADMIN — MICONAZOLE NITRATE 1 APPLICATOR: 20 CREAM VAGINAL at 19:40

## 2024-09-03 RX ADMIN — ATENOLOL 25 MG: 50 TABLET ORAL at 08:24

## 2024-09-03 RX ADMIN — IPRATROPIUM BROMIDE AND ALBUTEROL SULFATE 1 DOSE: .5; 3 SOLUTION RESPIRATORY (INHALATION) at 21:47

## 2024-09-03 RX ADMIN — LOSARTAN POTASSIUM 50 MG: 25 TABLET, FILM COATED ORAL at 08:24

## 2024-09-03 RX ADMIN — SODIUM CHLORIDE, PRESERVATIVE FREE 10 ML: 5 INJECTION INTRAVENOUS at 20:55

## 2024-09-03 RX ADMIN — OXYCODONE HYDROCHLORIDE 5 MG: 5 TABLET ORAL at 22:22

## 2024-09-03 RX ADMIN — SODIUM CHLORIDE, PRESERVATIVE FREE 10 ML: 5 INJECTION INTRAVENOUS at 08:28

## 2024-09-03 RX ADMIN — ATORVASTATIN CALCIUM 40 MG: 80 TABLET, FILM COATED ORAL at 20:55

## 2024-09-03 RX ADMIN — IPRATROPIUM BROMIDE AND ALBUTEROL SULFATE 1 DOSE: .5; 3 SOLUTION RESPIRATORY (INHALATION) at 09:21

## 2024-09-03 ASSESSMENT — PAIN SCALES - GENERAL
PAINLEVEL_OUTOF10: 2
PAINLEVEL_OUTOF10: 3
PAINLEVEL_OUTOF10: 7
PAINLEVEL_OUTOF10: 0
PAINLEVEL_OUTOF10: 3

## 2024-09-03 ASSESSMENT — PAIN DESCRIPTION - DESCRIPTORS: DESCRIPTORS: ACHING;SORE

## 2024-09-03 ASSESSMENT — PAIN DESCRIPTION - LOCATION: LOCATION: HEAD;THROAT

## 2024-09-03 ASSESSMENT — PAIN - FUNCTIONAL ASSESSMENT: PAIN_FUNCTIONAL_ASSESSMENT: ACTIVITIES ARE NOT PREVENTED

## 2024-09-03 ASSESSMENT — PAIN DESCRIPTION - ORIENTATION: ORIENTATION: LEFT

## 2024-09-03 NOTE — TELEPHONE ENCOUNTER
Patient is admitted to the hospital as an inpatient.  I saw her for consultation.  I spoke to the patient's nurse.  I advised patient's nurse that I have scheduled Mrs. Dos Santos for an appointment tomorrow in my office at 2 PM for removal of the nasal packs.  Nurse informing that she may not be discharged today due to her lactic acid being high and therefore may still be in the hospital tomorrow.  If so I will see her as an inpatient tomorrow and remove the nasal packing.

## 2024-09-03 NOTE — PROGRESS NOTES
Minimal drainage from left nostril. Medicated for pain at bedtime. No further complaints. Slept well.

## 2024-09-03 NOTE — TELEPHONE ENCOUNTER
Per nurse unable to discharge until follow up appt is made in the office.   Please advise when you would like to see her this week.   Thank you

## 2024-09-03 NOTE — TELEPHONE ENCOUNTER
Triage nurse called regarding pt, she was seen in the ER for nosebleeds and they would like for her to be seen tomorrow if possible. Her called back is [phone] (Chaitanya)

## 2024-09-03 NOTE — PROGRESS NOTES
HOSPITALISTS PROGRESS NOTE    9/3/2024 8:50 AM        Name: Raquel Dos Santos .              Admitted: 9/1/2024  Primary Care Provider: No primary care provider on file. (Tel: None)      Brief Course: This 78 y.o. female  with PMHx of hypertension, hyperlipidemia, asthma, CAD, collagenous colitis, IBS, COVID-19 and obstructive chronic bronchitis who presented epistaxis.     Interval history:   Pt seen and examined today.  Overnight events noted, interval ancillary notes and labs reviewed.   On RA satting well. Elevated BP this morning; instructed RN to give BP medicine recheck blood pressure  Afebrile overnight, WBCs trended down, blood cultures NGTD  Low mag this morning; replacement ordered.  Check mag level after replacement  Reported that clear nasal discharge from left nostril resolved.  Denies any fever, chills, SOB, nausea, hemoptysis, vomiting, hematemesis or melena        Assessment & Plan:     Epistaxis; nasal pack in place  ENT consulted; rapid Rhino balloon Epistat removed and anterior-posterior nasal pack placed left nasal cavity     RLL pneumonia; likely aspiration?  Afebrile with WBCs 11.9 K on admission.  CXR with right lower lobe pneumonia.  Continue Levaquin  Check Pro-Montrell, MRSA nares, strep pneumo and Legionella antigen    Lactic acidosis; likely secondary dehydration/pneumonia and continue IV fluids  Lactic acid initially resolved; trended up again.  Continue IV fluids check beta hydroxybutyrate    Hypertension; uncontrolled.  Continue atenolol.  IV hydralazine added  Monitor BP closely.  Will adjust medication dose if needed     Hyperlipidemia; continue statins.     Asthma, CAD, collagenous colitis, IBS, COVID-19 and obstructive chronic bronchitis       DVT PPX: SCDs  Code:Full Code    Disposition: Once acute medical issues have resolved    Current Medications  levoFLOXacin (LEVAQUIN) 750 MG/150ML infusion 750 mg, Q24H  oxyCODONE

## 2024-09-03 NOTE — PROGRESS NOTES
[facility] - Inpatient Rehabilitation Department   Phone: [phone]    Occupational Therapy    [x] Initial Evaluation            [] Daily Treatment Note         [x] Discharge Summary      Patient: Raquel Dos Santos   : 1945   MRN: [id number]   Date of Service:  9/3/2024    Admitting Diagnosis:  Epistaxis  Current Admission Summary: Per ED note, \"Raquel Dos Santos is a 78 y.o. female who  has a past medical history of Allergic rhinitis, Arthritis, Asthma, CAD (coronary artery disease), CAD (coronary artery disease), Colitis, collagenous, COVID-19, Hepatitis A, History of peptic ulcer, History of pneumonia, Hyperlipidemia, Hypertension, IBS (irritable bowel syndrome), Obstructive chronic bronchitis with acute bronchitis (HCC), and Psoriasis.   who presents to the ED with a nosebleed.  Patient woke up with that this morning.  She is not on any blood thinners other than aspirin.  She has had a history of nosebleeds in the past.  However, none for the past 2 years.  She is an ex-smoker.  She does have a history of coronary artery disease.  She could not tell me what side the nosebleed started from because she just woke up with it.  However while here we were able to determine that it seemed to be coming from the left.\"  Past Medical History:  has a past medical history of Allergic rhinitis, Arthritis, Asthma, CAD (coronary artery disease), CAD (coronary artery disease), Colitis, collagenous, COVID-19, Hepatitis A, History of peptic ulcer, History of pneumonia, Hyperlipidemia, Hypertension, IBS (irritable bowel syndrome), Obstructive chronic bronchitis with acute bronchitis (HCC), and Psoriasis.  Past Surgical History:  has a past surgical history that includes Coronary angioplasty with stent; Colonoscopy (); Bunionectomy (Right, 2013); Hammer toe surgery (Right, 2013); Colonoscopy (N/A, 3/20/2019); bronchoscopy (N/A, 2019); bronchoscopy (2019); eye surgery (Bilateral, ); and back  numbness and tingling  Proprioception:    WFL  Tone:   Normotonic  Coordination Testing:   WFL    ROM:   (B) UE AROM WFL  Strength:   (B) UE strength grossly WFL    Therapist Clinical Decision Making (Complexity): low complexity  Clinical Presentation: stable      Subjective  General: Pt stated that her nosebleed is better. \"It's barely seeping.\" Pt wants go home.  Pain: 0/10  Pain Interventions: not applicable        Activities of Daily Living  Basic Activities of Daily Living  Lower Extremity Dressing: Independent  Dressing Comments: socks  Toileting Comments: Pt reports using the toilet independently.  Instrumental Activities of Daily Living  No IADL completed on this date.    Functional Mobility  Bed Mobility:  Supine to Sit: Independent, flat bed  Scooting: Independent  Comments: Used bed rail to scoot  Transfers:  Sit to stand transfer:modified independent  Stand to sit transfer: Independent  Toilet transfer: Independent  Toilet transfer equipment: standard toilet  Comments:  Functional Mobility  Functional Mobility Activity: to/from bathroom, ~200 ft in barrios  Device Use: no device  Required Assistance: Independent  Comment: No LOB  Balance:  Static Sitting Balance: good(+): independent with high level dynamic balance in unsupported position  Dynamic Sitting Balance: good(+): independent with high level dynamic balance in unsupported position  Static Standing Balance: good(+): independent with high level dynamic balance in unsupported position  Dynamic Standing Balance: good(+): independent with high level dynamic balance in unsupported position  Comments:    Other Therapeutic Interventions    Functional Outcomes  AM-PAC Inpatient Daily Activity Raw Score: 24                                    Cognition  WFL  Orientation:    alert and oriented x 4  Command Following:   WFL     Education  Barriers To Learning: none  Patient Education: patient educated on OT role and benefits, discharge recommendations  Learning

## 2024-09-04 VITALS
HEART RATE: 67 BPM | OXYGEN SATURATION: 92 % | BODY MASS INDEX: 27.62 KG/M2 | SYSTOLIC BLOOD PRESSURE: 175 MMHG | TEMPERATURE: 98.4 F | RESPIRATION RATE: 16 BRPM | HEIGHT: 63 IN | WEIGHT: 155.9 LBS | DIASTOLIC BLOOD PRESSURE: 69 MMHG

## 2024-09-04 LAB
ANION GAP SERPL CALCULATED.3IONS-SCNC: 11 MMOL/L (ref 3–16)
BASOPHILS # BLD: 0.1 K/UL (ref 0–0.2)
BASOPHILS NFR BLD: 0.6 %
BUN SERPL-MCNC: 10 MG/DL (ref 7–20)
CALCIUM SERPL-MCNC: 8.8 MG/DL (ref 8.3–10.6)
CHLORIDE SERPL-SCNC: 108 MMOL/L (ref 99–110)
CO2 SERPL-SCNC: 22 MMOL/L (ref 21–32)
CREAT SERPL-MCNC: 0.7 MG/DL (ref 0.6–1.2)
DEPRECATED RDW RBC AUTO: 15.2 % (ref 12.4–15.4)
EOSINOPHIL # BLD: 0.3 K/UL (ref 0–0.6)
EOSINOPHIL NFR BLD: 3.2 %
GFR SERPLBLD CREATININE-BSD FMLA CKD-EPI: 87 ML/MIN/{1.73_M2}
GLUCOSE SERPL-MCNC: 98 MG/DL (ref 70–99)
HCT VFR BLD AUTO: 30.8 % (ref 36–48)
HGB BLD-MCNC: 10.1 G/DL (ref 12–16)
LACTATE BLDV-SCNC: 0.9 MMOL/L (ref 0.4–2)
LYMPHOCYTES # BLD: 1.3 K/UL (ref 1–5.1)
LYMPHOCYTES NFR BLD: 13.4 %
MAGNESIUM SERPL-MCNC: 2 MG/DL (ref 1.8–2.4)
MCH RBC QN AUTO: 29.8 PG (ref 26–34)
MCHC RBC AUTO-ENTMCNC: 32.7 G/DL (ref 31–36)
MCV RBC AUTO: 91 FL (ref 80–100)
MONOCYTES # BLD: 0.8 K/UL (ref 0–1.3)
MONOCYTES NFR BLD: 8.4 %
NEUTROPHILS # BLD: 7 K/UL (ref 1.7–7.7)
NEUTROPHILS NFR BLD: 74.4 %
PLATELET # BLD AUTO: 255 K/UL (ref 135–450)
PMV BLD AUTO: 8 FL (ref 5–10.5)
POTASSIUM SERPL-SCNC: 3.2 MMOL/L (ref 3.5–5.1)
RBC # BLD AUTO: 3.39 M/UL (ref 4–5.2)
SODIUM SERPL-SCNC: 141 MMOL/L (ref 136–145)
WBC # BLD AUTO: 9.5 K/UL (ref 4–11)

## 2024-09-04 PROCEDURE — 87449 NOS EACH ORGANISM AG IA: CPT

## 2024-09-04 PROCEDURE — 83605 ASSAY OF LACTIC ACID: CPT

## 2024-09-04 PROCEDURE — 99231 SBSQ HOSP IP/OBS SF/LOW 25: CPT | Performed by: OTOLARYNGOLOGY

## 2024-09-04 PROCEDURE — 6370000000 HC RX 637 (ALT 250 FOR IP): Performed by: NURSE PRACTITIONER

## 2024-09-04 PROCEDURE — 6370000000 HC RX 637 (ALT 250 FOR IP): Performed by: INTERNAL MEDICINE

## 2024-09-04 PROCEDURE — 85025 COMPLETE CBC W/AUTO DIFF WBC: CPT

## 2024-09-04 PROCEDURE — 94761 N-INVAS EAR/PLS OXIMETRY MLT: CPT

## 2024-09-04 PROCEDURE — 2580000003 HC RX 258: Performed by: INTERNAL MEDICINE

## 2024-09-04 PROCEDURE — 36415 COLL VENOUS BLD VENIPUNCTURE: CPT

## 2024-09-04 PROCEDURE — 94640 AIRWAY INHALATION TREATMENT: CPT

## 2024-09-04 PROCEDURE — 80048 BASIC METABOLIC PNL TOTAL CA: CPT

## 2024-09-04 PROCEDURE — 83735 ASSAY OF MAGNESIUM: CPT

## 2024-09-04 RX ORDER — POTASSIUM CHLORIDE 1500 MG/1
40 TABLET, EXTENDED RELEASE ORAL ONCE
Status: COMPLETED | OUTPATIENT
Start: 2024-09-04 | End: 2024-09-04

## 2024-09-04 RX ORDER — LEVOFLOXACIN 750 MG/1
750 TABLET, FILM COATED ORAL DAILY
Qty: 5 TABLET | Refills: 0 | Status: SHIPPED | OUTPATIENT
Start: 2024-09-05 | End: 2024-09-10

## 2024-09-04 RX ADMIN — LOSARTAN POTASSIUM 50 MG: 25 TABLET, FILM COATED ORAL at 08:11

## 2024-09-04 RX ADMIN — LEVOFLOXACIN 750 MG: 500 TABLET, FILM COATED ORAL at 08:11

## 2024-09-04 RX ADMIN — ATENOLOL 25 MG: 50 TABLET ORAL at 08:11

## 2024-09-04 RX ADMIN — IPRATROPIUM BROMIDE AND ALBUTEROL SULFATE 1 DOSE: .5; 3 SOLUTION RESPIRATORY (INHALATION) at 07:49

## 2024-09-04 RX ADMIN — POTASSIUM CHLORIDE 40 MEQ: 1500 TABLET, EXTENDED RELEASE ORAL at 09:47

## 2024-09-04 RX ADMIN — OXYCODONE HYDROCHLORIDE 5 MG: 5 TABLET ORAL at 13:06

## 2024-09-04 RX ADMIN — SODIUM CHLORIDE, PRESERVATIVE FREE 10 ML: 5 INJECTION INTRAVENOUS at 08:13

## 2024-09-04 RX ADMIN — Medication 1 SPRAY: at 03:26

## 2024-09-04 ASSESSMENT — PAIN SCALES - GENERAL
PAINLEVEL_OUTOF10: 8
PAINLEVEL_OUTOF10: 5
PAINLEVEL_OUTOF10: 8
PAINLEVEL_OUTOF10: 8
PAINLEVEL_OUTOF10: 4

## 2024-09-04 ASSESSMENT — PAIN DESCRIPTION - ORIENTATION: ORIENTATION: POSTERIOR

## 2024-09-04 ASSESSMENT — PAIN DESCRIPTION - DESCRIPTORS
DESCRIPTORS: SORE
DESCRIPTORS: SORE
DESCRIPTORS: ACHING;SHARP

## 2024-09-04 ASSESSMENT — PAIN SCALES - WONG BAKER: WONGBAKER_NUMERICALRESPONSE: HURTS LITTLE MORE

## 2024-09-04 ASSESSMENT — PAIN DESCRIPTION - LOCATION
LOCATION: THROAT
LOCATION: THROAT
LOCATION: BACK

## 2024-09-04 NOTE — CARE COORDINATION
09/04/24 1611   IMM Letter   IMM Letter given to Patient/Family/Significant other/Guardian/POA/by: IMM given by CM   IMM Letter date given: 09/04/24   IMM Letter time given: 0518

## 2024-09-04 NOTE — PROGRESS NOTES
Awake and c/o sore throat. Vitals are stable. Afebrile. 167/72, HR 77, RR 20. T 98.1. Started on vaginal cream during the day and tongue is slightly discolored. Reports having had thrush in the past. Will ask hospitalist for something to assist with the pain.

## 2024-09-04 NOTE — DISCHARGE SUMMARY
Hospital Medicine Discharge Summary    Patient ID: Raquel Dos Santos      Patient's PCP: No primary care provider on file.    Admit Date: 9/1/2024     Discharge Date:  9/4/2024    Admitting Physician: Rebecca Bailey MD     Discharge Physician: REBECCA BAILEY MD     Hospital Course: This 78 y.o. female  with PMHx of hypertension, hyperlipidemia, asthma, CAD, collagenous colitis, IBS, COVID-19 and obstructive chronic bronchitis who presented epistaxis.       Epistaxis; resolved  ENT input appreciated; AP Merocel pack  removed prior to discharge. Pt was discharge in stable condition and was instructed to follow up with ENT in 2 weeks      RLL pneumonia; CXR with right lower lobe pneumonia.  Treated with antibiotics     Lactic acidosis; likely 2/2 dehydration/pneumonia.  Resolved      Hypertension; continue current regimen and monitor BP closely     Hyperlipidemia; continue statins.       Physical Exam Performed:     BP (!) 161/67   Pulse 72   Temp 98.2 °F (36.8 °C) (Oral)   Resp 16   Ht 1.6 m (5' 3\")   Wt 70.7 kg (155 lb 14.4 oz)   SpO2 94%   BMI 27.62 kg/m²     General appearance: No apparent distress, appears stated age and cooperative.  Cardiovascular: Regular rhythm, normal S1, S2. No murmur.   Respiratory: Clear to auscultation bilaterally, no wheeze or crackles.   GI: Abdomen soft, no tenderness, not distended, normal bowel sounds  Musculoskeletal:  No cyanosis in digits.  No BLE edema present  Neurology: CN 2-12 grossly intact. No speech or motor deficits    Consults:     IP CONSULT TO OTOLARYNGOLOGY  IP CONSULT TO HOSPITALIST    Disposition: Home    Condition at Discharge: Stable     Discharge Instructions/Follow-up:   Follow up with your PCP within 5-7 days of discharge.  Have PCP obtain repeat CXR and renal function test.   Hold aspirin until it is okay by PCP restart taking it  Follow up with ENT as instructed   Take all your medications as prescribed.      Discharge Medications:     Current

## 2024-09-04 NOTE — PLAN OF CARE
Problem: Discharge Planning  Goal: Discharge to home or other facility with appropriate resources  Outcome: Progressing   DC to home, simple.

## 2024-09-04 NOTE — PROGRESS NOTES
Data- discharge order received, pt verbalized agreement to discharge, disposition to previous residence, no needs for HHC/DME.     Action- discharge instructions prepared and given to patient, pt verbalized understanding. Medication information packet given r/t NEW and/or CHANGED prescriptions emphasizing name/purpose/side effects, pt verbalized understanding. Discharge instruction summary: Diet- regular, Activity- as tolerated, Primary Care Physician as follows: No primary care provider on file. None f/u appointment, immunizations reviewed, prescription medications filled at outpatient pharmacy.      1. WEIGHT: Admit Weight - Scale: 69.4 kg (153 lb) (09/01/24 0950)        Today  Weight - Scale: 70.7 kg (155 lb 14.4 oz) (09/03/24 0745)       2. O2 SAT.: SpO2: 92 % (09/04/24 1700)    Response- Pt belongings gathered, IV removed. Disposition is home (no HHC/DME needs), transported with personal belongings, taken to lobby via w/c w/ RN , no complications.

## 2024-09-04 NOTE — PROGRESS NOTES
SUBJECTIVE:    Patient stated she has had no bleeding since the Merocel pack was inserted.          OBJECTIVE:    VITALS:  BP (!) 175/69   Pulse 67   Temp 98.4 °F (36.9 °C) (Oral)   Resp 16   Ht 1.6 m (5' 3\")   Wt 70.7 kg (155 lb 14.4 oz)   SpO2 92%   BMI 27.62 kg/m²   General appearance: Well developed, well nourished, no acute distress.     Nose:  Merocel pack in place with no evidence of active bleeding.  It was removed with no difficulty and with no bleeding.  Left nasal cavity sprayed with 0.05% oxymetazoline solution.  Oropharynx:  no evidence of post nasal bleeding before or after removal of the nasal pack.        IMPRESSION:   Posterior epistaxis, secondary to dry mucosa, probable nasal atherosclerosis, and aspirin anticoagulant therapy, controlled with AP Merocel pack, now removed.      RECOMMENDATIONS:    Home measures for nosebleed care to patient instructions.  RTO in two weeks.       MEDICAL DECISION MAKING    # and complexity of problems addressed:  88980 - Low  1 acute, uncomplicated illness or injury     Risk of Complications and /or Morbidity or Mortality of Patient Management  94590 - Low    OTC drugs

## 2024-09-04 NOTE — DISCHARGE INSTRUCTIONS
Follow up with your PCP within 5-7 days of discharge.  Have PCP obtain repeat CXR and renal function test.   Hold aspirin until it is okay by PCP restart taking it  Follow up with ENT as instructed   Take all your medications as prescribed.    ===================================================================================    Please read and follow these instructions.  Please call the office at [phone] and speak to the MA or LPN with any questions regarding these instructions.        CARE FOR NOSEBLEED      Do not pull crusts out of nose, refrain from \"nose-picking.\"    You may blow your nose gently, but do not move your nose around when blowing.      Over the next two weeks, do the following:     Use a 12 hour decongestant spray, such as 0.05% oxymetazoline nasal spray (eg. Afrin).  This is available \"over the counter\" at your pharmacy.  Rankin two sprays in each nostril three times a day for three days, then two times a day for 2 days, and then stop for two days, and then repeat the cycle once.         Use a saline (salt water) nasal spray/mist, (eg. Ocean nasal saline mist, AYR nasal spray).  This is available \"over the counter\" at your pharmacy.  Rankin two sprays in each nostril every two to three hours while you are awake, for the next two weeks.    Use a bedside humidifier, at night, while sleeping.     Use polysporin ointment in each nostril at bedtime.  This is available \"over the counter\" at your pharmacy.  “Mabie” the ointment onto the lateral wall of each nostril, gently, with a “Q-tip” applicator, then gently pinch and rub the nostrils as instructed.  (Do not apply directly to the septum middle wall of the nose)    If bleeding occurs, pinch your nostrils together in the soft part of the nose and push gently toward your face, and hold for ten minutes.  If bleeding persists, then repeat.  If bleeding still persists, dampen a cotton ball with Afrin and insert into your nostril and repeat the previous

## 2024-09-04 NOTE — PROGRESS NOTES
CLINICAL PHARMACY NOTE: MEDS TO BEDS    Total # of Prescriptions Filled: 2   The following medications were delivered to the patient:  PHENASEPTIC 1.4% LIQD  LEVOFLOXACIN 750MG TABS    Additional Documentation: Rafa MEYERS approved to deliver medications to patient room=signed  [facility]

## 2024-09-05 ENCOUNTER — CARE COORDINATION (OUTPATIENT)
Dept: CASE MANAGEMENT | Age: 79
End: 2024-09-05

## 2024-09-05 LAB
BACTERIA BLD CULT ORG #2: NORMAL
BACTERIA BLD CULT: NORMAL
LEGIONELLA AG UR QL: NORMAL
S PNEUM AG UR QL: NORMAL

## 2024-09-05 NOTE — TELEPHONE ENCOUNTER
Spoke to Raquel, she was able to get the nosebleed to stop after using afrin. She is not comfortable to wait 2 weeks for RTO, she is requesting to be seen sooner than that. Please advise what day you can see her.

## 2024-09-05 NOTE — CARE COORDINATION
Care Transitions Note    Initial Call - Call within 2 business days of discharge: Yes    Patient Current Location:  Home: [address] [place] [address]    Care Transition Nurse contacted the patient by telephone to perform post hospital discharge assessment, verified name and  as identifiers. Provided introduction to self, and explanation of the Care Transition Nurse role.     Patient: Raquel Dos Santos    Patient : 1945   MRN: [id number]    Reason for Admission: epistaxis  Discharge Date: 24  RURS: Readmission Risk Score: 8.6      Last Discharge Facility       Date Complaint Diagnosis Description Type Department Provider    24 Epistaxis Epistaxis ... ED to Hosp-Admission (Discharged) (ADMITTED) [facility] Rebecca Wing MD    24 Epistaxis Epistaxis ED (TRANSFER) [facility] Rory Gamble MD            Was this an external facility discharge? No    Additional needs identified to be addressed with provider   No needs identified             Method of communication with provider: none.    Patients top risk factors for readmission: medical condition-.    Interventions to address risk factors:   Education: .  Review of patient management of conditions/medications: .    Care Summary Note: Patient reports that she had a little bleeding this morning, used Afrin and it stopped.  She is sitting in a chair afraid to move about, fearful of bleeding again.  She has a call in to Dr. Gaxiola's office requesting an earlier appointment, they had her scheduled a month out.  She has all of her medications and is taking them as prescribed, including antibiotic for PNA. Discussed discharge instructions and reviewed medications.  She [facility] not have a [facility].  CTN will reach out one more time next week to follow up on bleeding.        Care Transition Nurse reviewed discharge instructions, medical action plan, and red flags with patient. The patient was given an opportunity to ask questions; all questions answered

## 2024-09-05 NOTE — TELEPHONE ENCOUNTER
Pt called back in stating when she woke up this morning her nose started to bleed again and she is concerned and would like to speak with someone.

## 2024-09-09 ENCOUNTER — CARE COORDINATION (OUTPATIENT)
Dept: CASE MANAGEMENT | Age: 79
End: 2024-09-09

## 2024-09-10 NOTE — PROGRESS NOTES
Physician Progress Note      PATIENT:               JOAN SZYMANSKI  CSN #:                  [id number]  :                       1945  ADMIT DATE:       2024 9:45 AM  DISCH DATE:        2024 7:46 PM  RESPONDING  PROVIDER #:        Rebecca Wing MD          QUERY TEXT:    Pt admitted with epistaxis.  Pt noted to have pneumonia. If possible, please   document in the progress notes and discharge summary if you are evaluating   and/or treating any of the following:    Note: CAP and HCAP indicate where the pneumonia was acquired, not a specific   type.    The medical record reflects the following:  Risk Factors: 78 y.o. female with PMHx of hypertension, hyperlipidemia,   asthma, CAD, collagenous colitis, IBS, COVID-19, and chronic bronchitis.  Clinical Indicators:  - IM H+P - Per patient report, nosebleed started 5 AM   this morning; went to [facility] where they were unable to control   nosebleed after trying several methods and transferred to [facility]   after discussing with ENT Dr.Dr Gaxiola.  Of note, pt was hospitalized for acute   hypoxic respiratory failure 2/2 multifocal pneumonia and treated with with   azithromycin, cefepime and Zyvox. RLL pneumonia; likely aspiration? Afebrile   with WBCs 11.9 K on admission.  CXR with right lower lobe pneumonia.  - IM   DC - RLL pneumonia; CXR with right lower lob  Treatment: IV antibiotics, ENT consult, imaging, respiratory viral panel  Options provided:  -- This patient has Gram negative pneumonia.  -- Other - I will add my own diagnosis  -- Disagree - Not applicable / Not valid  -- Disagree - Clinically unable to determine / Unknown  -- Refer to Clinical Documentation Reviewer    PROVIDER RESPONSE TEXT:    This patient has Gram negative pneumonia.    Query created by: Yves Acosta on 2024 9:15 AM      Electronically signed by:  Rebecca Wing MD 9/10/2024 7:19 AM

## 2024-09-18 ENCOUNTER — OFFICE VISIT (OUTPATIENT)
Dept: ENT CLINIC | Age: 79
End: 2024-09-18
Payer: MEDICARE

## 2024-09-18 VITALS
HEART RATE: 61 BPM | BODY MASS INDEX: 27.62 KG/M2 | DIASTOLIC BLOOD PRESSURE: 97 MMHG | SYSTOLIC BLOOD PRESSURE: 214 MMHG | OXYGEN SATURATION: 97 % | HEIGHT: 63 IN

## 2024-09-18 DIAGNOSIS — J01.90 ACUTE SINUSITIS, RECURRENCE NOT SPECIFIED, UNSPECIFIED LOCATION: ICD-10-CM

## 2024-09-18 DIAGNOSIS — R04.0 EPISTAXIS: Primary | ICD-10-CM

## 2024-09-18 PROCEDURE — 3077F SYST BP >= 140 MM HG: CPT | Performed by: OTOLARYNGOLOGY

## 2024-09-18 PROCEDURE — G8399 PT W/DXA RESULTS DOCUMENT: HCPCS | Performed by: OTOLARYNGOLOGY

## 2024-09-18 PROCEDURE — G8417 CALC BMI ABV UP PARAM F/U: HCPCS | Performed by: OTOLARYNGOLOGY

## 2024-09-18 PROCEDURE — 3080F DIAST BP >= 90 MM HG: CPT | Performed by: OTOLARYNGOLOGY

## 2024-09-18 PROCEDURE — 1090F PRES/ABSN URINE INCON ASSESS: CPT | Performed by: OTOLARYNGOLOGY

## 2024-09-18 PROCEDURE — 1123F ACP DISCUSS/DSCN MKR DOCD: CPT | Performed by: OTOLARYNGOLOGY

## 2024-09-18 PROCEDURE — G8427 DOCREV CUR MEDS BY ELIG CLIN: HCPCS | Performed by: OTOLARYNGOLOGY

## 2024-09-18 PROCEDURE — 1111F DSCHRG MED/CURRENT MED MERGE: CPT | Performed by: OTOLARYNGOLOGY

## 2024-09-18 PROCEDURE — 31231 NASAL ENDOSCOPY DX: CPT | Performed by: OTOLARYNGOLOGY

## 2024-09-18 PROCEDURE — 99214 OFFICE O/P EST MOD 30 MIN: CPT | Performed by: OTOLARYNGOLOGY

## 2024-09-18 PROCEDURE — 1036F TOBACCO NON-USER: CPT | Performed by: OTOLARYNGOLOGY

## 2024-09-18 RX ORDER — PREDNISONE 10 MG/1
TABLET ORAL
Qty: 30 TABLET | Refills: 0 | Status: SHIPPED | OUTPATIENT
Start: 2024-09-18

## 2024-09-18 RX ORDER — CEFUROXIME AXETIL 250 MG/1
250 TABLET ORAL 2 TIMES DAILY
Qty: 14 TABLET | Refills: 0 | Status: SHIPPED | OUTPATIENT
Start: 2024-09-18 | End: 2024-09-25

## 2024-10-02 ENCOUNTER — OFFICE VISIT (OUTPATIENT)
Dept: ENT CLINIC | Age: 79
End: 2024-10-02
Payer: MEDICARE

## 2024-10-02 ENCOUNTER — HOSPITAL ENCOUNTER (OUTPATIENT)
Dept: CT IMAGING | Age: 79
Discharge: HOME | End: 2024-10-02
Attending: OTOLARYNGOLOGY
Payer: MEDICARE

## 2024-10-02 VITALS
HEART RATE: 67 BPM | BODY MASS INDEX: 26.58 KG/M2 | WEIGHT: 150 LBS | SYSTOLIC BLOOD PRESSURE: 125 MMHG | OXYGEN SATURATION: 96 % | HEIGHT: 63 IN | DIASTOLIC BLOOD PRESSURE: 71 MMHG

## 2024-10-02 DIAGNOSIS — J32.0 CHRONIC MAXILLARY SINUSITIS: Primary | ICD-10-CM

## 2024-10-02 DIAGNOSIS — R04.0 EPISTAXIS: ICD-10-CM

## 2024-10-02 DIAGNOSIS — J01.90 ACUTE SINUSITIS, RECURRENCE NOT SPECIFIED, UNSPECIFIED LOCATION: ICD-10-CM

## 2024-10-02 PROCEDURE — 1123F ACP DISCUSS/DSCN MKR DOCD: CPT | Performed by: OTOLARYNGOLOGY

## 2024-10-02 PROCEDURE — G8417 CALC BMI ABV UP PARAM F/U: HCPCS | Performed by: OTOLARYNGOLOGY

## 2024-10-02 PROCEDURE — 3074F SYST BP LT 130 MM HG: CPT | Performed by: OTOLARYNGOLOGY

## 2024-10-02 PROCEDURE — 99213 OFFICE O/P EST LOW 20 MIN: CPT | Performed by: OTOLARYNGOLOGY

## 2024-10-02 PROCEDURE — 1090F PRES/ABSN URINE INCON ASSESS: CPT | Performed by: OTOLARYNGOLOGY

## 2024-10-02 PROCEDURE — 3078F DIAST BP <80 MM HG: CPT | Performed by: OTOLARYNGOLOGY

## 2024-10-02 PROCEDURE — 1036F TOBACCO NON-USER: CPT | Performed by: OTOLARYNGOLOGY

## 2024-10-02 PROCEDURE — G8427 DOCREV CUR MEDS BY ELIG CLIN: HCPCS | Performed by: OTOLARYNGOLOGY

## 2024-10-02 PROCEDURE — G8399 PT W/DXA RESULTS DOCUMENT: HCPCS | Performed by: OTOLARYNGOLOGY

## 2024-10-02 PROCEDURE — 1111F DSCHRG MED/CURRENT MED MERGE: CPT | Performed by: OTOLARYNGOLOGY

## 2024-10-02 PROCEDURE — G8484 FLU IMMUNIZE NO ADMIN: HCPCS | Performed by: OTOLARYNGOLOGY

## 2024-10-02 PROCEDURE — 70486 CT MAXILLOFACIAL W/O DYE: CPT

## 2024-10-02 NOTE — PROGRESS NOTES
[facility]  DIVISION OF OTOLARYNGOLOGY- HEAD & NECK SURGERY  Follow up      Patient Name: Raquel Dos Santos  Medical Record Number:  [id number]  Primary Care Physician:  Clint Mensah DO  Date of Consultation: 10/2/2024    Chief Complaint: Nosebleeds        Interval History  Patient is following up for her nosebleeds.  I saw her in September 18.  She had had a Rhino Rocket placed in the left side.  When I saw her she had ongoing sinusitis.  I give her more antibiotics and get a posttreatment CT scan.  She says she feels good today.  She has not noticed any significant purulent drainage or other symptoms consistent with sinusitis.            REVIEW OF SYSTEMS  As above    PHYSICAL EXAM  GENERAL: No Acute Distress, Alert and Oriented, no Hoarseness, strong voice  EYES: EOMI, Anti-icteric  HENT:   Head: Normocephalic and atraumatic.   Face:  Symmetric, facial nerve intact, no sinus tenderness  Right Ear: Normal external ear, normal external auditory canal, intact tympanic membrane with normal mobility and aerated middle ear  Left Ear: Normal external ear, normal external auditory canal, intact tympanic membrane with normal mobility and aerated middle ear  Mouth/Oral Cavity:  normal lips, Uvula is midline, no mucosal lesions, no trismus  Oropharynx/Larynx:  normal oropharynx  Nose:Normal external nasal appearance.  Anterior rhinoscopy shows no obvious purulent drainage  NECK: Normal range of motion, no thyromegaly, trachea is midline, no lymphadenopathy, no neck masses, no crepitus  CHEST: Normal respiratory effort, no retractions, breathing comfortably          RADIOLOGY  Summary of findings:  I reviewed her CT scan.  She does still have opacification of the left maxillary sinus with some air-fluid levels.    \        ASSESSMENT/PLAN  1. Chronic maxillary sinusitis  She does still have evidence of sinusitis in the left maxillary sinus.  This could just be blood from the nosebleed.  I do not think that the sinusitis

## 2024-11-20 ENCOUNTER — OFFICE VISIT (OUTPATIENT)
Dept: ENT CLINIC | Age: 79
End: 2024-11-20
Payer: MEDICARE

## 2024-11-20 VITALS
HEIGHT: 63 IN | OXYGEN SATURATION: 95 % | HEART RATE: 65 BPM | SYSTOLIC BLOOD PRESSURE: 147 MMHG | BODY MASS INDEX: 28.53 KG/M2 | WEIGHT: 161 LBS | DIASTOLIC BLOOD PRESSURE: 64 MMHG

## 2024-11-20 DIAGNOSIS — R04.0 EPISTAXIS: ICD-10-CM

## 2024-11-20 DIAGNOSIS — J32.0 CHRONIC MAXILLARY SINUSITIS: Primary | ICD-10-CM

## 2024-11-20 PROCEDURE — G8399 PT W/DXA RESULTS DOCUMENT: HCPCS | Performed by: OTOLARYNGOLOGY

## 2024-11-20 PROCEDURE — G8484 FLU IMMUNIZE NO ADMIN: HCPCS | Performed by: OTOLARYNGOLOGY

## 2024-11-20 PROCEDURE — 31231 NASAL ENDOSCOPY DX: CPT | Performed by: OTOLARYNGOLOGY

## 2024-11-20 PROCEDURE — 1159F MED LIST DOCD IN RCRD: CPT | Performed by: OTOLARYNGOLOGY

## 2024-11-20 PROCEDURE — 99213 OFFICE O/P EST LOW 20 MIN: CPT | Performed by: OTOLARYNGOLOGY

## 2024-11-20 PROCEDURE — 1036F TOBACCO NON-USER: CPT | Performed by: OTOLARYNGOLOGY

## 2024-11-20 PROCEDURE — 3077F SYST BP >= 140 MM HG: CPT | Performed by: OTOLARYNGOLOGY

## 2024-11-20 PROCEDURE — 3078F DIAST BP <80 MM HG: CPT | Performed by: OTOLARYNGOLOGY

## 2024-11-20 PROCEDURE — 1090F PRES/ABSN URINE INCON ASSESS: CPT | Performed by: OTOLARYNGOLOGY

## 2024-11-20 PROCEDURE — G8417 CALC BMI ABV UP PARAM F/U: HCPCS | Performed by: OTOLARYNGOLOGY

## 2024-11-20 PROCEDURE — G8427 DOCREV CUR MEDS BY ELIG CLIN: HCPCS | Performed by: OTOLARYNGOLOGY

## 2024-11-20 PROCEDURE — 1123F ACP DISCUSS/DSCN MKR DOCD: CPT | Performed by: OTOLARYNGOLOGY

## 2025-01-02 RX ORDER — TORSEMIDE 5 MG/1
5 TABLET ORAL DAILY
Qty: 90 TABLET | Refills: 3 | OUTPATIENT
Start: 2025-01-02

## (undated) DEVICE — NEEDLE HYPO 18GA L1.5IN THN WALL PIVOTING SHLD BVL ORIENTED

## (undated) DEVICE — CANISTER, RIGID, 1200CC: Brand: MEDLINE INDUSTRIES, INC.

## (undated) DEVICE — MINOR SET UP: Brand: MEDLINE INDUSTRIES, INC.

## (undated) DEVICE — Z DISCONTINUED BY MEDLINE USE 2711682 TRAY SKIN PREP DRY W/ PREM GLV

## (undated) DEVICE — TUBING, SUCTION, 1/4" X 12', STRAIGHT: Brand: MEDLINE

## (undated) DEVICE — NEEDLE HYPO 25GA L1.5IN BVL ORIENTED ECLIPSE

## (undated) DEVICE — SINGLE USE BIOPSY VALVE MAJ-210: Brand: SINGLE USE BIOPSY VALVE (STERILE)

## (undated) DEVICE — 1010 S-DRAPE TOWEL DRAPE 10/BX: Brand: STERI-DRAPE™

## (undated) DEVICE — AIRLIFE™ MISTY MAX 10™ NEBULIZER WITH 7 FOOT (2.1 M) CRUSH RESISTANT OXYGEN TUBING, BAFFLED TEE ADAPTER (22 MM I.D./22 MM O.D.), MOUTHPIECE AND 6 INCH (15 CM) FLEXTUBE: Brand: AIRLIFE™

## (undated) DEVICE — CONTAINER SPEC 480ML CLR POLYSTYR 10% NEUT BUFF FRMLN ZN

## (undated) DEVICE — Z INACTIVE USE 2711638 MASK SURG WHT STD PREM NONOIL HLTH CARE PARTICULATE RESP

## (undated) DEVICE — GLOVE SURG SZ 75 CRM LTX FREE POLYISOPRENE POLYMER BEAD ANTI

## (undated) DEVICE — SYRINGE MED 10ML POLYPR LUERSLIP TIP FLAT TOP W/O SFTY DISP

## (undated) DEVICE — MAJ-1414 SINGLE USE ADPATER BIOPSY VALV: Brand: SINGLE USE ADAPTOR BIOPSY VALVE

## (undated) DEVICE — FORCEPS BX 240CM 2.4MM L NDL RAD JAW 4 M00513334

## (undated) DEVICE — INTENDED FOR TISSUE SEPARATION, AND OTHER PROCEDURES THAT REQUIRE A SHARP SURGICAL BLADE TO PUNCTURE OR CUT.: Brand: BARD-PARKER ® STAINLESS STEEL BLADES

## (undated) DEVICE — Z DISCONTINUED USE 2272117 DRAPE SURG 3 QTR N INVASIVE 2 LAYR DISP

## (undated) DEVICE — SOLUTION IV IRRIG POUR BRL 0.9% SODIUM CHL 2F7124

## (undated) DEVICE — GOWN SIRUS NONREIN LG W/TWL: Brand: MEDLINE INDUSTRIES, INC.

## (undated) DEVICE — SINGLE USE SUCTION VALVE MAJ-209: Brand: SINGLE USE SUCTION VALVE (STERILE)

## (undated) DEVICE — MASK, AEROSOL, ELONGATED, ADULT: Brand: MEDLINE

## (undated) DEVICE — 60 ML SYRINGE REGULAR TIP: Brand: MONOJECT

## (undated) DEVICE — ENDOSCOPY KIT: Brand: MEDLINE INDUSTRIES, INC.